# Patient Record
Sex: MALE | Race: WHITE | Employment: OTHER | URBAN - METROPOLITAN AREA
[De-identification: names, ages, dates, MRNs, and addresses within clinical notes are randomized per-mention and may not be internally consistent; named-entity substitution may affect disease eponyms.]

---

## 2018-12-20 ENCOUNTER — HOSPITAL ENCOUNTER (OUTPATIENT)
Dept: PSYCHIATRY | Age: 68
Discharge: HOME | End: 2018-12-20
Payer: MEDICARE

## 2018-12-20 PROCEDURE — 99406 BEHAV CHNG SMOKING 3-10 MIN: CPT

## 2018-12-20 PROCEDURE — 94250 HC DIFFUSION: CPT

## 2019-01-17 ENCOUNTER — HOSPITAL ENCOUNTER (OUTPATIENT)
Dept: PSYCHIATRY | Age: 69
Discharge: HOME | End: 2019-01-17
Payer: MEDICARE

## 2019-01-17 PROCEDURE — 99407 BEHAV CHNG SMOKING > 10 MIN: CPT

## 2019-01-17 PROCEDURE — 94250 HC DIFFUSION: CPT

## 2019-01-31 ENCOUNTER — HOSPITAL ENCOUNTER (OUTPATIENT)
Dept: PSYCHIATRY | Age: 69
Discharge: HOME | End: 2019-01-31
Payer: MEDICARE

## 2019-01-31 PROCEDURE — 99407 BEHAV CHNG SMOKING > 10 MIN: CPT

## 2019-01-31 PROCEDURE — 94250 HC DIFFUSION: CPT

## 2019-02-07 ENCOUNTER — HOSPITAL ENCOUNTER (OUTPATIENT)
Dept: PSYCHIATRY | Age: 69
Discharge: HOME | End: 2019-02-07
Payer: MEDICARE

## 2019-02-07 PROCEDURE — 94250 HC DIFFUSION: CPT

## 2019-02-07 PROCEDURE — 99407 BEHAV CHNG SMOKING > 10 MIN: CPT

## 2019-12-02 ENCOUNTER — HOSPITAL ENCOUNTER (OUTPATIENT)
Dept: PSYCHIATRY | Age: 69
Discharge: HOME | End: 2019-12-02
Payer: MEDICARE

## 2019-12-09 ENCOUNTER — HOSPITAL ENCOUNTER (OUTPATIENT)
Dept: PSYCHIATRY | Age: 69
Discharge: HOME | End: 2019-12-09
Payer: MEDICARE

## 2019-12-09 PROCEDURE — 94250 HC DIFFUSION: CPT | Performed by: COUNSELOR

## 2019-12-16 ENCOUNTER — HOSPITAL ENCOUNTER (OUTPATIENT)
Dept: PSYCHIATRY | Age: 69
Discharge: HOME | End: 2019-12-16
Payer: MEDICARE

## 2019-12-16 PROCEDURE — 94250 HC DIFFUSION: CPT | Performed by: COUNSELOR

## 2019-12-23 ENCOUNTER — HOSPITAL ENCOUNTER (OUTPATIENT)
Dept: PSYCHIATRY | Age: 69
Discharge: HOME | End: 2019-12-23
Payer: MEDICARE

## 2019-12-23 PROCEDURE — 94250 HC DIFFUSION: CPT | Performed by: COUNSELOR

## 2019-12-30 ENCOUNTER — HOSPITAL ENCOUNTER (OUTPATIENT)
Dept: PSYCHIATRY | Age: 69
Discharge: HOME | End: 2019-12-30
Payer: MEDICARE

## 2019-12-30 PROCEDURE — 94250 HC DIFFUSION: CPT | Performed by: COUNSELOR

## 2019-12-30 NOTE — PROGRESS NOTES
[place]   Progress Note    Client Name: Marlon Fallon         Treatment Site:   [x][facility]      [] [address]                      CO level:  1ppm    Length of Service: 60 minutes   Session Type:    [] individual   [x]Group Client/Staff Ratio: 4/1                              Clients target quit date: 19       Last date of tobacco use: 19  Pharmacotherapy provided this session:   []  NRT: Patch  []21mg . / []14mg.  / []7mg. []  NRT:  Gum []2mg. / []4mg.   x (  )boxes  []  NRT: Lozenge []2mg.  / []4mg.  x (  ) tubes       []  Varenicline []0.5 mg.  [] 1 mg. Wks. (  )   []  Bupropion    Wks.  (  )  [x]  Other: none this week________                                                                        Treatment Objectives addressed during the session:       [x] Coping Skils [x] Secondhand Smoke Risks   [] Relapse Prevention [] Promote Self Efficacy   [] Addiction [] Enhance Self-Image   [] Stress Management [] Use Self Affirmation   [] Health and Wellness [] Problem Solving Techniques   []  [] Conflict Resolution/Anger Management      Clients Response to counseling:  [x] Active participant                        [] Passive listener        [] No behavior change, still participating                   [] Inappropriate:   [] Implemented other strategy/behavior changes:   [] Discussed Quit Plan that will be implemented  [] Re-set Quit Date:     Clients Response to Pharmacotherapy:  [] Decreased cravings  [] Decrease in tobacco use:   [x] Maintaining abstinence  [] No change experienced by client  []        Risk/Benefit Meds education provided to client  []        Adverse reaction (if checked - explain):       Plan of Action:  [x] Maintain abstinence  [] Continue treatment; no changes    [] Change pharmacotherapy:   [] Attend Nicotine Anonymous meeting   [x]         Assignments/Homework:   []         Triggers /

## 2020-01-06 ENCOUNTER — HOSPITAL ENCOUNTER (OUTPATIENT)
Dept: PSYCHIATRY | Age: 70
Discharge: HOME | End: 2020-01-06
Payer: MEDICARE

## 2020-01-06 PROCEDURE — 94250 HC DIFFUSION: CPT | Performed by: COUNSELOR

## 2020-01-06 NOTE — PROGRESS NOTES
relapse from crash course. []         Triggers / Concerns to be addressed:   [x] Graduated / received aftercare plan    Comments:     []No call/ no show by client.   Phone call to client:   [] Cancelled:     Counselor Signature: RADHA Morris Session Date:  01/06/2020         Time:2:31pm      CO Level:  1

## 2021-01-18 ENCOUNTER — HOSPITAL ENCOUNTER (OUTPATIENT)
Dept: PSYCHIATRY | Age: 71
Discharge: HOME | End: 2021-01-18
Payer: MEDICARE

## 2021-01-18 NOTE — FLOWSHEET NOTE
Assessment is complete. Client provided verbal consent to participate in the program via telehealth.   Electronically signed by Henry Covington on 1/18/2021 at 11:19 AM

## 2021-02-01 ENCOUNTER — HOSPITAL ENCOUNTER (OUTPATIENT)
Dept: PSYCHIATRY | Age: 71
Discharge: HOME | End: 2021-02-01
Payer: MEDICARE

## 2021-02-01 PROCEDURE — 99411 PREVENTIVE COUNSELING GROUP: CPT | Performed by: COUNSELOR

## 2021-02-01 NOTE — PROGRESS NOTES
[address] [facility]   Progress Note      Client Name: Lloyd Tolliver    Treatment Site:   [x][facility]      [] [address] [place] [address]                      CO Level:  ppm    Length of Service: 30 minutes       Session Type:  [] In Person [x] Virtually - Provider Location [x][facility]      [] [address] [place] [address]                    Patient Location    [x]Patient's Home    [] Other:       Session Provided: [x] Individual   []Group             Client/Staff Ratio:                                 Clients target quit date: 2/10/21       Last date of tobacco use: 2020    Client's actual quit date:       [x]  Client still smoking     Pharmacotherapy provided this session:  []  NRT: Patch  []21mg . / []14mg.  / []7mg. [x]  NRT:  Gum [x]2mg. / []4mg.   x ( 1 )boxes  []  NRT: Lozenge []2mg.  / []4mg.  x (  ) tubes      [x]  Varenicline [x]0.5 mg.  [] 1 mg. Wks. ( 1,2 )  []  Bupropion    Wks.  (  )  []  Other                                                                Treatment Objectives addressed during the session:       [] Coping Skils [] Secondhand Smoke Risks   [x] Relapse Prevention [x] Promote Self Efficacy   [] Addiction [] Enhance Self-Image   [] Stress Management [x] Use Self Affirmation   [] Health and Wellness [] Problem Solving Techniques   []  [] Conflict Resolution/Anger Management      Clients Response to counseling:  [x] Active participant                        [] Passive listener        [] No behavior change, still participating                   [] Inappropriate:   [] Implemented other strategy/behavior changes:   [x] Discussed Quit Plan that will be implemented  [] Re-set Quit Date:     Clients Response to Pharmacotherapy:  [] Decreased cravings  [] Decrease in tobacco use:   [] Maintaining abstinence  [] No change experienced by client  [x]        Risk/Benefit Meds education provided to client  []        Adverse reaction:   []       Other:     Plan of Action:  [] Maintain abstinence  [x] Continue treatment;     [] Change pharmacotherapy:   [] Attend Nicotine Anonymous meeting   [x]        Assignments/Homework: complete relapse section of crash course. []        Triggers / Concerns to be addressed:   [] Graduated / received aftercare plan    Comments: medication authorization faxed to pharmacy on ubaldo. SHILA: Signature, Date, Time: Electronically signed by Princess Pimentel on 2/1/2021 at 5:22 PM          CO Level:  No reading taken, phone session.

## 2021-02-08 ENCOUNTER — HOSPITAL ENCOUNTER (OUTPATIENT)
Dept: PSYCHIATRY | Age: 71
Discharge: HOME | End: 2021-02-08
Payer: MEDICARE

## 2021-02-08 PROCEDURE — 99411 PREVENTIVE COUNSELING GROUP: CPT | Performed by: COUNSELOR

## 2021-02-08 NOTE — PROGRESS NOTES
[facility]      Client Name: Chris Encinas    Treatment Site:   [x][facility]      [] [address] [place] [address]                      CO Level:  ppm    Length of Service: 30 minutes       Session Type:  [] In Person [x] Virtually - Provider Location [x][facility]      [] [address] [place] [address]                    Patient Location    [x]Patient's Home    [] Other:       Session Provided: [x] Individual   []Group             Client/Staff Ratio:                                 Clients target quit date: 21       Last date of tobacco use: 21    Client's actual quit date:       [x]  Client still smoking     Pharmacotherapy provided this session:  []  NRT: Patch  []21mg . / []14mg.  / []7mg. []  NRT:  Gum []2mg. / []4mg.   x (  )boxes  []  NRT: Lozenge []2mg.  / []4mg.  x (  ) tubes      []  Varenicline []0.5 mg.  [] 1 mg. Wks. (  )  []  Bupropion    Wks. (  )  [x]  Other: none this  Week. Treatment Objectives addressed during the session:       [] Coping Skils [] Secondhand Smoke Risks   [] Relapse Prevention [x] Promote Self Efficacy   [x] Addiction [] Enhance Self-Image   [] Stress Management [x] Use Self Affirmation   [] Health and Wellness [] Problem Solving Techniques   []  [] Conflict Resolution/Anger Management      Clients Response to counseling:  [x] Active participant                        [] Passive listener        [] No behavior change, still participating                   [] Inappropriate:   [] Implemented other strategy/behavior changes:   [] Discussed Quit Plan that will be implemented  [] Re-set Quit Date:     Clients Response to Pharmacotherapy:  [] Decreased cravings  [x] Decrease in tobacco use: down to five cigarettes from a pack a day.   [] Maintaining abstinence  [] No change experienced by client  []        Risk/Benefit Meds education provided to client  [x]        Adverse reaction: none  []       Other:     Plan of Action:  [x] Maintain abstinence  [x] Continue treatment;     [] Change pharmacotherapy:   [] Attend Nicotine Anonymous meeting   [x]        Assignments/Homework: complete addiction section of the crash course. []        Triggers / Concerns to be addressed:   [] Graduated / received aftercare plan    Comments: client reports that he is tolerating the medication well. SHILA: Signature, Date, Time: Electronically signed by Alan Musa on 2/8/2021 at 5:26 PM          CO Level:  No reading taken phone session.

## 2021-02-15 ENCOUNTER — HOSPITAL ENCOUNTER (OUTPATIENT)
Dept: PSYCHIATRY | Age: 71
Discharge: HOME | End: 2021-02-15
Payer: MEDICARE

## 2021-02-15 PROCEDURE — 99411 PREVENTIVE COUNSELING GROUP: CPT | Performed by: COUNSELOR

## 2021-02-15 NOTE — PROGRESS NOTES
[address] [facility]   Progress Note      Client Name: Antonia La    Treatment Site:   [x][facility]      [] [address] [place] [address]                      CO Level:  ppm    Length of Service: 30 minutes       Session Type:  [] In Person [x] Virtually - Provider Location [][facility]      [x] [address] [place] [address]                    Patient Location    []Patient's Home    [x] Other: client phone. Session Provided: [x] Individual   []Group             Client/Staff Ratio:                                 Clients target quit date: 2021       Last date of tobacco use: 2021    Client's actual quit date: 2021      []  Client still smoking     Pharmacotherapy provided this session:  []  NRT: Patch  []21mg . / []14mg.  / []7mg. []  NRT:  Gum []2mg. / []4mg.   x (  )boxes  []  NRT: Lozenge []2mg.  / []4mg.  x (  ) tubes      [x]  Varenicline []0.5 mg.  [x] 1 mg. Wks. ( 2,3 )  []  Bupropion    Wks.  (  )  []  Other                                                                Treatment Objectives addressed during the session:       [] Coping Skils [] Secondhand Smoke Risks   [] Relapse Prevention [x] Promote Self Efficacy   [] Addiction [] Enhance Self-Image   [x] Stress Management [x] Use Self Affirmation   [] Health and Wellness [] Problem Solving Techniques   []  [] Conflict Resolution/Anger Management      Clients Response to counseling:  [x] Active participant                        [] Passive listener        [] No behavior change, still participating                   [] Inappropriate:   [] Implemented other strategy/behavior changes:   [] Discussed Quit Plan that will be implemented  [] Re-set Quit Date:     Clients Response to Pharmacotherapy:  [] Decreased cravings  [] Decrease in tobacco use:   [x] Maintaining abstinence  [] No change experienced by client  []        Risk/Benefit Meds education provided to client  []        Adverse reaction:   []       Other:     Plan of

## 2021-02-22 PROCEDURE — 99411 PREVENTIVE COUNSELING GROUP: CPT | Performed by: COUNSELOR

## 2021-02-27 ENCOUNTER — IMMUNIZATION (OUTPATIENT)
Dept: PRIMARY CARE CLINIC | Age: 71
End: 2021-02-27
Payer: MEDICARE

## 2021-02-27 PROCEDURE — 91300 COVID-19, PFIZER VACCINE 30MCG/0.3ML DOSE: CPT | Performed by: EMERGENCY MEDICINE

## 2021-02-27 PROCEDURE — 0001A COVID-19, PFIZER VACCINE 30MCG/0.3ML DOSE: CPT | Performed by: EMERGENCY MEDICINE

## 2021-03-01 ENCOUNTER — HOSPITAL ENCOUNTER (OUTPATIENT)
Dept: PSYCHIATRY | Age: 71
Discharge: HOME | End: 2021-03-01
Payer: MEDICARE

## 2021-03-03 PROCEDURE — 99411 PREVENTIVE COUNSELING GROUP: CPT | Performed by: COUNSELOR

## 2021-03-03 NOTE — PROGRESS NOTES
176 [facility]   Progress Note      Client Name: Caio Tolliver    Treatment Site:   [x][facility]      [] [facility]                      CO Level:  ppm    Length of Service: 30 minutes       Session Type:  [] In Person [x] Virtually - Provider Location [][facility]      [x] [facility]                    Patient Location    [x]Patient's Home    [] Other:       Session Provided: [x] Individual   []Group             Client/Staff Ratio:                                 Clients target quit date: 21       Last date of tobacco use: 21    Client's actual quit date: 21      []  Client still smoking     Pharmacotherapy provided this session:  []  NRT: Patch  []21mg . / []14mg.  / []7mg. []  NRT:  Gum []2mg. / []4mg.   x (  )boxes  []  NRT: Lozenge []2mg.  / []4mg.  x (  ) tubes      []  Varenicline []0.5 mg.  [] 1 mg. Wks. (  )  []  Bupropion    Wks. (  )  [x]  Other:none this week.                                                               Treatment Objectives addressed during the session:       [x] Coping Skils [] Secondhand Smoke Risks   [] Relapse Prevention [] Promote Self Efficacy   [] Addiction [] Enhance Self-Image   [] Stress Management [x] Use Self Affirmation   [] Health and Wellness [] Problem Solving Techniques   []  [] Conflict Resolution/Anger Management      Clients Response to counseling:  [x] Active participant                        [] Passive listener        [] No behavior change, still participating                   [] Inappropriate:   [] Implemented other strategy/behavior changes:   [] Discussed Quit Plan that will be implemented  [] Re-set Quit Date:     Clients Response to Pharmacotherapy:  [] Decreased cravings  [] Decrease in tobacco use:   [x] Maintaining abstinence  [] No change experienced by client  []        Risk/Benefit Meds education provided to client  []        Adverse reaction:   []       Other:     Plan of Action:  [x] Maintain abstinence  [] Continue treatment;     [] Change pharmacotherapy:   [] Attend Nicotine Anonymous meeting   []        Assignments/Homework:   []        Triggers / Concerns to be addressed:   [] Graduated / received aftercare plan    Comments: client called in next day 2/23 for make up session. This note is for session 2/22/2021. SHILA: Signature, Date, Time: Electronically signed by Manny Mitchell on 3/3/2021 at 10:47 AM          CO Level:  No reading taken; phone session.

## 2021-03-03 NOTE — PROGRESS NOTES
[address] [facility]   Progress Note      Client Name: Alex Vicente    Treatment Site:   [x][facility]      [] [address] [place] [address]                      CO Level:  ppm    Length of Service: 30 minutes       Session Type:  [] In Person [x] Virtually - Provider Location [][facility]      [x] [address] [place] [address]                    Patient Location    [x]Patient's Home    [] Other:       Session Provided: [x] Individual   []Group             Client/Staff Ratio:                                 Clients target quit date: 2021       Last date of tobacco use: 21    Client's actual quit date: 21      []  Client still smoking     Pharmacotherapy provided this session:  []  NRT: Patch  []21mg . / []14mg.  / []7mg. []  NRT:  Gum []2mg. / []4mg.   x (  )boxes  []  NRT: Lozenge []2mg.  / []4mg.  x (  ) tubes      [x]  Varenicline []0.5 mg.  [x] 1 mg. Wks. (5,6 )  []  Bupropion    Wks.  (  )  []  Other                                                                Treatment Objectives addressed during the session:       [] Coping Skils [] Secondhand Smoke Risks   [] Relapse Prevention [] Promote Self Efficacy   [] Addiction [] Enhance Self-Image   [] Stress Management [x] Use Self Affirmation   [x] Health and Wellness [] Problem Solving Techniques   []  [] Conflict Resolution/Anger Management      Clients Response to counseling:  [x] Active participant                        [] Passive listener        [] No behavior change, still participating                   [] Inappropriate:   [] Implemented other strategy/behavior changes:   [] Discussed Quit Plan that will be implemented  [] Re-set Quit Date:     Clients Response to Pharmacotherapy:  [] Decreased cravings  [] Decrease in tobacco use:   [x] Maintaining abstinence  [] No change experienced by client  []        Risk/Benefit Meds education provided to client  []        Adverse reaction:   []       Other:     Plan of Action:  [x] Maintain abstinence  [] Continue treatment;     [] Change pharmacotherapy:   [] Attend Nicotine Anonymous meeting   []        Assignments/Homework:   []        Triggers / Concerns to be addressed:   [x] Graduated / received aftercare plan    Comments: client reported phone issues as the reasons he missed phone calls for group. Client has made up all missed groups and has graduated successfully today. SHILA: Signature, Date, Time: Electronically signed by Brooklyn Chambers on 3/3/2021 at 11:01 AM            CO Level:  No reading taken; phone session. Pandemic year.

## 2021-03-23 ENCOUNTER — IMMUNIZATION (OUTPATIENT)
Dept: PRIMARY CARE CLINIC | Age: 71
End: 2021-03-23
Payer: MEDICARE

## 2021-03-23 PROCEDURE — 0002A COVID-19, PFIZER VACCINE 30MCG/0.3ML DOSE: CPT | Performed by: PHYSICIAN ASSISTANT

## 2021-03-23 PROCEDURE — 91300 COVID-19, PFIZER VACCINE 30MCG/0.3ML DOSE: CPT | Performed by: PHYSICIAN ASSISTANT

## 2021-04-25 ENCOUNTER — HOSPITAL ENCOUNTER (INPATIENT)
Age: 71
LOS: 4 days | Discharge: HOME | DRG: 885 | End: 2021-04-29
Attending: EMERGENCY MEDICINE | Admitting: PSYCHIATRY & NEUROLOGY
Payer: MEDICARE

## 2021-04-25 DIAGNOSIS — F39 MOOD DISORDER (HCC): Primary | ICD-10-CM

## 2021-04-25 DIAGNOSIS — F19.10 SUBSTANCE ABUSE (HCC): ICD-10-CM

## 2021-04-25 DIAGNOSIS — F10.920 ACUTE ALCOHOLIC INTOXICATION WITHOUT COMPLICATION (HCC): ICD-10-CM

## 2021-04-25 PROBLEM — R45.851 DEPRESSION WITH SUICIDAL IDEATION: Status: ACTIVE | Noted: 2021-04-25

## 2021-04-25 PROBLEM — F32.A DEPRESSION WITH SUICIDAL IDEATION: Status: ACTIVE | Noted: 2021-04-25

## 2021-04-25 LAB
ACETAMINOPHEN LEVEL: <5 MCG/ML (ref 10–30)
ALBUMIN SERPL-MCNC: 3.7 G/DL (ref 3.5–5.2)
ALP BLD-CCNC: 80 U/L (ref 40–129)
ALT SERPL-CCNC: 12 U/L (ref 0–40)
AMPHETAMINE SCREEN, URINE: NOT DETECTED
ANION GAP SERPL CALCULATED.3IONS-SCNC: 14 MMOL/L (ref 7–16)
ANISOCYTOSIS: ABNORMAL
AST SERPL-CCNC: 48 U/L (ref 0–39)
BARBITURATE SCREEN URINE: NOT DETECTED
BASOPHILS ABSOLUTE: 0 E9/L (ref 0–0.2)
BASOPHILS RELATIVE PERCENT: 1.1 % (ref 0–2)
BENZODIAZEPINE SCREEN, URINE: POSITIVE
BILIRUB SERPL-MCNC: <0.2 MG/DL (ref 0–1.2)
BILIRUBIN URINE: NEGATIVE
BLOOD, URINE: NEGATIVE
BUN BLDV-MCNC: 15 MG/DL (ref 6–23)
CALCIUM SERPL-MCNC: 8.5 MG/DL (ref 8.6–10.2)
CANNABINOID SCREEN URINE: POSITIVE
CHLORIDE BLD-SCNC: 104 MMOL/L (ref 98–107)
CLARITY: CLEAR
CO2: 24 MMOL/L (ref 22–29)
COCAINE METABOLITE SCREEN URINE: POSITIVE
COLOR: YELLOW
CREAT SERPL-MCNC: 1.7 MG/DL (ref 0.7–1.2)
EKG ATRIAL RATE: 75 BPM
EKG P AXIS: 76 DEGREES
EKG P-R INTERVAL: 142 MS
EKG Q-T INTERVAL: 412 MS
EKG QRS DURATION: 96 MS
EKG QTC CALCULATION (BAZETT): 460 MS
EKG R AXIS: 81 DEGREES
EKG T AXIS: 58 DEGREES
EKG VENTRICULAR RATE: 75 BPM
EOSINOPHILS ABSOLUTE: 0.38 E9/L (ref 0.05–0.5)
EOSINOPHILS RELATIVE PERCENT: 6.1 % (ref 0–6)
ETHANOL: 176 MG/DL (ref 0–0.08)
ETHANOL: <10 MG/DL (ref 0–0.08)
FENTANYL SCREEN, URINE: NOT DETECTED
GFR AFRICAN AMERICAN: 48
GFR NON-AFRICAN AMERICAN: 40 ML/MIN/1.73
GLUCOSE BLD-MCNC: 78 MG/DL (ref 74–99)
GLUCOSE URINE: NEGATIVE MG/DL
HCT VFR BLD CALC: 35.8 % (ref 37–54)
HEMOGLOBIN: 12.6 G/DL (ref 12.5–16.5)
KETONES, URINE: NEGATIVE MG/DL
LEUKOCYTE ESTERASE, URINE: NEGATIVE
LYMPHOCYTES ABSOLUTE: 2.05 E9/L (ref 1.5–4)
LYMPHOCYTES RELATIVE PERCENT: 33 % (ref 20–42)
Lab: ABNORMAL
MCH RBC QN AUTO: 40.4 PG (ref 26–35)
MCHC RBC AUTO-ENTMCNC: 35.2 % (ref 32–34.5)
MCV RBC AUTO: 114.7 FL (ref 80–99.9)
METHADONE SCREEN, URINE: NOT DETECTED
MONOCYTES ABSOLUTE: 0.25 E9/L (ref 0.1–0.95)
MONOCYTES RELATIVE PERCENT: 3.5 % (ref 2–12)
MYELOCYTE PERCENT: 0.9 % (ref 0–0)
NEUTROPHILS ABSOLUTE: 3.53 E9/L (ref 1.8–7.3)
NEUTROPHILS RELATIVE PERCENT: 56.5 % (ref 43–80)
NITRITE, URINE: NEGATIVE
OPIATE SCREEN URINE: NOT DETECTED
OXYCODONE URINE: NOT DETECTED
PDW BLD-RTO: 14.6 FL (ref 11.5–15)
PH UA: 5.5 (ref 5–9)
PHENCYCLIDINE SCREEN URINE: NOT DETECTED
PLATELET # BLD: 243 E9/L (ref 130–450)
PMV BLD AUTO: 9.2 FL (ref 7–12)
POIKILOCYTES: ABNORMAL
POLYCHROMASIA: ABNORMAL
POTASSIUM SERPL-SCNC: 4 MMOL/L (ref 3.5–5)
PROTEIN UA: NEGATIVE MG/DL
RBC # BLD: 3.12 E12/L (ref 3.8–5.8)
SALICYLATE, SERUM: <0.3 MG/DL (ref 0–30)
SARS-COV-2, NAAT: NOT DETECTED
SODIUM BLD-SCNC: 142 MMOL/L (ref 132–146)
SPECIFIC GRAVITY UA: 1.02 (ref 1–1.03)
TARGET CELLS: ABNORMAL
TEAR DROP CELLS: ABNORMAL
TOTAL PROTEIN: 5.9 G/DL (ref 6.4–8.3)
TRICYCLIC ANTIDEPRESSANTS SCREEN SERUM: NEGATIVE NG/ML
UROBILINOGEN, URINE: 0.2 E.U./DL
WBC # BLD: 6.2 E9/L (ref 4.5–11.5)

## 2021-04-25 PROCEDURE — 82077 ASSAY SPEC XCP UR&BREATH IA: CPT

## 2021-04-25 PROCEDURE — 87635 SARS-COV-2 COVID-19 AMP PRB: CPT

## 2021-04-25 PROCEDURE — 80307 DRUG TEST PRSMV CHEM ANLYZR: CPT

## 2021-04-25 PROCEDURE — 99285 EMERGENCY DEPT VISIT HI MDM: CPT

## 2021-04-25 PROCEDURE — 1240000000 HC EMOTIONAL WELLNESS R&B

## 2021-04-25 PROCEDURE — 85025 COMPLETE CBC W/AUTO DIFF WBC: CPT

## 2021-04-25 PROCEDURE — 80179 DRUG ASSAY SALICYLATE: CPT

## 2021-04-25 PROCEDURE — 93005 ELECTROCARDIOGRAM TRACING: CPT | Performed by: NURSE PRACTITIONER

## 2021-04-25 PROCEDURE — 81003 URINALYSIS AUTO W/O SCOPE: CPT

## 2021-04-25 PROCEDURE — 6370000000 HC RX 637 (ALT 250 FOR IP): Performed by: PSYCHIATRY & NEUROLOGY

## 2021-04-25 PROCEDURE — 80053 COMPREHEN METABOLIC PANEL: CPT

## 2021-04-25 PROCEDURE — 93010 ELECTROCARDIOGRAM REPORT: CPT | Performed by: INTERNAL MEDICINE

## 2021-04-25 PROCEDURE — 80143 DRUG ASSAY ACETAMINOPHEN: CPT

## 2021-04-25 RX ORDER — AMLODIPINE BESYLATE 5 MG/1
TABLET ORAL
COMMUNITY
Start: 2021-04-07

## 2021-04-25 RX ORDER — TRAZODONE HYDROCHLORIDE 50 MG/1
25 TABLET ORAL NIGHTLY PRN
Status: DISCONTINUED | OUTPATIENT
Start: 2021-04-25 | End: 2021-04-28

## 2021-04-25 RX ORDER — GAUZE BANDAGE 2" X 2"
100 BANDAGE TOPICAL DAILY
Status: DISCONTINUED | OUTPATIENT
Start: 2021-04-26 | End: 2021-04-29 | Stop reason: HOSPADM

## 2021-04-25 RX ORDER — HALOPERIDOL 2 MG/1
3 TABLET ORAL EVERY 6 HOURS PRN
Status: DISCONTINUED | OUTPATIENT
Start: 2021-04-25 | End: 2021-04-29 | Stop reason: HOSPADM

## 2021-04-25 RX ORDER — DIVALPROEX SODIUM 250 MG/1
250 TABLET, DELAYED RELEASE ORAL 2 TIMES DAILY
Status: DISCONTINUED | OUTPATIENT
Start: 2021-04-25 | End: 2021-04-29 | Stop reason: HOSPADM

## 2021-04-25 RX ORDER — HALOPERIDOL 5 MG/ML
3 INJECTION INTRAMUSCULAR EVERY 6 HOURS PRN
Status: DISCONTINUED | OUTPATIENT
Start: 2021-04-25 | End: 2021-04-29 | Stop reason: HOSPADM

## 2021-04-25 RX ORDER — DIPHENOXYLATE HYDROCHLORIDE AND ATROPINE SULFATE 2.5; .025 MG/1; MG/1
TABLET ORAL
Status: ON HOLD | COMMUNITY
Start: 2021-04-10 | End: 2021-04-29 | Stop reason: HOSPADM

## 2021-04-25 RX ORDER — CHLORDIAZEPOXIDE HYDROCHLORIDE 25 MG/1
25 CAPSULE, GELATIN COATED ORAL EVERY 6 HOURS PRN
Status: DISCONTINUED | OUTPATIENT
Start: 2021-04-25 | End: 2021-04-29 | Stop reason: HOSPADM

## 2021-04-25 RX ORDER — LISINOPRIL 20 MG/1
TABLET ORAL
COMMUNITY
Start: 2021-04-07

## 2021-04-25 RX ORDER — MAGNESIUM HYDROXIDE/ALUMINUM HYDROXICE/SIMETHICONE 120; 1200; 1200 MG/30ML; MG/30ML; MG/30ML
30 SUSPENSION ORAL PRN
Status: DISCONTINUED | OUTPATIENT
Start: 2021-04-25 | End: 2021-04-29 | Stop reason: HOSPADM

## 2021-04-25 RX ORDER — MULTIVITAMIN WITH IRON
1 TABLET ORAL DAILY
Status: DISCONTINUED | OUTPATIENT
Start: 2021-04-26 | End: 2021-04-29 | Stop reason: HOSPADM

## 2021-04-25 RX ORDER — FOLIC ACID 1 MG/1
1 TABLET ORAL ONCE
Status: COMPLETED | OUTPATIENT
Start: 2021-04-26 | End: 2021-04-26

## 2021-04-25 RX ORDER — DIAZEPAM 5 MG/1
TABLET ORAL
Status: ON HOLD | COMMUNITY
Start: 2021-04-16 | End: 2021-04-29 | Stop reason: HOSPADM

## 2021-04-25 RX ORDER — ACETAMINOPHEN 325 MG/1
650 TABLET ORAL EVERY 4 HOURS PRN
Status: DISCONTINUED | OUTPATIENT
Start: 2021-04-25 | End: 2021-04-29 | Stop reason: HOSPADM

## 2021-04-25 RX ADMIN — DIVALPROEX SODIUM 250 MG: 250 TABLET, DELAYED RELEASE ORAL at 17:56

## 2021-04-25 RX ADMIN — TRAZODONE HYDROCHLORIDE 25 MG: 50 TABLET ORAL at 21:01

## 2021-04-25 ASSESSMENT — SLEEP AND FATIGUE QUESTIONNAIRES
SLEEP PATTERN: DIFFICULTY FALLING ASLEEP
DIFFICULTY STAYING ASLEEP: NO
DO YOU HAVE DIFFICULTY SLEEPING: YES
DO YOU USE A SLEEP AID: YES
DIFFICULTY ARISING: NO

## 2021-04-25 ASSESSMENT — PAIN SCALES - GENERAL: PAINLEVEL_OUTOF10: 0

## 2021-04-25 ASSESSMENT — PAIN DESCRIPTION - LOCATION: LOCATION: BACK

## 2021-04-25 ASSESSMENT — LIFESTYLE VARIABLES: HISTORY_ALCOHOL_USE: NO

## 2021-04-25 NOTE — ED NOTES
The pt was accepted to [facility] bed [address]302a. Disposition called to Jewel Perez in admitting.      Radha Carlson, [facility]  04/25/21 Lyla [address], [facility]  04/25/21 9898

## 2021-04-25 NOTE — ED PROVIDER NOTES
HPI:  4/25/21, Time: 1:51 AM EDT         Sheryle Reilly is a 79 y.o. male presenting to the ED for suicidal thoughts, beginning 1 day ago. The complaint has been persistent, moderate in severity, and worsened by emotional upset. Patient reportedly lost wife several months ago. Patient had been drinking. Patient had knife to his throat. Police were involved and pink the patient. Patient reporting that he was upset about recent death of wife. Patient reporting not wanting to harm himself or others. Patient reporting no hallucinations he reports no abdominal pain or chest pain he reports no difficulty breathing there is no fever chills or cough. Patient reporting no weakness or dizziness. ROS:   Pertinent positives and negatives are stated within HPI, all other systems reviewed and are negative.  --------------------------------------------- PAST HISTORY ---------------------------------------------  Past Medical History:  has a past medical history of Hyperlipidemia, Hypertension, Nausea & vomiting, and Urinary frequency. Past Surgical History:  has a past surgical history that includes Cardiac surgery; ventral hernia repair (11/1/2013); Umbilical hernia repair (11/1/2013); and Inguinal hernia repair (Bilateral, 11//1/2013). Social History:  reports that he has quit smoking. He smoked 0.25 packs per day. He has never used smokeless tobacco. He reports current alcohol use. He reports that he does not use drugs. Family History: family history is not on file. The patients home medications have been reviewed.     Allergies: Sulfa antibiotics    ---------------------------------------------------PHYSICAL EXAM--------------------------------------    Constitutional/General: Alert and oriented x3,   Head: Normocephalic and atraumatic  Eyes: PERRL, EOMI  Mouth: Oropharynx clear, handling secretions, no trismus  Neck: Supple, full ROM, non tender to palpation in the midline, no stridor, no crepitus, no meningeal signs  Pulmonary: Lungs clear to auscultation bilaterally, no wheezes, rales, or rhonchi. Not in respiratory distress  Cardiovascular:  Regular rate. Regular rhythm. No murmurs, gallops, or rubs. 2+ distal pulses  Chest: no chest wall tenderness  Abdomen: Soft. Non tender. Non distended. +BS. No rebound, guarding, or rigidity. No pulsatile masses appreciated. Musculoskeletal: Moves all extremities x 4. Warm and well perfused, no clubbing, cyanosis, or edema. Capillary refill <3 seconds  Skin: warm and dry. No rashes. Neurologic: GCS 15, CN 2-12 grossly intact, no focal deficits, symmetric strength 5/5 in the upper and lower extremities bilaterally  Psych: Affect flat patient not actively suicidal no homicidal thoughts no hallucinations    -------------------------------------------------- RESULTS -------------------------------------------------  I have personally reviewed all laboratory and imaging results for this patient. Results are listed below.      LABS:  Results for orders placed or performed during the hospital encounter of 04/25/21   Urine Drug Screen   Result Value Ref Range    Amphetamine Screen, Urine NOT DETECTED Negative <1000 ng/mL    Barbiturate Screen, Ur NOT DETECTED Negative < 200 ng/mL    Benzodiazepine Screen, Urine POSITIVE (A) Negative < 200 ng/mL    Cannabinoid Scrn, Ur POSITIVE (A) Negative < 50ng/mL    Cocaine Metabolite Screen, Urine POSITIVE (A) Negative < 300 ng/mL    Opiate Scrn, Ur NOT DETECTED Negative < 300ng/mL    PCP Screen, Urine NOT DETECTED Negative < 25 ng/mL    Methadone Screen, Urine NOT DETECTED Negative <300 ng/mL    Oxycodone Urine NOT DETECTED Negative <100 ng/mL    FENTANYL SCREEN, URINE NOT DETECTED Negative <1 ng/mL    Drug Screen Comment: see below    Serum Drug Screen   Result Value Ref Range    Ethanol Lvl 176 mg/dL    Acetaminophen Level <5.0 (L) 10.0 - 09.7 mcg/mL    Salicylate, Serum <8.5 0.0 - 30.0 mg/dL    TCA Scrn NEGATIVE Cutoff:300 ng/mL CBC Auto Differential   Result Value Ref Range    WBC 6.2 4.5 - 11.5 E9/L    RBC 3.12 (L) 3.80 - 5.80 E12/L    Hemoglobin 12.6 12.5 - 16.5 g/dL    Hematocrit 35.8 (L) 37.0 - 54.0 %    .7 (H) 80.0 - 99.9 fL    MCH 40.4 (H) 26.0 - 35.0 pg    MCHC 35.2 (H) 32.0 - 34.5 %    RDW 14.6 11.5 - 15.0 fL    Platelets 006 810 - 142 E9/L    MPV 9.2 7.0 - 12.0 fL   Comprehensive Metabolic Panel   Result Value Ref Range    Sodium 142 132 - 146 mmol/L    Potassium 4.0 3.5 - 5.0 mmol/L    Chloride 104 98 - 107 mmol/L    CO2 24 22 - 29 mmol/L    Anion Gap 14 7 - 16 mmol/L    Glucose 78 74 - 99 mg/dL    BUN 15 6 - 23 mg/dL    CREATININE 1.7 (H) 0.7 - 1.2 mg/dL    GFR Non-African American 40 >=60 mL/min/1.73    GFR African American 48     Calcium 8.5 (L) 8.6 - 10.2 mg/dL    Total Protein 5.9 (L) 6.4 - 8.3 g/dL    Albumin 3.7 3.5 - 5.2 g/dL    Total Bilirubin <0.2 0.0 - 1.2 mg/dL    Alkaline Phosphatase 80 40 - 129 U/L    ALT 12 0 - 40 U/L    AST 48 (H) 0 - 39 U/L   Urinalysis   Result Value Ref Range    Color, UA Yellow Straw/Yellow    Clarity, UA Clear Clear    Glucose, Ur Negative Negative mg/dL    Bilirubin Urine Negative Negative    Ketones, Urine Negative Negative mg/dL    Specific Gravity, UA 1.020 1.005 - 1.030    Blood, Urine Negative Negative    pH, UA 5.5 5.0 - 9.0    Protein, UA Negative Negative mg/dL    Urobilinogen, Urine 0.2 <2.0 E.U./dL    Nitrite, Urine Negative Negative    Leukocyte Esterase, Urine Negative Negative       RADIOLOGY:  Interpreted by Radiologist.  No orders to display       EKG: This EKG is signed and interpreted by me. Rate: 75  Rhythm: Sinus  Interpretation: no acute changes  Comparison: stable as compared to patient's most recent EKG  2017 ekg      ------------------------- NURSING NOTES AND VITALS REVIEWED ---------------------------   The nursing notes within the ED encounter and vital signs as below have been reviewed by myself.   /74   Pulse 81   Temp 96.9 °F (36.1 °C)   Resp MD  04/25/21 9811

## 2021-04-25 NOTE — ED NOTES
Emergency Department [facility] AN AFFILIATE OF [facility] Biopsychosocial Assessment Note    Chief Complaint:     Patient presents to the ED for suicidal thoughts. EMS was contacted as pt had been drinking, became upset and held a knife to his throat, due to upset over his wife passing away recently. MSE:    Pt presents as a 79year old male. He is alert and oriented x4. Pt is irritable, minimally cooperative. Speech is of normal rate, somewhat low volume. Eye contact is poor. Pt affect is flat. Judgment and insight appear to be poor. At time of assessment, pt denies SI, HI and AVH. Admits to alcohol use. Clinical Summary/History:     Initially, pt relays he would \"rather not think about\" the incident leading to his being brought to the ED. He states police, EMTs, firemen, both of his daughters were at his home. He explains he does not know what came over him in the moment, but he is not experiencing SI at this time. Pt admits that he was upset earlier, and has been upset for a few months since his wife's passing. \"I'm not here for your feelings, or for pity. If I wanted to do it, I'd just do it. \"     Pt denies any mental health history. He states he sees Dr. Garett Emanuel, his PCP, monthly as he is prescribed Norcos for back pain. Pt reports this is the only medication he takes. Expresses frustration with having been brought to the ED. Relays he will be going home, to sleep in his own bed. Pt eyes are bloodshot at time of assessment. He admits to drinking, but appears to be minimizing the amount he drinks. Gender  [x] Male [] Female [] Transgender  [] Other    Sexual Orientation    [x] Heterosexual [] Homosexual [] Bisexual [] Other    Suicidal Behavioral: CSSR-S Complete. [] Reports:    [] Past [] Present   [x] Denies    Homicidal/ Violent Behavior  [] Reports:   [] Past [] Present   [x] Denies     Hallucinations/Delusions   [] Reports:   [x] Denies     Substance Use/Alcohol Use/Addiction: SBIRT Screen Complete.    [] Reports:   [x] Denies Trauma History  [x] Reports:  [] Denies     Collateral Information:   No collateral obtained at this time. Level of Care/Disposition Plan  [] Home:   [] Outpatient Provider:   [] Crisis Unit:   [x] Inpatient Psychiatric Unit:  [] Other:   A pink slip was issued by [facility] BUCK[facility]. Pt will be reviewed for psychiatric admission.      MARILU Watson, [facility]  04/25/21 8738

## 2021-04-25 NOTE — ED NOTES
Pt reports that he is on bp meds and his pharmacy is [facility] which is not open today.   Pt reports he drinks 2 times per week states he drinks darren Ail and bourban approx  2 glasses      Estrellita Earl RN  04/25/21 4664

## 2021-04-25 NOTE — ED NOTES
A bag containing the patient's wallet, phone,  with cord, and keys. Bag was labeled and placed in locked closet.       Page Flores  04/25/21 0600

## 2021-04-25 NOTE — ED NOTES
A Covid- result will be necessary for review for admission. ED Physician aware.      MARILU Wong, [facility]  04/25/21 0873

## 2021-04-25 NOTE — ED NOTES
Pt calmly stated that he will not change out of clothing and into hospital gown at this time. Discussed reasoning behind protocols and pt verbalizes understanding but does not want to change. Became very emotional with this RN when discussing what happened earlier tonight and that he misses his wife and feels he \"doesn't deserve to be here without her. \" emotional support provided to pt.      Shae Balderrama RN  04/25/21 9885

## 2021-04-25 NOTE — ED NOTES
Pt reports that he doesn't know why h is here further speaking with pt he states My kids called th ambulance then he reports I guess I had a knife and was on the ground but I always have a knife\"  informe pt of plan of care at this time     Luis M Qiu RN  04/25/21 1343

## 2021-04-25 NOTE — PROGRESS NOTES
Time, [place] to Place, [place] to Situation  Attention:Normal: Yes  Thought Processes: Other(See comment), Circumstantial(minimizing)  Thought Content:Normal: No  Thought Content: Preoccupations, Other(See Comment)(self preservation)  Hallucinations: None  Delusions: No  Memory:Normal: No  Memory: Poor Recent  Insight and Judgment: No  Insight and Judgment: Poor Judgment, Poor Insight  Present Suicidal Ideation: No  Present Homicidal Ideation: No    Tobacco Screening:  Practical Counseling, on admission, aditi X, if applicable and completed (first 3 are required if patient doesn't refuse):            ( )  Recognizing danger situations (included triggers and roadblocks)                    ( )  Coping skills (new ways to manage stress, exercise, relaxation techniques, changing routine, distraction)                                                           ( )  Basic information about quitting (benefits of quitting, techniques in how to quit, available resources  ( ) Referral for counseling faxed to [facility]                                           ( ) Patient refused counseling  (x ) Patient has not smoked in the last 30 days    Metabolic Screening:    No results found for: LABA1C    No results found for: CHOL  No results found for: TRIG  No results found for: HDL  No components found for: LDLCAL  No results found for: LABVLDL      Body mass index is 21.9 kg/m². BP Readings from Last 2 Encounters:   04/25/21 (!) 197/91   10/10/17 132/70           Pt admitted with followings belongings:  Dentures: Lowers, Uppers  Vision - Corrective Lenses: None  Hearing Aid: None  Jewelry: Necklace  Body Piercings Removed: N/A      Valuables placed in safe in security envelope, number:  [id number]. Patient oriented to surroundings and program expectations and copy of patient rights given. Received admission packet:  yes. Consents reviewed, signed yes. Patient verbalize understanding:  yes.     Patient education on

## 2021-04-26 PROBLEM — F31.81 MIXED BIPOLAR II DISORDER (HCC): Status: ACTIVE | Noted: 2021-04-26

## 2021-04-26 PROCEDURE — 6370000000 HC RX 637 (ALT 250 FOR IP): Performed by: NURSE PRACTITIONER

## 2021-04-26 PROCEDURE — 99222 1ST HOSP IP/OBS MODERATE 55: CPT | Performed by: NURSE PRACTITIONER

## 2021-04-26 PROCEDURE — 1240000000 HC EMOTIONAL WELLNESS R&B

## 2021-04-26 PROCEDURE — 6370000000 HC RX 637 (ALT 250 FOR IP): Performed by: PSYCHIATRY & NEUROLOGY

## 2021-04-26 RX ORDER — TAMSULOSIN HYDROCHLORIDE 0.4 MG/1
0.4 CAPSULE ORAL DAILY
Status: DISCONTINUED | OUTPATIENT
Start: 2021-04-26 | End: 2021-04-29 | Stop reason: HOSPADM

## 2021-04-26 RX ORDER — NITROGLYCERIN 0.4 MG/1
0.4 TABLET SUBLINGUAL EVERY 5 MIN PRN
Status: DISCONTINUED | OUTPATIENT
Start: 2021-04-26 | End: 2021-04-29 | Stop reason: HOSPADM

## 2021-04-26 RX ORDER — LOPERAMIDE HYDROCHLORIDE 2 MG/1
2 CAPSULE ORAL 4 TIMES DAILY PRN
Status: DISCONTINUED | OUTPATIENT
Start: 2021-04-26 | End: 2021-04-29 | Stop reason: HOSPADM

## 2021-04-26 RX ORDER — IBUPROFEN 600 MG/1
600 TABLET ORAL EVERY 8 HOURS PRN
Status: DISCONTINUED | OUTPATIENT
Start: 2021-04-26 | End: 2021-04-29 | Stop reason: HOSPADM

## 2021-04-26 RX ORDER — AMLODIPINE BESYLATE 5 MG/1
5 TABLET ORAL DAILY
Status: DISCONTINUED | OUTPATIENT
Start: 2021-04-26 | End: 2021-04-29 | Stop reason: HOSPADM

## 2021-04-26 RX ORDER — LISINOPRIL 10 MG/1
10 TABLET ORAL DAILY
Status: DISCONTINUED | OUTPATIENT
Start: 2021-04-26 | End: 2021-04-29 | Stop reason: HOSPADM

## 2021-04-26 RX ADMIN — TRAZODONE HYDROCHLORIDE 25 MG: 50 TABLET ORAL at 21:20

## 2021-04-26 RX ADMIN — Medication 1 TABLET: at 10:29

## 2021-04-26 RX ADMIN — FOLIC ACID 1 MG: 1 TABLET ORAL at 10:29

## 2021-04-26 RX ADMIN — LOPERAMIDE HYDROCHLORIDE 2 MG: 2 CAPSULE ORAL at 16:12

## 2021-04-26 RX ADMIN — AMLODIPINE BESYLATE 5 MG: 5 TABLET ORAL at 14:52

## 2021-04-26 RX ADMIN — Medication 100 MG: at 10:29

## 2021-04-26 RX ADMIN — DIVALPROEX SODIUM 250 MG: 250 TABLET, DELAYED RELEASE ORAL at 10:29

## 2021-04-26 RX ADMIN — LISINOPRIL 10 MG: 10 TABLET ORAL at 15:31

## 2021-04-26 RX ADMIN — TAMSULOSIN HYDROCHLORIDE 0.4 MG: 0.4 CAPSULE ORAL at 14:52

## 2021-04-26 RX ADMIN — LOPERAMIDE HYDROCHLORIDE 2 MG: 2 CAPSULE ORAL at 21:22

## 2021-04-26 RX ADMIN — DIVALPROEX SODIUM 250 MG: 250 TABLET, DELAYED RELEASE ORAL at 21:20

## 2021-04-26 ASSESSMENT — SLEEP AND FATIGUE QUESTIONNAIRES
DO YOU HAVE DIFFICULTY SLEEPING: YES
AVERAGE NUMBER OF SLEEP HOURS: 6
DO YOU USE A SLEEP AID: YES

## 2021-04-26 ASSESSMENT — PAIN SCALES - GENERAL: PAINLEVEL_OUTOF10: 4

## 2021-04-26 NOTE — PROGRESS NOTES
Multiple attempts to call pt's pharmacy and  s office have been made. I have left multiple VMs to call me back to verify pts meds.  No return phone calls have been made to me unable to verify meds at this time

## 2021-04-26 NOTE — CARE COORDINATION
Biopsychosocial Assessment Note    Social work met with patient to complete the biopsychosocial assessment and CSSR-S. Mental Status Exam: pt was friendly, pleasant and cooperative and brightened upon approach. Pt denied any si, hi, halluc or paranoia. His thoughts were organized, eye contact was good. He was alert and oriented x 4. Chief Complaint: I had too much to drink and fell    Patient Report: Pt reports he had one too many drinks when grandkids were over. He then tripped and fell. He states he only drinks 1-2 drinks usually at a time, a couple times a week. He also uses a couple hits of marijuana during the day. He reports feeling depressed and anxious since his wife  in Aug. He states he has no contact with his children but is close with his wife's chidlren. His stepdtr, granddtr, and his dog live a block away and pt usually goes there daily. Gender  [x] Male [] Female [] Transgender  [] Other    Sexual Orientation    [x] Heterosexual [] Homosexual [] Bisexual [] Other    Suicidal Ideation  [] Past [] Present [x] Denies     Homicidal Ideation  [] Past [] Present [x] Denies     Hallucinations/Delusions (Specify type)  [] Reports [x] Denies     Substance Use/Alcohol Use/Addiction  [x] Reports [] Denies     Tobacco Use (within the last 6 months)  [x] Reports [] Denies     Trauma History  [] Reports [x] Denies     Collateral Contact (ROSALVA signed)  Name: Ehsan Wu  Relationship:Didierdtr  Number: [phone]  Collateral Information:        Access to Weapons per Collateral Contact: [] Reports [] Denies       Follow up provider preference:  Pt would like referred for counseling and requests referral to [facility]. Plan for discharge  Location (where do they plan on discharging to?): return home where he lives alone  Transportation (who will pick them up at discharge?) stepdtr could transport him  Medications (will they have money for copays at discharge?): has ins coverage. He states he can afford copays.

## 2021-04-26 NOTE — H&P
Department of Psychiatry  History and Physical - Adult   I have personally seen and assessed and evaluated the patient this morning and agree with the below assessment, recommendations and evaluation by the medical student. Mental status examination reveals a 66-year-old  male in hospital attire with average hygiene and average grooming is superficially forthcoming and cooperative for assessment. Psychomotor reveals no agitation retardation or abnormal posturing there are no movements. Speech is clear, well articulated there is no delayed latency of response he is able to answer questions with relevance. Mood is \"good. \"  Affect is sad depressed anxious, notably tearful incongruent with stated mood. Process is linear and goal directed. Thought content is devoid of suicidal or homicidal ideation intent or plan, devoid of auditory or visual hallucinations there are no overt or covert signs psychosis or paranoia. However the patient did report to [facility] emergency department via EMS on a pink slip with concerns of suicidal ideation as he was found intoxicated with a knife. Cognitive function appears to be below baseline due to illness. Memory intact for conversation. Insight judgment and impulse control are poor. Is alert and oriented to person place time and situation able to recount some of the events leading to his hospitalization. CHIEF COMPLAINT:  \"I had too much to drink and fell\"    Patient was seen after discussing with the treatment team and reviewing the chart    CIRCUMSTANCES OF ADMISSION: Patient had mechanical fall early morning of April 25th under influence alcohol and benzodiazepines while watching step-ganmonetren. Step-daughters arrived and called EMS over concerns of suicidal ideation. Pink slipped and transferred to Psychiatry after being cleared by ER.       HISTORY OF PRESENT ILLNESS:    Karolina Gold is a 79 y.o. male who is a , lives independently in an apartment, currently a retired  with a psychiatric history of Panic Disorder manged on diazepam followed by Dr. Leora Horta of [facility] with a pertinent past medical history of Pacemaker 2006, CABG 2008 and Pacemaker Sepsis 2016 who presented to the emergency department the early mornig of April 25th via EMS over concerns of a fall and suicidal ideation. On admission toxicology showed presence of cocaine, benzodiazapine's (has Rx for diazepam and alprazolam), cannabinods and an ethanol level of 176. EKG showed a QTc interval of  460. He was medically cleared, pink slipped and transferred to psychiatric services for further management. Story provided by the patient is that he does not have suicidal ideation. He was in his usual state of health Saturday April 24th. He notes that he drinks approximately 2 alcoholic beverages each evening. The evening of April 95VQ had 3 alcoholic beverages and then his stepdaughter Che dropped off his two step grandchildren (Raffy Tejadands 12yr). Both children went to bed around ~10:30pm. The patient states he stayed up to watch a television program and had another alcoholic beverage. In addition he took one of his Valiums. He states just after midnight he got up to go to bed and under the influence of alcohol and his Rx benzodiazepines had a mechanical fall. He recollects falling and hitting his side (no head trauma) on a table  in the living room and not being able to get up. He states after his recollection is fuzzy but as far as he understands his step granddaughter Beck Mathis called her mother Che and she and his other Mal Pean arrived and called police because he needed medical attention.  When asked about the knife reported by family he states that his stepdaughters asked him if he was suicidal when they arrived and he told them if he wanted to kill himself he could use the knife in the kitchen and proceeded to take it out of the coyne and possession of cocaine that he states was his friends. PAST PSYCHIATRIC HISTORY  Psychiatric history of Panic Disorder manged on diazepam and Alprazolam followed by Dr. Luz Elena Vallecillo of [facility]. Although not diagnosed, states he has some anxiety since the passing of his wife in 2020 that he self medicates with Marijuana use. He has never been hospitalized or consulted in a medical health professional. No psychiatric history in his family. SUBSTANCE ABUSE HISTORY   Patient states he has averaged 2-3 drinks per day since he has been of legal age to consume alcohol. He has never been in a treatment program for this. Patient has been using Marijuana, approximately 1 \"joint\" per day to help with anxiety since his wife's passing in 2020. Patient denies use of other illicit substances or taking Rx's not prescribed to him. LEGAL HISTORY   Patient had a charge for possession of cocaine ~30 years ago that required he pay a fine. States he has not since had legal trouble. PERSONAL/FAMILY/SOCIAL HISTORY   Patient born and raised in [place] [address]. Both parents  and has 3 living brother.  from first wife in [address] [place] [address] whom he had 2 children he has not been in contact with for ~15 years. He remarried in  and she passed in 2020 but he is still close contact with his stepdaughters and step grandchildren. Patient completed high school and quit his sophomore year of college and worked as a  until he retired at the age of 72. Denies physical, emotional or sexual abuse. Denies head trauma or motor vehicle accidents. Patient does have access to guns including a shotgun and a .22 caliber rifle.      Past Medical History:        Diagnosis Date    Hyperlipidemia     Hypertension     Nausea & vomiting     Urinary frequency        Medications Prior to Admission:   Medications Prior to Admission: tamsulosin (FLOMAX) 0.4 MG capsule, Take 0.4 mg by mouth daily.  diphenoxylate-atropine (LOMOTIL) 2.5-0.025 MG per tablet,   diazePAM (VALIUM) 5 MG tablet,   amLODIPine (NORVASC) 5 MG tablet,   lisinopril (PRINIVIL;ZESTRIL) 20 MG tablet,   ALPRAZolam (XANAX) 0.5 MG tablet, Take 0.5 mg by mouth  Umeclidinium-Vilanterol (ANORO ELLIPTA IN), Inhale into the lungs  Ipratropium-Albuterol (COMBIVENT IN), Inhale into the lungs  nitroGLYCERIN (NITROSTAT) 0.4 MG SL tablet, Place 1 tablet under the tongue every 5 minutes as needed for Chest pain. Past Surgical History:        Procedure Laterality Date    CARDIAC SURGERY      INGUINAL HERNIA REPAIR Bilateral 11//1/2013    [facility] - Laparoscopic Robotic Assisted, with Mesh    UMBILICAL HERNIA REPAIR  11/1/2013    [facility] - Laparoscopic with Mesh    VENTRAL HERNIA REPAIR  11/1/2013    [facility] - Laparoscopic with Mesh       Allergies:   Sulfa antibiotics    Family History  History reviewed. No pertinent family history. EXAMINATION:    REVIEW OF SYSTEMS:    ROS:  [x] All negative/unchanged except if checked.  Explain positive(checked items) below:  [] Constitutional  [] Eyes  [] Ear/Nose/Mouth/Throat  [] Respiratory  [] CV  [] GI  []   [] Musculoskeletal  [x] Skin/Breast  - bruising on arms from fall    [] Neurological  [] Endocrine  [x] Heme/Lymph - bruising on arms from fall  [] Allergic/Immunologic    Explanation:     Vitals:  BP (!) 208/97   Pulse 77   Temp 98 °F (36.7 °C) (Temporal)   Resp 16   Ht 5' 8\" (1.727 m)   Wt 144 lb (65.3 kg)   SpO2 93%   BMI 21.90 kg/m²      Physical Examination:   Head: x  Atraumatic: x normocephalic  Skin and Mucosa        Moist x  Dry   Pale  x Normal   Neck:  Thyroid  Palpable   x  Not palpable   venus distention   adenopathy   Chest: x Clear   Rhonchi     Wheezing   CV:  xS1   xS2    xNo murmer   Abdomen:  x  Soft    Tender    Viceromegaly   Extremities:  x No Edema     Edema     Cranial Nerves Examination:   CN II:   xPupils are reactive to light  Pupils are non reactive to light  CN III, IV, VI:  xNo eye deviation    No diplopia or ptosis   CN V:    xFacial Sensation is intact     Facial Sensation is not intact   CN IIIV:   x Hearing is normal to rubbing fingers   CN IX, X:     xNormal gag reflex and phonation   CN XI:   xShoulder shrug and neck rotation is normal  CNXII:    xTongue is midline no deviation or atrophy    Mental Status Examination:    Level of consciousness:  within normal limits   Appearance:  well-appearing, hospital attire, in chair, good grooming and good hygiene  Behavior/Motor:  no abnormalities noted  Attitude toward examiner: superficially coooperative  Speech:  spontaneous, normal rate, normal volume and well articulated   Mood: \"good\" superficially smiling with good energy throughout interview  Affect:  Incongruent, holding back tears throughout interview  Thought processes:  linear, goal directed and coherent   Thought content:  Denies suicidal or homicidal ideation.  No auditory or visual hallucinations  Cognition:  oriented to person, place, and time   Concentration intact  Memory intact  Insight poor   Judgement poor   Fund of Knowledge adequate      DIAGNOSIS:   Mixed bipolar II disorder ([facility] [address]  Polysubstance Abuse        LABS: REVIEWED TODAY:  Recent Labs     04/25/21 0231   WBC 6.2   HGB 12.6        Recent Labs     04/25/21 0231      K 4.0      CO2 24   BUN 15   CREATININE 1.7*   GLUCOSE 78     Recent Labs     04/25/21 0231   BILITOT <0.2   ALKPHOS 80   AST 48*   ALT 12     Lab Results   Component Value Date    LABAMPH NOT DETECTED 04/25/2021    BARBSCNU NOT DETECTED 04/25/2021    LABBENZ POSITIVE 04/25/2021    LABMETH NOT DETECTED 04/25/2021    OPIATESCREENURINE NOT DETECTED 04/25/2021    PHENCYCLIDINESCREENURINE NOT DETECTED 04/25/2021    ETOH <10 04/25/2021     No results found for: TSH, FREET4  No results found for: LITHIUM  No results found for: VALPROATE, CBMZ  No results found for: LITHIUM, VALPROATE      Radiology No results found. TREATMENT PLAN:  The patient's diagnosis, treatment plan, medication management were formulated after patient was seen directly by the attending physician and myself and all relevant documentation was reviewed. Risk Management: Based on the diagnosis and assessment biopsychosocial treatment model was presented to the patient and was given the opportunity to ask any question. The patient was agreeable to the plan and all the patient's questions were answered to the patient's satisfaction. I discussed with the patient the risk, benefit, alternative and common side effects for the proposed medication treatment. The patient is consenting to this treatment. Collateral Information:  Will obtain collateral information from the family or friends. Will obtain medical records as appropriate from out patient providers  Will consult the hospitalist for a physical exam to rule out any co-morbid physical condition. The patient was referred to outpatient/inpatient substance abuse rehabilitation programming. He was educated multiple times during the hospitalization that if he chooses to continue to use drugs or alcohol, he may potentially act out impulsively, resulting in serious harm to self or others, even though unintentional.  He was also educated that mental health treatment cannot be optimized with ongoing use of drugs. He demonstrated understanding has the capacity to understand that. Home medication Reconciled       New Medications started during this admission :    Depakote 250 mg twice daily for mood stability  We will consider citalopram for impulsivity if needed  Continue Librium and CIWA as indicated    Prn Haldol 5mg and Vistaril 50mg q6hr for extreme agitation.   Trazodone as ordered for insomnia  Vistaril as ordered for anxiety      Psychotherapy:   Encourage participation in milieu and group therapy  Individual therapy as needed              [employer] Certification Upon Admission    I certify that this patient's inpatient psychiatric hospital admission is medically necessary for:    [x] (1) Treatment which could reasonably be expected to improve this patient's condition,       [x] (2) Or for diagnostic study;     AND     [x](2) The inpatient psychiatric services are provided while the individual is under the care of a physician and are included in the individualized plan of care.     Estimated length of stay/service 3 to 5 days based on stability    Plan for post-hospital care follow-up with outpatient provider    Electronically signed by ALLAN Lopez CNP on 4/26/2021 at 1:58 PM

## 2021-04-26 NOTE — PROGRESS NOTES
Patient has been out on the unit, pleasant, calm, and cooperative. Patient keeps to himself for the most part and is not observed socializing with others. Patient denies all and denies anxiety/depression and states that he feels \"embarrassed\" and \"ashamed. \"  Patient denies withdrawal symptoms and was educated on the importance of telling this nurse if he begins to feel withdrawal symptoms so that he can be medicated with PRN Librium. Patient is encouraged to continue to work towards discharge goal by complying with medications, attending groups and to seek staff if feelings are overwhelming. Environmental rounds completed per unit policy to maintain safety of everyone on the unit. Staff will offer support and interventions as requested or required.

## 2021-04-26 NOTE — PLAN OF CARE
Problem: Depressive Behavior With or Without Suicide Precautions:  Goal: Able to verbalize and/or display a decrease in depressive symptoms  Description: Able to verbalize and/or display a decrease in depressive symptoms  Outcome: Met This Shift  Goal: Ability to disclose and discuss suicidal ideas will improve  Description: Ability to disclose and discuss suicidal ideas will improve  Outcome: Met This Shift     Problem: Depressive Behavior With or Without Suicide Precautions:  Goal: Able to verbalize acceptance of life and situations over which he or she has no control  Description: Able to verbalize acceptance of life and situations over which he or she has no control  Outcome: Not Met This Shift

## 2021-04-26 NOTE — PROGRESS NOTES
Group Therapy Note    Patient attended goals group and stated daily goal as to eat healthy three meals instead of one a day. Group Therapy Note    Date: 4/26/2021  Start Time: 9:45  End Time: 10:15  Number of Participants: 14    Type of Group: Psychoeducation    Wellness Binder Information  Module Name: Self-care  Session Number: NA    Patient's Goal: To id ways to take care of oneself on a daily basis. Notes: Attended group and was able to id positive ways to take care of self. Status After Intervention:  Unchanged    Participation Level:  Active Listener    Participation Quality: Attentive      Speech:  hesitant      Thought Process/Content: Linear      Affective Functioning: Blunted      Mood: anxious and depressed      Level of consciousness:  Alert      Response to Learning: Progressing to goal      Endings: None Reported    Modes of Intervention: Education      Discipline Responsible: Psychoeducational Specialist      Signature:  RADHA Sidhu

## 2021-04-26 NOTE — GROUP NOTE
Group Therapy Note    Date: 4/26/2021    Group Start Time: 1340  Group End Time: 4552  Group Topic: Cognitive Skills    [facility] I    ROCHELLE Yo        Group Therapy Note    Attendees: 13         Patient's Goal:  Pt will be able to participate in class activity re: grounding and will be able to identify components of mental, physical, and soothing grounding techniques. Notes: Pt active in class discussion. Status After Intervention:  Improved    Participation Level:  Active Listener and Interactive    Participation Quality: Appropriate, Attentive, Sharing and Supportive      Speech:  normal      Thought Process/Content: Logical      Affective Functioning: Blunted      Mood: depressed      Level of consciousness:  Alert, Oriented x4 and Attentive      Response to Learning: Able to verbalize current knowledge/experience, Able to verbalize/acknowledge new learning and Progressing to goal      Endings: None Reported    Modes of Intervention: Education, Support, Socialization, Problem-solving and Activity      Discipline Responsible: /Counselor      Signature:  ROCHELLE Yo

## 2021-04-27 PROCEDURE — 1240000000 HC EMOTIONAL WELLNESS R&B

## 2021-04-27 PROCEDURE — 6370000000 HC RX 637 (ALT 250 FOR IP): Performed by: PSYCHIATRY & NEUROLOGY

## 2021-04-27 PROCEDURE — 99231 SBSQ HOSP IP/OBS SF/LOW 25: CPT | Performed by: NURSE PRACTITIONER

## 2021-04-27 PROCEDURE — 6370000000 HC RX 637 (ALT 250 FOR IP): Performed by: NURSE PRACTITIONER

## 2021-04-27 RX ADMIN — LOPERAMIDE HYDROCHLORIDE 2 MG: 2 CAPSULE ORAL at 07:25

## 2021-04-27 RX ADMIN — LISINOPRIL 10 MG: 10 TABLET ORAL at 09:21

## 2021-04-27 RX ADMIN — DIVALPROEX SODIUM 250 MG: 250 TABLET, DELAYED RELEASE ORAL at 09:20

## 2021-04-27 RX ADMIN — TRAZODONE HYDROCHLORIDE 25 MG: 50 TABLET ORAL at 21:33

## 2021-04-27 RX ADMIN — LOPERAMIDE HYDROCHLORIDE 2 MG: 2 CAPSULE ORAL at 13:27

## 2021-04-27 RX ADMIN — TAMSULOSIN HYDROCHLORIDE 0.4 MG: 0.4 CAPSULE ORAL at 21:33

## 2021-04-27 RX ADMIN — AMLODIPINE BESYLATE 5 MG: 5 TABLET ORAL at 09:21

## 2021-04-27 RX ADMIN — DIVALPROEX SODIUM 250 MG: 250 TABLET, DELAYED RELEASE ORAL at 21:33

## 2021-04-27 RX ADMIN — LOPERAMIDE HYDROCHLORIDE 2 MG: 2 CAPSULE ORAL at 21:33

## 2021-04-27 ASSESSMENT — PAIN SCALES - GENERAL: PAINLEVEL_OUTOF10: 0

## 2021-04-27 NOTE — PROGRESS NOTES
Patient has been out on the unit, pleasant, calm, and cooperative. Patient denies depression but does complain of slight anxiety after speaking to his grandchildren. Patient denies all and accepts medications without issue. Patient is encouraged to continue to work towards discharge goal by complying with medications, attending groups and to seek staff if feelings are overwhelming. Environmental rounds completed per unit policy to maintain safety of everyone on the unit. Staff will offer support and interventions as requested or required.

## 2021-04-27 NOTE — PROGRESS NOTES
Group Therapy Note  Patient attended goals group and stated daily goal as to not to be such a smart ass. Group Therapy Note    Date: 4/27/2021  Start Time: 10:00  End Time:  10:30  Number of Participants:13    Type of Group: Psychoeducation    Wellness Binder Information  Module Name: Self-esteem  Session Number:  NA    Patient's Goal:  To id positive affirmations to improve self-esteem. Notes: Attended group and was able to participate in positive affirmations. Status After Intervention:  Improved    Participation Level:  Active Listener    Participation Quality: Attentive and Sharing      Speech:  hesitant      Thought Process/Content: Linear      Affective Functioning: Flat      Mood: depressed      Level of consciousness:  Alert      Response to Learning: Progressing to goal      Endings: None Reported    Modes of Intervention: Education      Discipline Responsible: Psychoeducational Specialist      Signature:  RADHA Mason

## 2021-04-27 NOTE — PLAN OF CARE
Problem: Depressive Behavior With or Without Suicide Precautions:  Goal: Able to verbalize and/or display a decrease in depressive symptoms  Description: Able to verbalize and/or display a decrease in depressive symptoms  4/26/2021 2218 by Floyd Le RN  Outcome: Met This Shift     Problem: Depressive Behavior With or Without Suicide Precautions:  Goal: Ability to disclose and discuss suicidal ideas will improve  Description: Ability to disclose and discuss suicidal ideas will improve  Outcome: Met This Shift     Problem: Depressive Behavior With or Without Suicide Precautions:  Goal: Absence of self-harm  Description: Absence of self-harm  Outcome: Met This Shift

## 2021-04-27 NOTE — CARE COORDINATION
Marla spoke with pt's step-daughter Breann (ROSALVA signed). Miguel Angel Mendez states that this pt has isolated himself since his wife  in August. Miguel Angel Mendez states that she is concerned with the pt's mental health when he is drinking. Pt's step daughter is supportive and states this pt can come over anytime, as she only lives two blocks away. Pt's step daughter Miguel Angel Mendez states that this pt no longer has access to weapons.

## 2021-04-27 NOTE — GROUP NOTE
Group Therapy Note    Date: 4/27/2021    Group Start Time: 1330  Group End Time: 7851  Group Topic: Cognitive Skills    [facility]    MARILU Tobar LSW        Group Therapy Note    Attendees: 7         Patient's Goal:  Pt will be able to successfully follow a guided meditation     Notes:  Pt participated and made connections in group    Status After Intervention:  Improved    Participation Level:  Active Listener    Participation Quality: Appropriate, Attentive, Sharing and Supportive      Speech:  normal      Thought Process/Content: Logical      Affective Functioning: Congruent      Mood: depressed      Level of consciousness:  Alert and Oriented x4      Response to Learning: Able to verbalize current knowledge/experience      Endings: None Reported    Modes of Intervention: Education, Support, Socialization, Exploration, Clarifying, Problem-solving and Activity      Discipline Responsible: /Counselor      Signature:  MARILU Tobar LSW

## 2021-04-27 NOTE — PLAN OF CARE
Problem: Depressive Behavior With or Without Suicide Precautions:  Goal: Able to verbalize and/or display a decrease in depressive symptoms  Description: Able to verbalize and/or display a decrease in depressive symptoms  4/27/2021 1109 by Boris Dean RN  Outcome: Ongoing     Problem: Depressive Behavior With or Without Suicide Precautions:  Goal: Ability to disclose and discuss suicidal ideas will improve  Description: Ability to disclose and discuss suicidal ideas will improve  4/27/2021 1109 by Boris Dean RN  Outcome: Met This Shift     Problem: Depressive Behavior With or Without Suicide Precautions:  Goal: Absence of self-harm  Description: Absence of self-harm  4/27/2021 1109 by Boris Dean RN  Outcome: Met This Shift     Pt denies SI HI and AVH. Pt rates anxiety 2/10. Pt is bright, pleasant, calm and cooperative. Pt out on unit social with peers. Pt taking medication without issue and attending group. Will continue to monitor.

## 2021-04-27 NOTE — PROGRESS NOTES
BEHAVIORAL HEALTH FOLLOW-UP NOTE     4/27/2021     Patient was seen and examined in person, Chart reviewed   Patient's case discussed with staff/team    At personally seen and assessed, and evaluated this patient this morning and agree with the below assessment by the medical student. Mental status examination reveals a 66-year-old  male with average hygiene and grooming who appears [facility] He appears in no distress. Psychomotor reveals no agitation, retardation, abnormal posture or involuntary movements. Speech is clear, well articulated there is no delayed latency of response. Eye contact is adequate during assessment. Mood is \"okay. \"  Affect is relaxed, congruent with stated mood. Thought process is linear and goal-directed there is no looseness of associations or flight of ideas. Thought content is devoid of auditory or visual hallucinations there are no overt or covert signs of psychosis or paranoia. Patient denies suicidal or homicidal ideation intent or plan. Cognitive function appears to be at baseline. Memory is intact for conversation. Insight, judgment and impulse control are improving. He is alert and oriented to person place time and situation. Chief Complaint: \"I am feeling good\"    Interim History:   Patient assessed in the quiet room, he has been observed up on the unit, social with peers. He is taking his medications, he is attending groups. When talking to him today he seems genuinely in good spirits. When asked if the medication he is on is helping he relayed that from what he understands it is being given to level out his mood and he does report that his mood is stable. In addition he notes that he trusts the healthcare professionals and wants to be complaint and follow through with their recommendations. He shares that he talked to his grandson today and hopes to get out by Thursday to see his last soccer game.  Talking to his grandson and the thought of seeing him in sports has him in good spirits. When asking the patient about his admission he has better insight sharing that he understands his family's concerns. He also exhibits better judgement discussing options and people to see when he is out to address the grief he feels surround his wife's passing in August of 2020. The patient denies suicidal ideation. He also denies homicidal ideation. He does not feel hopeless and is future oriented. He continues to deny auditory or visual hallucinations. Appetite:   [x] Normal/Unchanged  [] Increased  [] Decreased      Sleep:       [x] Normal/Unchanged  [] Fair       [] Poor              Energy:    [x] Normal/Unchanged  [] Increased  [] Decreased        SI [] Present  [x] Absent    HI  []Present  [x] Absent     Aggression:  [] yes  [x] no    Patient is [x] able  [] unable to CONTRACT FOR SAFETY     PAST MEDICAL/PSYCHIATRIC HISTORY:   Past Medical History:   Diagnosis Date    Hyperlipidemia     Hypertension     Nausea & vomiting     Urinary frequency        FAMILY/SOCIAL HISTORY:  History reviewed. No pertinent family history. Social History     Socioeconomic History    Marital status:      Spouse name: Not on file    Number of children: Not on file    Years of education: Not on file    Highest education level: Not on file   Occupational History    Not on file   Social Needs    Financial resource strain: Not on file    Food insecurity     Worry: Not on file     Inability: Not on file    Transportation needs     Medical: Not on file     Non-medical: Not on file   Tobacco Use    Smoking status: Former Smoker     Packs/day: 0.25    Smokeless tobacco: Never Used    Tobacco comment: Quit 12/2016   Substance and Sexual Activity    Alcohol use:  Yes     Alcohol/week: 14.0 standard drinks     Types: 14 Shots of liquor per week    Drug use: No    Sexual activity: Not on file   Lifestyle    Physical activity     Days per week: Not on file     Minutes per session: Not on file    Stress: Not on file   Relationships    Social connections     Talks on phone: Not on file     Gets together: Not on file     Attends Spiritism service: Not on file     Active member of club or organization: Not on file     Attends meetings of clubs or organizations: Not on file     Relationship status: Not on file    Intimate partner violence     Fear of current or ex partner: Not on file     Emotionally abused: Not on file     Physically abused: Not on file     Forced sexual activity: Not on file   Other Topics Concern    Not on file   Social History Narrative    Not on file           ROS:  [x] All negative/unchanged except if checked.  Explain positive(checked items) below:  [] Constitutional  [] Eyes  [] Ear/Nose/Mouth/Throat  [] Respiratory  [] CV  [] GI  []   [] Musculoskeletal  [] Skin/Breast  [] Neurological  [] Endocrine  [] Heme/Lymph  [] Allergic/Immunologic    Explanation:     MEDICATIONS:    Current Facility-Administered Medications:     nitroGLYCERIN (NITROSTAT) SL tablet 0.4 mg, 0.4 mg, Sublingual, Q5 Min PRN, Shayna Luisitoey, APRN - CNP    amLODIPine (NORVASC) tablet 5 mg, 5 mg, Oral, Daily, Shayna Cookey, APRN - CNP, 5 mg at 04/27/21 3073    lisinopril (PRINIVIL;ZESTRIL) tablet 10 mg, 10 mg, Oral, Daily, Shayna Cookey, APRN - CNP, 10 mg at 04/27/21 5825    tamsulosin (FLOMAX) capsule 0.4 mg, 0.4 mg, Oral, Daily, Shayna Cookey, APRN - CNP, 0.4 mg at 04/26/21 1452    ibuprofen (ADVIL;MOTRIN) tablet 600 mg, 600 mg, Oral, Q8H PRN, Shayna Cookey, APRN - CNP    loperamide (IMODIUM) capsule 2 mg, 2 mg, Oral, 4x Daily PRN, Shayna Cookey, APRN - CNP, 2 mg at 04/27/21 0725    acetaminophen (TYLENOL) tablet 650 mg, 650 mg, Oral, Q4H PRN, Lambert Mayer MD    magnesium hydroxide (MILK OF MAGNESIA) 400 MG/5ML suspension 30 mL, 30 mL, Oral, Daily PRN, Lambert Mayer MD    aluminum & magnesium hydroxide-simethicone (MAALOX) 200-200-20 MG/5ML suspension 30 mL, 30 mL, Oral, PRN, Pam Limon MD    haloperidol lactate (HALDOL) injection 3 mg, 3 mg, Intramuscular, Q6H PRN **OR** haloperidol (HALDOL) tablet 3 mg, 3 mg, Oral, Q6H PRN, Pam Limon MD    traZODone (DESYREL) tablet 25 mg, 25 mg, Oral, Nightly PRN, Pam Limon MD, 25 mg at 04/26/21 2120    chlordiazePOXIDE (LIBRIUM) capsule 25 mg, 25 mg, Oral, Q6H PRN, Pam Limon MD    divalproex (DEPAKOTE) DR tablet 250 mg, 250 mg, Oral, BID, Pam Limon MD, 250 mg at 04/27/21 0920    thiamine mononitrate tablet 100 mg, 100 mg, Oral, Daily, Pam Limon MD, 100 mg at 04/26/21 1029    multivitamin 1 tablet, 1 tablet, Oral, Daily, Pam Limon MD, 1 tablet at 04/26/21 1029      Examination:  BP (!) 184/84   Pulse 77   Temp 98.5 °F (36.9 °C) (Temporal)   Resp 16   Ht 5' 8\" (1.727 m)   Wt 144 lb (65.3 kg)   SpO2 98%   BMI 21.90 kg/m²   Gait - steady  Medication side effects(SE):     Mental Status Examination:    Level of consciousness:  within normal limits   Appearance:  good grooming and good hygiene  Behavior/Motor:  no abnormalities noted  Attitude toward examiner:  cooperative  Speech:  spontaneous, normal rate and normal volume   Mood: \"good\"  Affect:  mood congruent  Thought processes:  linear, goal directed and coherent   Thought content:  Discharge oriented. denies SI/HI or hallucinations  Cognition:  oriented to person, place, and time   Concentration intact  Insight fair   Judgement fair     ASSESSMENT:  Patient symptoms are:  [] Well controlled  [x] Improving  [] Worsening  [] No change      Diagnosis:   Principal Problem:    Mixed bipolar II disorder (HCC)  Active Problems:    Depression with suicidal ideation  Resolved Problems:    * No resolved hospital problems.  *      LABS:    Recent Labs     04/25/21  0231   WBC 6.2   HGB 12.6        Recent Labs     04/25/21  0231      K 4.0      CO2 24   BUN 15   CREATININE 1.7*   GLUCOSE 78     Recent Labs     04/25/21  0231   BILITOT <0.2   ALKPHOS 80   AST 48*   ALT 12     Lab Results   Component Value Date    LABAMPH NOT DETECTED 04/25/2021    BARBSCNU NOT DETECTED 04/25/2021    LABBENZ POSITIVE 04/25/2021    LABMETH NOT DETECTED 04/25/2021    OPIATESCREENURINE NOT DETECTED 04/25/2021    PHENCYCLIDINESCREENURINE NOT DETECTED 04/25/2021    ETOH <10 04/25/2021     No results found for: TSH, FREET4  No results found for: LITHIUM  No results found for: VALPROATE, CBMZ      Treatment Plan:  Continue Depakote 250MG BID  Reviewed current Medications with the patient. Risks, benefits, side effects, drug-to-drug interactions and alternatives to treatment were discussed. Collateral information will be obtained from family  CD evaluation  Encourage patient to attend group and other milieu activities.   Discharge planning discussed with the patient and treatment team.        PSYCHOTHERAPY/COUNSELING:  [x] Therapeutic interview  [x] Supportive  [] CBT  [] Ongoing  [] Other    [x] Patient continues to need, on a daily basis, active treatment furnished directly by or requiring the supervision of inpatient psychiatric personnel      Anticipated Length of stay: 3 to 5 days based on stability            Electronically signed by Abebe Head on 4/27/2021 at 1:21 PM

## 2021-04-28 PROCEDURE — 6370000000 HC RX 637 (ALT 250 FOR IP): Performed by: PSYCHIATRY & NEUROLOGY

## 2021-04-28 PROCEDURE — 6370000000 HC RX 637 (ALT 250 FOR IP): Performed by: NURSE PRACTITIONER

## 2021-04-28 PROCEDURE — 99231 SBSQ HOSP IP/OBS SF/LOW 25: CPT | Performed by: NURSE PRACTITIONER

## 2021-04-28 PROCEDURE — 1240000000 HC EMOTIONAL WELLNESS R&B

## 2021-04-28 RX ORDER — TRAZODONE HYDROCHLORIDE 50 MG/1
50 TABLET ORAL NIGHTLY PRN
Status: DISCONTINUED | OUTPATIENT
Start: 2021-04-28 | End: 2021-04-29 | Stop reason: HOSPADM

## 2021-04-28 RX ADMIN — DIVALPROEX SODIUM 250 MG: 250 TABLET, DELAYED RELEASE ORAL at 09:36

## 2021-04-28 RX ADMIN — AMLODIPINE BESYLATE 5 MG: 5 TABLET ORAL at 09:36

## 2021-04-28 RX ADMIN — LOPERAMIDE HYDROCHLORIDE 2 MG: 2 CAPSULE ORAL at 06:53

## 2021-04-28 RX ADMIN — TAMSULOSIN HYDROCHLORIDE 0.4 MG: 0.4 CAPSULE ORAL at 21:07

## 2021-04-28 RX ADMIN — DIVALPROEX SODIUM 250 MG: 250 TABLET, DELAYED RELEASE ORAL at 21:07

## 2021-04-28 RX ADMIN — TRAZODONE HYDROCHLORIDE 50 MG: 50 TABLET ORAL at 21:07

## 2021-04-28 RX ADMIN — LISINOPRIL 10 MG: 10 TABLET ORAL at 09:37

## 2021-04-28 ASSESSMENT — PAIN SCALES - GENERAL
PAINLEVEL_OUTOF10: 0

## 2021-04-28 NOTE — PLAN OF CARE
Problem: Depressive Behavior With or Without Suicide Precautions:  Goal: Able to verbalize and/or display a decrease in depressive symptoms  Description: Able to verbalize and/or display a decrease in depressive symptoms  4/27/2021 2155 by Andrew Galindo RN  Outcome: Met This Shift     Problem: Depressive Behavior With or Without Suicide Precautions:  Goal: Ability to disclose and discuss suicidal ideas will improve  Description: Ability to disclose and discuss suicidal ideas will improve  4/27/2021 2155 by Andrew Galindo RN  Outcome: Met This Shift

## 2021-04-28 NOTE — PROGRESS NOTES
Spiritual Support Group Note    Number of Participants in Group:    11                    Time:   2;30    Goal: Relief from isolation and loneliness             Evie Sharing             Self-understanding and gain insight              Acceptance and belonging            Recognize they are not alone                Socialization             Empowerment       Encouragement    Topic:  [] Spiritual Wellness and Self Care                  [x] Hope                     [] Connecting with Divine/Others        [] Thankfulness and Gratitude               [x]  Meaningfulness and Purpose               [] Forgiveness               [] Peace               [] Connect to [employer]      [] Other    Participation Level:   [x] Active Listener   [] Minimal   [] Monopolizing   [] Interactive   [] No Participation   []  Other:     Attention:   [x] Alert   [] Distractible   [] Drowsy   [] Poor   [] Other:    Manner:   [x] Cooperative   [] Suspicious   [] Withdrawn   [] Guarded   [] Irritable   [] Inhospitable   [] Other:     Others Comments from Group:

## 2021-04-28 NOTE — PLAN OF CARE
Problem: Depressive Behavior With or Without Suicide Precautions:  Goal: Able to verbalize and/or display a decrease in depressive symptoms  Description: Able to verbalize and/or display a decrease in depressive symptoms  4/28/2021 1109 by Toney Alvarenga RN  Outcome: Ongoing     Problem: Depressive Behavior With or Without Suicide Precautions:  Goal: Ability to disclose and discuss suicidal ideas will improve  Description: Ability to disclose and discuss suicidal ideas will improve  4/28/2021 1109 by Toney Alvarenga RN  Outcome: Met This Shift     Problem: Depressive Behavior With or Without Suicide Precautions:  Goal: Absence of self-harm  Description: Absence of self-harm  Outcome: Met This Shift       Pt denies SI HI and AVH. Pt rates anxiety2/10. Pt is bright, pleasant and cooperative. Pt is social with peers. He is taking medication with no issues. Will continue to monitor.

## 2021-04-28 NOTE — PLAN OF CARE
[id number] [facility]  Day 3 Interdisciplinary Treatment Plan NOTE    Review Date & Time: 4/28/2021  0900    Patient was in treatment team    Admission Type:   Admission Type: Involuntary    Reason for admission:  Reason for Admission: \"I got a little too drunk last night and fell down, my granddaughters got scared\"  Estimated Length of Stay Update:  3-5 days  Estimated Discharge Date Update: 4/30/2021    PATIENT STRENGTHS:  Patient Strengths Strengths: Communication, Social Skills, Positive Support  Patient Strengths and Limitations:Limitations: Multiple barriers to leisure interests, Difficulty problem solving/relies on others to help solve problems  Addictive Behavior:Addictive Behavior  In the past 3 months, have you felt or has someone told you that you have a problem with:  : None  Do you have a history of Chemical Use?: No  Do you have a history of Alcohol Use?: No  Do you have a history of Street Drug Abuse?: No  Histroy of Prescripton Drug Abuse?: No  Medical Problems:  Past Medical History:   Diagnosis Date    Hyperlipidemia     Hypertension     Nausea & vomiting     Urinary frequency        Risk:  Fall RiskTotal: 73  Blue Scale Blue Scale Score: 22  BVC Total: 0  Change in scores no.  Changes to plan of Care no    Status EXAM:   Status and Exam  Normal: Yes  Facial Expression: Brightened  Affect: Congruent  Level of Consciousness: Alert  Mood:Normal: No  Mood: Anxious  Motor Activity:Normal: Yes  Interview Behavior: Cooperative  Preception: Aylett to Person, Gwynneth Ollie to Time, Aylett to Place, Aylett to Situation  Attention:Normal: Yes  Thought Processes: Circumstantial  Thought Content:Normal: No  Thought Content: Preoccupations  Hallucinations: None  Delusions: No  Memory:Normal: Yes  Memory: Poor Recent  Insight and Judgment: No  Insight and Judgment: Poor Insight, Poor Judgment  Present Suicidal Ideation: No  Present Homicidal Ideation: No    Daily Assessment Last Entry:             Patient Currently in Pain: Denies  Daily Nutrition (WDL): Within Defined Limits    Patient Monitoring:  Frequency of Checks: 4 times per hour, close    Psychiatric Symptoms:   Depression Symptoms  Depression Symptoms: No problems reported or observed. Anxiety Symptoms  Anxiety Symptoms: Generalized  Yamilka Symptoms  Yamilka Symptoms: No problems reported or observed. Psychosis Symptoms  Delusion Type: No problems reported or observed. Suicide Risk CSSR-S:  1) Within the past month, have you wished you were dead or wished you could go to sleep and not wake up? : No  2) Have you actually had any thoughts of killing yourself? : No  6) Have you ever done anything, started to do anything, or prepared to do anything to end your life?: No  Change in Result no Change in Plan of care no      EDUCATION:   Learner Progress Toward Treatment Goals: Reviewed results and recommendations of this team and Reviewed goals and plan of care    Method: Small group    Outcome: Verbalized understanding    PATIENT GOALS: no goals at this time    PLAN/TREATMENT RECOMMENDATIONS UPDATE: Pt will take prescribed medication. Pt will attend and participate in group. Will monitor and offer support.      GOALS UPDATE:   Time frame for Short-Term Goals: 1-3 days      Lori Pedro RN

## 2021-04-28 NOTE — CARE COORDINATION
Pt specifically requesting [facility] for outpatient services. [facility] states that this pt was a former pt of theirs, and is requesting discharge paperwork when the pt is discharged ([phone]). Ash asked if she could have an appointment to put down on the pt's discharge paperwork. The  states that they are not allowed to do this, as they must talk to the pt when he is discharged first. When this  faxes the discharge paperwork over, they will call the pt and set up appointments. Ash must ensure this is ok with her supervisor first.      EDIT:  handed the pt Raj's number, to see if the pt himself would be able to secure a date and time, per ash supervisor recommendation. Pt agreeable and states that he will call after group.

## 2021-04-28 NOTE — GROUP NOTE
Group Therapy Note    Date: 4/28/2021    Group Start Time: 1000  Group End Time: 6241  Group Topic: Psychoeducation    [facility]    Lucia Powell, CTRS        Group Therapy Note    Number of participants: 13  Type of group: Psychoeducation  Mode of intervention: Education, Support, Socialization, Exploration, Clarifying, and Problem-solving  Topic: Gratitude   Objective: Pt will identify 3 ways to practice gratitude in recovery. Patient's Goal:  \"Stay collected, cool, and calm\"     Notes:  Pt interactive during group sharing 3 ways to practice gratitude in recovery. Pt gave support and feedback to others. Pt shared benefits of gratitude and how gratitude has helped him in day to day situations. Status After Intervention:  Improved    Participation Level:  Active Listener and Interactive    Participation Quality: Appropriate, Attentive, Sharing and Supportive      Speech:  normal      Thought Process/Content: Logical      Affective Functioning: Congruent      Mood: euthymic      Level of consciousness:  Alert, Oriented x4 and Attentive      Response to Learning: Able to verbalize current knowledge/experience, Able to verbalize/acknowledge new learning, Able to retain information, Capable of insight, Able to change behavior and Progressing to goal      Endings: None Reported    Modes of Intervention: Education, Support, Socialization, Exploration, Clarifying and Problem-solving

## 2021-04-28 NOTE — PROGRESS NOTES
Patient has been out on the unit, pleasant, calm, and cooperative. Patient denies all and denies depression but does state that he's feeling a little nervous about talking with the doctor tomorrow. Patient states that he just wants to go home, and feels ready for discharge. Patient accepted medications without issue. Patient is encouraged to continue to work towards discharge goal by complying with medications, attending groups and to seek staff if feelings are overwhelming. Environmental rounds completed per unit policy to maintain safety of everyone on the unit. Staff will offer support and interventions as requested or required.

## 2021-04-28 NOTE — GROUP NOTE
Group Therapy Note     Date: 4/28/2021     Group Start Time: 1415  Group End Time: 8103  Group Topic: Cognitive Skills     [facility]    ROCHELLE Mclaughlin           Group Therapy Note     Attendees: 14           Patient's Goal:  Pt will participate in discussion of anger management skills.     Notes:  Pt was an active participant in class discussion.      Status After Intervention:  Improved     Participation Level:  Active Listener and Interactive     Participation Quality: Appropriate, Attentive, Sharing and Supportive        Speech:  normal        Thought Process/Content: Logical        Affective Functioning: Congruent        Mood: depressed        Level of consciousness:  Alert, Oriented x4 and Attentive        Response to Learning: Able to verbalize current knowledge/experience, Able to verbalize/acknowledge new learning and Progressing to goal        Endings: None Reported     Modes of Intervention: Education, Support, Socialization and Problem-solving        Discipline Responsible: /Counselor        Signature:  ROCHELLE Cooper

## 2021-04-28 NOTE — PROGRESS NOTES
BEHAVIORAL HEALTH FOLLOW-UP NOTE     4/28/2021     Patient was seen and examined in person, Chart reviewed   Patient's case discussed with staff/team    Chief Complaint: \"I am feeling good\"    Interim History:   Patient observed up on the unit and attending groups social with peers. He is taking his medications and is tolerating them well. He tells me he feels good. States that he feels he is doing better and is ready to go. When talking to him today he seems genuinely in good spirits. When asked if the medication he is on is helping he relayed that from what he understands it is being given to level out his mood and he does report that his mood is stable. In addition he notes that he trusts the healthcare professionals and wants to be complaint and follow through with their recommendations. Tells me he will follow with [facility]'s counseling when discharged and is looking into grief counseling through [address] [place] [address]. When asking the patient about his admission he has better insight sharing that he understands his family's concerns. He also exhibits better judgement discussing options and people to see when he is out to address the grief he feels surround his wife's passing in August of 2020. The patient denies suicidal ideation. He also denies homicidal ideation. He does not feel hopeless and is future oriented. He continues to deny auditory or visual hallucinations.      Appetite:   [x] Normal/Unchanged  [] Increased  [] Decreased      Sleep:       [x] Normal/Unchanged  [] Fair       [] Poor              Energy:    [x] Normal/Unchanged  [] Increased  [] Decreased        SI [] Present  [x] Absent    HI  []Present  [x] Absent     Aggression:  [] yes  [x] no    Patient is [x] able  [] unable to CONTRACT FOR SAFETY     PAST MEDICAL/PSYCHIATRIC HISTORY:   Past Medical History:   Diagnosis Date    Hyperlipidemia     Hypertension     Nausea & vomiting     Urinary frequency        FAMILY/SOCIAL HISTORY:  History reviewed. No pertinent family history. Social History     Socioeconomic History    Marital status:      Spouse name: Not on file    Number of children: Not on file    Years of education: Not on file    Highest education level: Not on file   Occupational History    Not on file   Social Needs    Financial resource strain: Not on file    Food insecurity     Worry: Not on file     Inability: Not on file    Transportation needs     Medical: Not on file     Non-medical: Not on file   Tobacco Use    Smoking status: Former Smoker     Packs/day: 0.25    Smokeless tobacco: Never Used    Tobacco comment: Quit 12/2016   Substance and Sexual Activity    Alcohol use: Yes     Alcohol/week: 14.0 standard drinks     Types: 14 Shots of liquor per week    Drug use: No    Sexual activity: Not on file   Lifestyle    Physical activity     Days per week: Not on file     Minutes per session: Not on file    Stress: Not on file   Relationships    Social connections     Talks on phone: Not on file     Gets together: Not on file     Attends Scientologist service: Not on file     Active member of club or organization: Not on file     Attends meetings of clubs or organizations: Not on file     Relationship status: Not on file    Intimate partner violence     Fear of current or ex partner: Not on file     Emotionally abused: Not on file     Physically abused: Not on file     Forced sexual activity: Not on file   Other Topics Concern    Not on file   Social History Narrative    Not on file           ROS:  [x] All negative/unchanged except if checked.  Explain positive(checked items) below:  [] Constitutional  [] Eyes  [] Ear/Nose/Mouth/Throat  [] Respiratory  [] CV  [] GI  []   [] Musculoskeletal  [] Skin/Breast  [] Neurological  [] Endocrine  [] Heme/Lymph  [] Allergic/Immunologic    Explanation:     MEDICATIONS:    Current Facility-Administered Medications:     nitroGLYCERIN (NITROSTAT) SL tablet 0.4 mg, 0.4 mg, Sublingual, Q5 Min PRN, [facility], APRN - CNP    amLODIPine (NORVASC) tablet 5 mg, 5 mg, Oral, Daily, [facility], APRN - CNP, 5 mg at 04/28/21 0936    lisinopril (PRINIVIL;ZESTRIL) tablet 10 mg, 10 mg, Oral, Daily, [facility], APRN - CNP, 10 mg at 04/28/21 7484    tamsulosin (FLOMAX) capsule 0.4 mg, 0.4 mg, Oral, Daily, [facility], APRN - CNP, 0.4 mg at 04/27/21 2133    ibuprofen (ADVIL;MOTRIN) tablet 600 mg, 600 mg, Oral, Q8H PRN, [facility], APRN - CNP    loperamide (IMODIUM) capsule 2 mg, 2 mg, Oral, 4x Daily PRN, [facility], APRN - CNP, 2 mg at 04/28/21 3074    acetaminophen (TYLENOL) tablet 650 mg, 650 mg, Oral, Q4H PRN, Jose Hayes MD    magnesium hydroxide (MILK OF MAGNESIA) 400 MG/5ML suspension 30 mL, 30 mL, Oral, Daily PRN, Jose Hayes MD    aluminum & magnesium hydroxide-simethicone (MAALOX) 200-200-20 MG/5ML suspension 30 mL, 30 mL, Oral, PRN, Jose Hayes MD    haloperidol lactate (HALDOL) injection 3 mg, 3 mg, Intramuscular, Q6H PRN **OR** haloperidol (HALDOL) tablet 3 mg, 3 mg, Oral, Q6H PRN, Jose Hayes MD    traZODone (DESYREL) tablet 25 mg, 25 mg, Oral, Nightly PRN, Jose Hayes MD, 25 mg at 04/27/21 2133    chlordiazePOXIDE (LIBRIUM) capsule 25 mg, 25 mg, Oral, Q6H PRN, Jose Hayes MD    divalproex (DEPAKOTE) DR tablet 250 mg, 250 mg, Oral, BID, Jose Hayes MD, 250 mg at 04/28/21 4038    thiamine mononitrate tablet 100 mg, 100 mg, Oral, Daily, Jose Hayes MD, 100 mg at 04/26/21 1029    multivitamin 1 tablet, 1 tablet, Oral, Daily, Jose Hayes MD, 1 tablet at 04/26/21 1029      Examination:  BP (!) 196/88   Pulse 70   Temp 98.8 °F (37.1 °C) (Temporal)   Resp 16   Ht 5' 8\" (1.727 m)   Wt 144 lb (65.3 kg)   SpO2 99%   BMI 21.90 kg/m²   Gait - steady  Medication side effects(SE):     Mental Status Examination:    Level of consciousness:  within normal limits   Appearance:  good grooming and good hygiene  Behavior/Motor:  no abnormalities noted  Attitude toward examiner:  cooperative  Speech:  spontaneous, normal rate and normal volume   Mood: \"good\"  Affect:  mood congruent  Thought processes:  linear, goal directed and coherent   Thought content:  Discharge oriented. denies SI/HI or hallucinations  Cognition:  oriented to person, place, and time   Concentration intact  Insight fair   Judgement fair     ASSESSMENT:  Patient symptoms are:  [x] Well controlled  [x] Improving  [] Worsening  [] No change      Diagnosis:   Principal Problem:    Mixed bipolar II disorder (HCC)  Active Problems:    Depression with suicidal ideation  Resolved Problems:    * No resolved hospital problems. *      LABS:    No results for input(s): WBC, HGB, PLT in the last 72 hours. No results for input(s): NA, K, CL, CO2, BUN, CREATININE, GLUCOSE in the last 72 hours. No results for input(s): BILITOT, ALKPHOS, AST, ALT in the last 72 hours. Lab Results   Component Value Date    LABAMPH NOT DETECTED 04/25/2021    BARBSCNU NOT DETECTED 04/25/2021    LABBENZ POSITIVE 04/25/2021    LABMETH NOT DETECTED 04/25/2021    OPIATESCREENURINE NOT DETECTED 04/25/2021    PHENCYCLIDINESCREENURINE NOT DETECTED 04/25/2021    ETOH <10 04/25/2021     No results found for: TSH, FREET4  No results found for: LITHIUM  No results found for: VALPROATE, CBMZ      Treatment Plan:  The patient's diagnosis, treatment plan, medication management were formulated after patient was seen directly by the attending physician and myself and all relevant documentation was reviewed. Risk, benefit, side effects, possible outcomes of the medication and alternatives discussed with the patient and the patient demonstrated understanding. The patient was also educated that the outcome of treatment will depend on the medication compliance as directed by the prescribers along with regular follow-up, compliance with the labs and other work-up, as clinically indicated. Continue Depakote 250MG BID      Collateral information will be obtained from family  CD evaluation  Encourage patient to attend group and other milieu activities.   Discharge planning discussed with the patient and treatment team.        PSYCHOTHERAPY/COUNSELING:  [x] Therapeutic interview  [x] Supportive  [] CBT  [] Ongoing  [] Other    [x] Patient continues to need, on a daily basis, active treatment furnished directly by or requiring the supervision of inpatient psychiatric personnel      Anticipated Length of stay: 3 to 5 days based on stability            Electronically signed by ALLAN Gaffney CNP on 4/28/2021 at 1:51 PM

## 2021-04-28 NOTE — CARE COORDINATION
Pt attempted to schedule an appointment date and time himself with [facility]. [facility] states that the pt must discharge prior to scheduling an appointment. Marla notified her supervisor of this.

## 2021-04-29 VITALS
HEART RATE: 58 BPM | BODY MASS INDEX: 21.82 KG/M2 | WEIGHT: 144 LBS | SYSTOLIC BLOOD PRESSURE: 123 MMHG | RESPIRATION RATE: 16 BRPM | TEMPERATURE: 97.8 F | DIASTOLIC BLOOD PRESSURE: 60 MMHG | OXYGEN SATURATION: 100 % | HEIGHT: 68 IN

## 2021-04-29 PROCEDURE — 6370000000 HC RX 637 (ALT 250 FOR IP): Performed by: PSYCHIATRY & NEUROLOGY

## 2021-04-29 PROCEDURE — 99239 HOSP IP/OBS DSCHRG MGMT >30: CPT | Performed by: NURSE PRACTITIONER

## 2021-04-29 PROCEDURE — 6370000000 HC RX 637 (ALT 250 FOR IP): Performed by: NURSE PRACTITIONER

## 2021-04-29 RX ORDER — DIVALPROEX SODIUM 250 MG/1
250 TABLET, DELAYED RELEASE ORAL 2 TIMES DAILY
Qty: 60 TABLET | Refills: 0 | Status: SHIPPED | OUTPATIENT
Start: 2021-04-29 | End: 2021-05-29

## 2021-04-29 RX ORDER — THIAMINE MONONITRATE (VIT B1) 100 MG
100 TABLET ORAL DAILY
Refills: 0 | COMMUNITY
Start: 2021-04-30

## 2021-04-29 RX ORDER — IBUPROFEN 800 MG/1
800 TABLET ORAL EVERY 8 HOURS PRN
Qty: 120 TABLET | Refills: 0 | COMMUNITY
Start: 2021-04-29

## 2021-04-29 RX ORDER — MULTIVITAMIN WITH IRON
1 TABLET ORAL DAILY
Refills: 0 | COMMUNITY
Start: 2021-04-30

## 2021-04-29 RX ORDER — TRAZODONE HYDROCHLORIDE 50 MG/1
50 TABLET ORAL NIGHTLY PRN
Qty: 30 TABLET | Refills: 0 | Status: SHIPPED | OUTPATIENT
Start: 2021-04-29 | End: 2021-05-29

## 2021-04-29 RX ADMIN — LOPERAMIDE HYDROCHLORIDE 2 MG: 2 CAPSULE ORAL at 06:03

## 2021-04-29 RX ADMIN — AMLODIPINE BESYLATE 5 MG: 5 TABLET ORAL at 09:05

## 2021-04-29 RX ADMIN — DIVALPROEX SODIUM 250 MG: 250 TABLET, DELAYED RELEASE ORAL at 09:31

## 2021-04-29 RX ADMIN — LISINOPRIL 10 MG: 10 TABLET ORAL at 09:05

## 2021-04-29 ASSESSMENT — PAIN SCALES - GENERAL: PAINLEVEL_OUTOF10: 0

## 2021-04-29 NOTE — PLAN OF CARE
Pt denies SI,HI and hallucinations. Pt reports anxiety related to wanting to go home. Pt denies feeling depressed. Brightened mood this evening, often jokes and cooperative with staff. Pt has been out on unit all evening and social with peers. CIWA 1.  Medication compliant   Problem: Depressive Behavior With or Without Suicide Precautions:  Goal: Able to verbalize and/or display a decrease in depressive symptoms  Description: Able to verbalize and/or display a decrease in depressive symptoms  4/28/2021 2152 by Glenn Ou  Outcome: Met This Shift     Problem: Depressive Behavior With or Without Suicide Precautions:  Goal: Absence of self-harm  Description: Absence of self-harm  4/28/2021 2152 by Glenn Ou  Outcome: Met This Shift

## 2021-04-29 NOTE — PLAN OF CARE
Problem: Depressive Behavior With or Without Suicide Precautions:  Goal: Able to verbalize acceptance of life and situations over which he or she has no control  Description: Able to verbalize acceptance of life and situations over which he or she has no control  Outcome: Ongoing     Problem: Depressive Behavior With or Without Suicide Precautions:  Goal: Able to verbalize and/or display a decrease in depressive symptoms  Description: Able to verbalize and/or display a decrease in depressive symptoms  4/29/2021 0939 by Ariel Bone RN  Outcome: Ongoing     Problem: Depressive Behavior With or Without Suicide Precautions:  Goal: Ability to disclose and discuss suicidal ideas will improve  Description: Ability to disclose and discuss suicidal ideas will improve  Outcome: Ongoing     Problem: Depressive Behavior With or Without Suicide Precautions:  Goal: Absence of self-harm  Description: Absence of self-harm  4/29/2021 0939 by Ariel Bone RN  Outcome: Ongoing     Patient is pleasant and cooperative. He denies suicidal ideations, homicidal ideations, and hallucinations. He is medication compliant and in control of his behavior. Will continue to monitor and support.

## 2021-04-29 NOTE — CARE COORDINATION
In order to ensure appropriate transition and discharge planning is in place, the following documents have been transmitted to [facility], as the new outpatient provider:     · The d/c diagnosis was transmitted to the next care provider  · The reason for hospitalization was transmitted to the next care provider  · The d/c medications (dosage and indication) were transmitted to the next care provider   · The continuing care plan was transmitted to the next care provider

## 2021-04-29 NOTE — CARE COORDINATION
Per Aleyda Almaraz, pt is requesting follow up appointments at [address] [place] [address] ([phone]), however they will not schedule the appointment until patient discharges home. SW faxed [facility] pt's discharge instructions, they reported that they will contact patient to schedule the appointment. Pt is aware and [facility] contact information is included in pt's discharge instructions. QUE supervisor called [facility] to discuss the barriers to setting up an appointment prior to discharge, no answer or voicemail available. Martine Gasca will call patient tomorrow for follow up call and will assist patient with setting up the appointment if needed.      MARILU Clements, [facility] [place] [address] Work Supervisor

## 2021-04-29 NOTE — GROUP NOTE
Group Therapy Note    Date: 4/29/2021    Group Start Time: 1010  Group End Time: 8448  Group Topic: Psychoeducation    [facility] 7W ACUTE  2    [facility]yer, [facility]        Group Therapy Note    Attendees: 10         Patient's Goal:  [place] better with my brother. Notes: Active and engaged during discussion on problem solving. Able to share experiences with peers related to F.A.C.E. acronym. Status After Intervention:  Improved    Participation Level:  Active Listener and Interactive    Participation Quality: Appropriate, Attentive, Sharing and Supportive      Speech:  normal      Thought Process/Content: Logical      Affective Functioning: Congruent      Mood: euthymic      Level of consciousness:  Alert and Attentive      Response to Learning: Progressing to goal      Endings: None Reported    Modes of Intervention: Education, Support, Socialization and Exploration      Discipline Responsible: Psychoeducational Specialist      Signature:  Rolly De La Rosa [place]

## 2021-04-29 NOTE — PROGRESS NOTES
CLINICAL PHARMACY NOTE: MEDS TO [address] Select Patient?: No  Total # of Prescriptions Filled: 2   The following medications were delivered to the patient:  · depakote dr 250mg  · Trazodone 50mg  Total # of Interventions Completed: 3  Time Spent (min): 30    Additional Documentation:  Delivered to RN

## 2021-04-29 NOTE — GROUP NOTE
Group Therapy Note    Date: 4/28/2021    Group Start Time: 1930  Group End Time: 2030  Group Topic: Relaxation    [facility] 7W ACUTE [address]        Group Therapy Note    Attendees: 8/12             Signature:  Suzan Rasheed

## 2021-04-30 NOTE — FLOWSHEET NOTE
Sw attempted to contact pt for follow up call and to see if pt wass able to obtain an appointment at [facility]. Pt did not answer but sw left a detailed voicemail. Sw will try again later.

## 2024-05-16 RX ORDER — HYDROCODONE BITARTRATE AND ACETAMINOPHEN 5; 325 MG/1; MG/1
1 TABLET ORAL EVERY 6 HOURS PRN
COMMUNITY

## 2024-05-16 NOTE — PROGRESS NOTES
[facility] PRE-ADMISSION TESTING INSTRUCTIONS    The Preadmission Testing patient is instructed accordingly using the following criteria (check applicable):    ARRIVAL INSTRUCTIONS:  [x] Parking the day of Surgery is located in the Main Entrance lot.  Upon entering the door, make an immediate right to the surgery reception desk    [x] Bring photo ID and insurance card    [] Bring in a copy of Living will or Durable Power of  papers.    [x] Please be sure to arrange for responsible adult to provide transportation to and from the hospital    [x] Please arrange for responsible adult to be with you for the 24 hour period post procedure due to having anesthesia    [x] If you awake am of surgery not feeling well or have temperature >100 please call [phone]    GENERAL INSTRUCTIONS:    [x] May have clear liquids until 4 hours prior to surgery. Examples include water, fruit juices (no pulp), jello, popsicles, black coffee or tea, beef or chicken broth.         No gum, candy or mints.    [x] You may brush your teeth, but do not swallow any water    [x] Take medications as instructed with 1-2 oz of water    [] Stop herbal supplements and vitamins 5 days prior to procedure    [] Follow preop dosing of blood thinners per physician instructions    [] Take 1/2 dose of evening insulin, but no insulin after midnight    [] No oral diabetic medications after midnight    [] If diabetic and have low blood sugar or feel symptomatic, take 1-2oz apple juice only    [] Bring inhalers day of surgery    [] Bring C-PAP/ Bi-Pap day of surgery    [] Bring urine specimen day of surgery    [x] Shower or bath with soap, lather and rinse well, AM of Surgery, no lotion, powders or creams to surgical site    [x] Follow bowel prep as instructed per surgeon    [x] No tobacco products within 24 hours of surgery     [x] No alcohol or illegal drug use within 24 hours of surgery.    [x] Jewelry, body piercing's,

## 2024-05-20 ENCOUNTER — HOSPITAL ENCOUNTER (OUTPATIENT)
Age: 74
Setting detail: OUTPATIENT SURGERY
Discharge: HOME | End: 2024-05-20
Attending: INTERNAL MEDICINE | Admitting: INTERNAL MEDICINE
Payer: MEDICARE

## 2024-05-20 ENCOUNTER — PREP FOR PROCEDURE (OUTPATIENT)
Dept: GASTROENTEROLOGY | Age: 74
End: 2024-05-20

## 2024-05-20 ENCOUNTER — APPOINTMENT (OUTPATIENT)
Dept: GENERAL RADIOLOGY | Age: 74
End: 2024-05-20
Attending: INTERNAL MEDICINE
Payer: MEDICARE

## 2024-05-20 ENCOUNTER — ANESTHESIA (OUTPATIENT)
Dept: ENDOSCOPY | Age: 74
End: 2024-05-20
Payer: MEDICARE

## 2024-05-20 ENCOUNTER — ANESTHESIA EVENT (OUTPATIENT)
Dept: ENDOSCOPY | Age: 74
End: 2024-05-20
Payer: MEDICARE

## 2024-05-20 VITALS
BODY MASS INDEX: 19.99 KG/M2 | TEMPERATURE: 97.9 F | HEIGHT: 69 IN | OXYGEN SATURATION: 98 % | RESPIRATION RATE: 18 BRPM | WEIGHT: 135 LBS | HEART RATE: 77 BPM | DIASTOLIC BLOOD PRESSURE: 81 MMHG | SYSTOLIC BLOOD PRESSURE: 147 MMHG

## 2024-05-20 PROCEDURE — 3609008400 HC SIGMOIDOSCOPY DIAGNOSTIC: Performed by: INTERNAL MEDICINE

## 2024-05-20 PROCEDURE — 2709999900 HC NON-CHARGEABLE SUPPLY: Performed by: INTERNAL MEDICINE

## 2024-05-20 PROCEDURE — 7100000011 HC PHASE II RECOVERY - ADDTL 15 MIN: Performed by: INTERNAL MEDICINE

## 2024-05-20 PROCEDURE — 2580000003 HC RX 258: Performed by: INTERNAL MEDICINE

## 2024-05-20 PROCEDURE — 74018 RADEX ABDOMEN 1 VIEW: CPT

## 2024-05-20 PROCEDURE — 3700000000 HC ANESTHESIA ATTENDED CARE: Performed by: INTERNAL MEDICINE

## 2024-05-20 PROCEDURE — 7100000010 HC PHASE II RECOVERY - FIRST 15 MIN: Performed by: INTERNAL MEDICINE

## 2024-05-20 PROCEDURE — 6360000002 HC RX W HCPCS

## 2024-05-20 PROCEDURE — 3700000001 HC ADD 15 MINUTES (ANESTHESIA): Performed by: INTERNAL MEDICINE

## 2024-05-20 RX ORDER — SODIUM CHLORIDE 9 MG/ML
INJECTION, SOLUTION INTRAVENOUS CONTINUOUS
Status: CANCELLED | OUTPATIENT
Start: 2024-05-20

## 2024-05-20 RX ORDER — SODIUM CHLORIDE 9 MG/ML
25 INJECTION, SOLUTION INTRAVENOUS PRN
Status: DISCONTINUED | OUTPATIENT
Start: 2024-05-20 | End: 2024-05-20 | Stop reason: HOSPADM

## 2024-05-20 RX ORDER — SODIUM CHLORIDE 0.9 % (FLUSH) 0.9 %
5-40 SYRINGE (ML) INJECTION EVERY 12 HOURS SCHEDULED
Status: CANCELLED | OUTPATIENT
Start: 2024-05-20

## 2024-05-20 RX ORDER — SODIUM CHLORIDE 9 MG/ML
INJECTION, SOLUTION INTRAVENOUS CONTINUOUS
Status: DISCONTINUED | OUTPATIENT
Start: 2024-05-20 | End: 2024-05-20 | Stop reason: HOSPADM

## 2024-05-20 RX ORDER — PROPOFOL 10 MG/ML
INJECTION, EMULSION INTRAVENOUS PRN
Status: DISCONTINUED | OUTPATIENT
Start: 2024-05-20 | End: 2024-05-20 | Stop reason: SDUPTHER

## 2024-05-20 RX ORDER — GLUCAGON 1 MG/ML
KIT INJECTION PRN
Status: DISCONTINUED | OUTPATIENT
Start: 2024-05-20 | End: 2024-05-20 | Stop reason: SDUPTHER

## 2024-05-20 RX ORDER — SODIUM CHLORIDE 9 MG/ML
25 INJECTION, SOLUTION INTRAVENOUS PRN
Status: CANCELLED | OUTPATIENT
Start: 2024-05-20

## 2024-05-20 RX ORDER — SODIUM CHLORIDE 0.9 % (FLUSH) 0.9 %
5-40 SYRINGE (ML) INJECTION PRN
Status: CANCELLED | OUTPATIENT
Start: 2024-05-20

## 2024-05-20 RX ORDER — SODIUM CHLORIDE 0.9 % (FLUSH) 0.9 %
5-40 SYRINGE (ML) INJECTION PRN
Status: DISCONTINUED | OUTPATIENT
Start: 2024-05-20 | End: 2024-05-20 | Stop reason: HOSPADM

## 2024-05-20 RX ORDER — SODIUM CHLORIDE 0.9 % (FLUSH) 0.9 %
5-40 SYRINGE (ML) INJECTION EVERY 12 HOURS SCHEDULED
Status: DISCONTINUED | OUTPATIENT
Start: 2024-05-20 | End: 2024-05-20 | Stop reason: HOSPADM

## 2024-05-20 RX ADMIN — SODIUM CHLORIDE: 9 INJECTION, SOLUTION INTRAVENOUS at 14:50

## 2024-05-20 RX ADMIN — PROPOFOL 350 MG: 10 INJECTION, EMULSION INTRAVENOUS at 14:56

## 2024-05-20 RX ADMIN — GLUCAGON 0.5 MG: 1 INJECTION, POWDER, LYOPHILIZED, FOR SOLUTION INTRAMUSCULAR; INTRAVENOUS at 15:18

## 2024-05-20 ASSESSMENT — LIFESTYLE VARIABLES: SMOKING_STATUS: 0

## 2024-05-20 ASSESSMENT — PAIN - FUNCTIONAL ASSESSMENT
PAIN_FUNCTIONAL_ASSESSMENT: 0-10
PAIN_FUNCTIONAL_ASSESSMENT: ACTIVITIES ARE NOT PREVENTED
PAIN_FUNCTIONAL_ASSESSMENT: 0-10

## 2024-05-20 NOTE — H&P
Gastroenterology      Pre-operative History and Physical      HISTORY OF PRESENT ILLNESS:     Presents with diarrheal complaints. States he is taking Miralax nightly for constipation. Reports if he don't use the Miralax, he won't go. Denies any urgency with his bowels.  Also denies any black/blood appearance.  Patient instructed to titrate the dosing to target a BM daily. States he has softeners, but has never tried it. Instructed to try the stool softeners nightly, with the Miralax.  EF is low at 25%. Not following any cardiology locally.  10 years ago, had a \"contaminated pacer\", was inpatient for a period of time. States he lost 30#+ at that time, \"can't gain it back\". Has received Cardiology clearance prior to procedure today.     HISTORY:   Past Medical History:   Diagnosis Date    Diarrhea     Hyperlipidemia     Hypertension     PONV (postoperative nausea and vomiting)     Urinary frequency        PERTINENT FAMILY HISTORY:  No family history on file.    MEDICATIONS:    Current Outpatient Medications:     HYDROcodone-acetaminophen (NORCO) 5-325 MG per tablet, Take 1 tablet by mouth every 6 hours as needed for Pain. Max Daily Amount: 4 tablets, Disp: , Rfl:     amLODIPine (NORVASC) 5 MG tablet, Take 1 tablet by mouth daily, Disp: , Rfl:     lisinopril (PRINIVIL;ZESTRIL) 20 MG tablet, Take 1 tablet by mouth daily, Disp: , Rfl:     Ipratropium-Albuterol (COMBIVENT IN), Inhale into the lungs as needed, Disp: , Rfl:     nitroGLYCERIN (NITROSTAT) 0.4 MG SL tablet, Place 1 tablet under the tongue every 5 minutes as needed for Chest pain., Disp: 25 tablet, Rfl: 3    tamsulosin (FLOMAX) 0.4 MG capsule, Take 0.4 mg by mouth daily., Disp: , Rfl:     ALLERGIES:  Sulfa antibiotics    PHYSICAL EXAM/GENERAL APPEARANCE:   Constitutional: Appearance:well-developed, appropriate grooming, in no acute distress. Conversation appropriate.  Skin: Inspection:no rashes, ulcers, icterus or other lesions.  Eyes: Conjunctivae/lids:lids

## 2024-05-20 NOTE — ANESTHESIA PRE PROCEDURE
Department of Anesthesiology  Preprocedure Note       Name:  Mike Morrell Jr.   Age:  73 y.o.  :  1950                                          MRN:  [id number]         Date:  2024      Surgeon: Surgeon(s):  Dayan Zuleta MD    Procedure: Procedure(s):  COLONOSCOPY DIAGNOSTIC    Medications prior to admission:   Prior to Admission medications    Medication Sig Start Date End Date Taking? Authorizing Provider   HYDROcodone-acetaminophen (NORCO) 5-325 MG per tablet Take 1 tablet by mouth every 6 hours as needed for Pain. Max Daily Amount: 4 tablets   Yes Cecelia Villafana MD   amLODIPine (NORVASC) 5 MG tablet Take 1 tablet by mouth daily 21   Cecelia Villafana MD   lisinopril (PRINIVIL;ZESTRIL) 20 MG tablet Take 1 tablet by mouth daily 21   Cecelia Villafana MD   Ipratropium-Albuterol (COMBIVENT IN) Inhale into the lungs as needed    Cecelia Villafana MD   nitroGLYCERIN (NITROSTAT) 0.4 MG SL tablet Place 1 tablet under the tongue every 5 minutes as needed for Chest pain. 14   Dwayne Hawley MD   tamsulosin (FLOMAX) 0.4 MG capsule Take 0.4 mg by mouth daily.    Provider, MD Cecelia       Current medications:    No current facility-administered medications for this encounter.     Current Outpatient Medications   Medication Sig Dispense Refill   • HYDROcodone-acetaminophen (NORCO) 5-325 MG per tablet Take 1 tablet by mouth every 6 hours as needed for Pain. Max Daily Amount: 4 tablets     • amLODIPine (NORVASC) 5 MG tablet Take 1 tablet by mouth daily     • lisinopril (PRINIVIL;ZESTRIL) 20 MG tablet Take 1 tablet by mouth daily     • Ipratropium-Albuterol (COMBIVENT IN) Inhale into the lungs as needed     • nitroGLYCERIN (NITROSTAT) 0.4 MG SL tablet Place 1 tablet under the tongue every 5 minutes as needed for Chest pain. 25 tablet 3   • tamsulosin (FLOMAX) 0.4 MG capsule Take 0.4 mg by mouth daily.         Allergies:    Allergies   Allergen Reactions   • Sulfa

## 2024-05-20 NOTE — ANESTHESIA POSTPROCEDURE EVALUATION
Department of Anesthesiology  Postprocedure Note    Patient: Mike Morrell Jr.  MRN: [id number]  YOB: 1950  Date of evaluation: 5/20/2024    Procedure Summary       Date: 05/20/24 Room / Location: [address]    Anesthesia Start: 1454 Anesthesia Stop: 1530    Procedure: SIGMOIDOSCOPY DIAGNOSTIC FLEXIBLE Diagnosis:       Diarrhea, unspecified type      Constipation, unspecified constipation type      (Diarrhea, unspecified type [R19.7])      (Constipation, unspecified constipation type [K59.00])    Surgeons: Dayan Zuleta MD Responsible Provider: Elisabet Sawyer DO    Anesthesia Type: MAC ASA Status: 2            Anesthesia Type: No value filed.    Sneha Phase I: Sneha Score: 10    Sneha Phase II:      Anesthesia Post Evaluation    Patient location during evaluation: PACU  Patient participation: complete - patient participated  Level of consciousness: awake and alert  Pain score: 2  Airway patency: patent  Nausea & Vomiting: no nausea and no vomiting  Cardiovascular status: blood pressure returned to baseline  Respiratory status: acceptable  Hydration status: euvolemic  Pain management: adequate and satisfactory to patient        No notable events documented.

## 2024-05-20 NOTE — PROGRESS NOTES
Abdomen soft active non tender not distended still passing gas frequently KUB completed by xray  Dr Zuleta notified ok to discharge to home

## 2024-05-20 NOTE — H&P
Gastroenterology      Pre-operative History and Physical      HISTORY OF PRESENT ILLNESS:     Presents with diarrheal complaints. States he is taking Miralax nightly for constipation. Reports if he don't use the Miralax, he won't go. Denies any urgency with his bowels.  Also denies any black/blood appearance.  Patient instructed to titrate the dosing to target a BM daily. States he has softeners, but has never tried it. Instructed to try the stool softeners nightly, with the Miralax.  EF is low at 25%. Not following any cardiology locally.  10 years ago, had a \"contaminated pacer\", was inpatient for a period of time. States he lost 30#+ at that time, \"can't gain it back\". Has received Cardiology clearance prior to procedure today.     HISTORY:   Past Medical History:   Diagnosis Date    Diarrhea     Hyperlipidemia     Hypertension     PONV (postoperative nausea and vomiting)     Urinary frequency        PERTINENT FAMILY HISTORY:  History reviewed. No pertinent family history.    MEDICATIONS:  No current outpatient medications on file.    ALLERGIES:  Sulfa antibiotics    PHYSICAL EXAM/GENERAL APPEARANCE:   Constitutional: Appearance:well-developed, appropriate grooming, in no acute distress. Conversation appropriate.  Skin: Inspection:no rashes, ulcers, icterus or other lesions.  Eyes: Conjunctivae/lids:lids normal,anicteric sclera, moist conjunctivae. Pupils/irises:   ENMT: normal external inspection of the nose and ears, atraumautic. Hearing:within normal limits. Oropharynx:normal tongue, hard and soft palate; posterior pharynx without erythema, exudates or lesions.  Neck: normal motion and central trachea. Thyroid:normal size, consistency and position; no masses or tenderness.  Respiratory: :normal respiratory effort and no intercostal retractions.Auscultation:normal breath sounds, no rubs, wheezes or rhonchi.  Chest: symmetrical without visualized masses.  Cardiovascular: normal rhythm. Normal, S1 and S2,no

## 2024-05-21 NOTE — OP NOTE
[place] [address]                            OPERATIVE REPORT      PATIENT NAME: EDMUND BERRY               : 1950  MED REC NO: [id number]                        ROOM: [facility] ROOM  ACCOUNT NO: [id number]                       ADMIT DATE: 2024  PROVIDER: Celestine Ward MD      DATE OF PROCEDURE:      SURGEON:  Celestine Ward MD    PROCEDURE:  Incomplete colonoscopy.    PREOPERATIVE DIAGNOSIS:  Change in bowel habits, mild abdominal pain.    ESTIMATED BLOOD LOSS:  N/A    POSTOPERATIVE DIAGNOSIS:  Severe diverticulosis with fixation and angulation in the left colon in view of the diffuse nature and severity of his diverticular disease and angulation and fixation.    DESCRIPTION OF PROCEDURE:  Completion to the cecum was deemed unsafe, so the procedure was aborted at the descending colon and was terminated.  The patient will undergo a followup evaluation as an outpatient and will be followed accordingly.          CELESTINE WARD MD      D:  2024 15:35:31     T:  2024 21:38:20     STEVE/STANFORD  Job #:  [id number]     Doc#:  [id number]    CC:   MD Eric Lemus MD

## 2024-10-03 ENCOUNTER — TELEPHONE (OUTPATIENT)
Dept: ADMINISTRATIVE | Age: 74
End: 2024-10-03

## 2024-10-03 NOTE — TELEPHONE ENCOUNTER
The cardio referral entered is not for this patient.  Different PCP and he has cardiologist at [facility].    No media scanned.

## 2024-11-26 ENCOUNTER — HOSPITAL ENCOUNTER (INPATIENT)
Age: 74
LOS: 9 days | Discharge: NURSING, REHAB OR LONG-TERM CARE | DRG: 521 | End: 2024-12-05
Attending: STUDENT IN AN ORGANIZED HEALTH CARE EDUCATION/TRAINING PROGRAM | Admitting: INTERNAL MEDICINE
Payer: MEDICARE

## 2024-11-26 ENCOUNTER — APPOINTMENT (OUTPATIENT)
Dept: GENERAL RADIOLOGY | Age: 74
DRG: 521 | End: 2024-11-26
Payer: MEDICARE

## 2024-11-26 ENCOUNTER — APPOINTMENT (OUTPATIENT)
Dept: CT IMAGING | Age: 74
DRG: 521 | End: 2024-11-26
Payer: MEDICARE

## 2024-11-26 DIAGNOSIS — E87.1 HYPONATREMIA: Primary | ICD-10-CM

## 2024-11-26 DIAGNOSIS — S72.092A OTHER CLOSED FRACTURE OF HEAD OR NECK OF LEFT FEMUR, INITIAL ENCOUNTER (HCC): ICD-10-CM

## 2024-11-26 DIAGNOSIS — I47.29 NSVT (NONSUSTAINED VENTRICULAR TACHYCARDIA) (HCC): ICD-10-CM

## 2024-11-26 DIAGNOSIS — R91.8 LUNG MASS: ICD-10-CM

## 2024-11-26 DIAGNOSIS — S72.002A CLOSED FRACTURE OF NECK OF LEFT FEMUR, INITIAL ENCOUNTER (HCC): ICD-10-CM

## 2024-11-26 PROBLEM — S72.8X2A OTHER FRACTURE OF LEFT FEMUR, INITIAL ENCOUNTER FOR CLOSED FRACTURE (HCC): Status: ACTIVE | Noted: 2024-11-26

## 2024-11-26 PROBLEM — F10.20 ALCOHOL DEPENDENCE (HCC): Status: ACTIVE | Noted: 2024-11-26

## 2024-11-26 LAB
ABO + RH BLD: NORMAL
ALBUMIN SERPL-MCNC: 4.1 G/DL (ref 3.5–5.2)
ALP SERPL-CCNC: 92 U/L (ref 40–129)
ALT SERPL-CCNC: 25 U/L (ref 0–40)
ANION GAP SERPL CALCULATED.3IONS-SCNC: 10 MMOL/L (ref 7–16)
ANION GAP SERPL CALCULATED.3IONS-SCNC: 12 MMOL/L (ref 7–16)
ANION GAP SERPL CALCULATED.3IONS-SCNC: 13 MMOL/L (ref 7–16)
ANION GAP SERPL CALCULATED.3IONS-SCNC: 13 MMOL/L (ref 7–16)
ANION GAP SERPL CALCULATED.3IONS-SCNC: 14 MMOL/L (ref 7–16)
ARM BAND NUMBER: NORMAL
AST SERPL-CCNC: 47 U/L (ref 0–39)
BASOPHILS # BLD: 0 K/UL (ref 0–0.2)
BASOPHILS NFR BLD: 0 % (ref 0–2)
BILIRUB SERPL-MCNC: 0.5 MG/DL (ref 0–1.2)
BILIRUB UR QL STRIP: NEGATIVE
BLOOD BANK SAMPLE EXPIRATION: NORMAL
BLOOD GROUP ANTIBODIES SERPL: NEGATIVE
BUN SERPL-MCNC: 12 MG/DL (ref 6–23)
BUN SERPL-MCNC: 12 MG/DL (ref 6–23)
BUN SERPL-MCNC: 13 MG/DL (ref 6–23)
BUN SERPL-MCNC: 13 MG/DL (ref 6–23)
BUN SERPL-MCNC: 14 MG/DL (ref 6–23)
CALCIUM SERPL-MCNC: 8.7 MG/DL (ref 8.6–10.2)
CALCIUM SERPL-MCNC: 8.7 MG/DL (ref 8.6–10.2)
CALCIUM SERPL-MCNC: 9 MG/DL (ref 8.6–10.2)
CALCIUM SERPL-MCNC: 9 MG/DL (ref 8.6–10.2)
CALCIUM SERPL-MCNC: 9.2 MG/DL (ref 8.6–10.2)
CHLORIDE SERPL-SCNC: 82 MMOL/L (ref 98–107)
CHLORIDE SERPL-SCNC: 82 MMOL/L (ref 98–107)
CHLORIDE SERPL-SCNC: 85 MMOL/L (ref 98–107)
CHLORIDE SERPL-SCNC: 85 MMOL/L (ref 98–107)
CHLORIDE SERPL-SCNC: 90 MMOL/L (ref 98–107)
CLARITY UR: CLEAR
CO2 SERPL-SCNC: 21 MMOL/L (ref 22–29)
CO2 SERPL-SCNC: 22 MMOL/L (ref 22–29)
CO2 SERPL-SCNC: 23 MMOL/L (ref 22–29)
CO2 SERPL-SCNC: 23 MMOL/L (ref 22–29)
CO2 SERPL-SCNC: 24 MMOL/L (ref 22–29)
COLOR UR: YELLOW
CORTIS SERPL-MCNC: 23.6 UG/DL (ref 2.7–18.4)
CORTISOL COLLECTION INFO: ABNORMAL
CREAT SERPL-MCNC: 0.9 MG/DL (ref 0.7–1.2)
CREAT SERPL-MCNC: 1 MG/DL (ref 0.7–1.2)
CREAT SERPL-MCNC: 1.1 MG/DL (ref 0.7–1.2)
EKG ATRIAL RATE: 78 BPM
EKG P AXIS: 89 DEGREES
EKG P-R INTERVAL: 162 MS
EKG Q-T INTERVAL: 392 MS
EKG QRS DURATION: 94 MS
EKG QTC CALCULATION (BAZETT): 446 MS
EKG R AXIS: 83 DEGREES
EKG T AXIS: 31 DEGREES
EKG VENTRICULAR RATE: 78 BPM
EOSINOPHIL # BLD: 0 K/UL (ref 0.05–0.5)
EOSINOPHILS RELATIVE PERCENT: 0 % (ref 0–6)
ERYTHROCYTE [DISTWIDTH] IN BLOOD BY AUTOMATED COUNT: 12.1 % (ref 11.5–15)
GFR, ESTIMATED: 72 ML/MIN/1.73M2
GFR, ESTIMATED: 81 ML/MIN/1.73M2
GFR, ESTIMATED: 87 ML/MIN/1.73M2
GFR, ESTIMATED: 90 ML/MIN/1.73M2
GFR, ESTIMATED: >90 ML/MIN/1.73M2
GLUCOSE SERPL-MCNC: 118 MG/DL (ref 74–99)
GLUCOSE SERPL-MCNC: 120 MG/DL (ref 74–99)
GLUCOSE SERPL-MCNC: 126 MG/DL (ref 74–99)
GLUCOSE SERPL-MCNC: 129 MG/DL (ref 74–99)
GLUCOSE SERPL-MCNC: 130 MG/DL (ref 74–99)
GLUCOSE UR STRIP-MCNC: NEGATIVE MG/DL
HCT VFR BLD AUTO: 33 % (ref 37–54)
HGB BLD-MCNC: 12 G/DL (ref 12.5–16.5)
HGB UR QL STRIP.AUTO: NEGATIVE
KETONES UR STRIP-MCNC: ABNORMAL MG/DL
LEUKOCYTE ESTERASE UR QL STRIP: NEGATIVE
LYMPHOCYTES NFR BLD: 0.31 K/UL (ref 1.5–4)
LYMPHOCYTES RELATIVE PERCENT: 5 % (ref 20–42)
MCH RBC QN AUTO: 38.3 PG (ref 26–35)
MCHC RBC AUTO-ENTMCNC: 36.4 G/DL (ref 32–34.5)
MCV RBC AUTO: 105.4 FL (ref 80–99.9)
MONOCYTES NFR BLD: 0.31 K/UL (ref 0.1–0.95)
MONOCYTES NFR BLD: 5 % (ref 2–12)
NEUTROPHILS NFR BLD: 90 % (ref 43–80)
NEUTS SEG NFR BLD: 5.37 K/UL (ref 1.8–7.3)
NITRITE UR QL STRIP: NEGATIVE
OSMOLALITY SERPL: 262 MOSM/KG (ref 285–310)
OSMOLALITY UR: 241 MOSM/KG (ref 300–900)
PH UR STRIP: 5.5 [PH] (ref 5–9)
PLATELET # BLD AUTO: 267 K/UL (ref 130–450)
PMV BLD AUTO: 9 FL (ref 7–12)
POTASSIUM SERPL-SCNC: 5.1 MMOL/L (ref 3.5–5)
POTASSIUM SERPL-SCNC: 5.3 MMOL/L (ref 3.5–5)
POTASSIUM SERPL-SCNC: 5.3 MMOL/L (ref 3.5–5)
POTASSIUM SERPL-SCNC: 5.6 MMOL/L (ref 3.5–5)
POTASSIUM SERPL-SCNC: 5.8 MMOL/L (ref 3.5–5)
POTASSIUM, UR: 32.5 MMOL/L
PROT SERPL-MCNC: 6.5 G/DL (ref 6.4–8.3)
PROT UR STRIP-MCNC: NEGATIVE MG/DL
RBC # BLD AUTO: 3.13 M/UL (ref 3.8–5.8)
RBC # BLD: ABNORMAL 10*6/UL
RBC #/AREA URNS HPF: ABNORMAL /HPF
SODIUM SERPL-SCNC: 117 MMOL/L (ref 132–146)
SODIUM SERPL-SCNC: 118 MMOL/L (ref 132–146)
SODIUM SERPL-SCNC: 121 MMOL/L (ref 132–146)
SODIUM SERPL-SCNC: 121 MMOL/L (ref 132–146)
SODIUM SERPL-SCNC: 122 MMOL/L (ref 132–146)
SODIUM UR-SCNC: 30 MMOL/L
SP GR UR STRIP: 1.01 (ref 1–1.03)
UROBILINOGEN UR STRIP-ACNC: 0.2 EU/DL (ref 0–1)
WBC #/AREA URNS HPF: ABNORMAL /HPF
WBC OTHER # BLD: 6 K/UL (ref 4.5–11.5)

## 2024-11-26 PROCEDURE — 6360000004 HC RX CONTRAST MEDICATION: Performed by: RADIOLOGY

## 2024-11-26 PROCEDURE — 2580000003 HC RX 258: Performed by: INTERNAL MEDICINE

## 2024-11-26 PROCEDURE — 85025 COMPLETE CBC W/AUTO DIFF WBC: CPT

## 2024-11-26 PROCEDURE — 86850 RBC ANTIBODY SCREEN: CPT

## 2024-11-26 PROCEDURE — 71260 CT THORAX DX C+: CPT

## 2024-11-26 PROCEDURE — 86901 BLOOD TYPING SEROLOGIC RH(D): CPT

## 2024-11-26 PROCEDURE — 84300 ASSAY OF URINE SODIUM: CPT

## 2024-11-26 PROCEDURE — 2060000000 HC ICU INTERMEDIATE R&B

## 2024-11-26 PROCEDURE — 80053 COMPREHEN METABOLIC PANEL: CPT

## 2024-11-26 PROCEDURE — 6360000002 HC RX W HCPCS: Performed by: HOSPITALIST

## 2024-11-26 PROCEDURE — 80048 BASIC METABOLIC PNL TOTAL CA: CPT

## 2024-11-26 PROCEDURE — 82533 TOTAL CORTISOL: CPT

## 2024-11-26 PROCEDURE — 73502 X-RAY EXAM HIP UNI 2-3 VIEWS: CPT

## 2024-11-26 PROCEDURE — 83935 ASSAY OF URINE OSMOLALITY: CPT

## 2024-11-26 PROCEDURE — 84133 ASSAY OF URINE POTASSIUM: CPT

## 2024-11-26 PROCEDURE — 6370000000 HC RX 637 (ALT 250 FOR IP): Performed by: HOSPITALIST

## 2024-11-26 PROCEDURE — 2580000003 HC RX 258: Performed by: HOSPITALIST

## 2024-11-26 PROCEDURE — 99285 EMERGENCY DEPT VISIT HI MDM: CPT

## 2024-11-26 PROCEDURE — 96375 TX/PRO/DX INJ NEW DRUG ADDON: CPT

## 2024-11-26 PROCEDURE — 93010 ELECTROCARDIOGRAM REPORT: CPT | Performed by: INTERNAL MEDICINE

## 2024-11-26 PROCEDURE — 6360000002 HC RX W HCPCS: Performed by: STUDENT IN AN ORGANIZED HEALTH CARE EDUCATION/TRAINING PROGRAM

## 2024-11-26 PROCEDURE — 83930 ASSAY OF BLOOD OSMOLALITY: CPT

## 2024-11-26 PROCEDURE — 96374 THER/PROPH/DIAG INJ IV PUSH: CPT

## 2024-11-26 PROCEDURE — 86900 BLOOD TYPING SEROLOGIC ABO: CPT

## 2024-11-26 PROCEDURE — 71045 X-RAY EXAM CHEST 1 VIEW: CPT

## 2024-11-26 PROCEDURE — 81001 URINALYSIS AUTO W/SCOPE: CPT

## 2024-11-26 PROCEDURE — 6370000000 HC RX 637 (ALT 250 FOR IP): Performed by: STUDENT IN AN ORGANIZED HEALTH CARE EDUCATION/TRAINING PROGRAM

## 2024-11-26 PROCEDURE — 93005 ELECTROCARDIOGRAM TRACING: CPT | Performed by: STUDENT IN AN ORGANIZED HEALTH CARE EDUCATION/TRAINING PROGRAM

## 2024-11-26 RX ORDER — MORPHINE SULFATE 4 MG/ML
4 INJECTION, SOLUTION INTRAMUSCULAR; INTRAVENOUS
Status: DISCONTINUED | OUTPATIENT
Start: 2024-11-26 | End: 2024-11-30 | Stop reason: SDUPTHER

## 2024-11-26 RX ORDER — LORAZEPAM 1 MG/1
4 TABLET ORAL
Status: DISCONTINUED | OUTPATIENT
Start: 2024-11-26 | End: 2024-12-05 | Stop reason: HOSPADM

## 2024-11-26 RX ORDER — AMLODIPINE BESYLATE 5 MG/1
5 TABLET ORAL DAILY
Status: DISCONTINUED | OUTPATIENT
Start: 2024-11-26 | End: 2024-12-05 | Stop reason: HOSPADM

## 2024-11-26 RX ORDER — 3% SODIUM CHLORIDE 3 G/100ML
25 INJECTION, SOLUTION INTRAVENOUS CONTINUOUS
Status: DISCONTINUED | OUTPATIENT
Start: 2024-11-26 | End: 2024-11-27

## 2024-11-26 RX ORDER — ENOXAPARIN SODIUM 100 MG/ML
40 INJECTION SUBCUTANEOUS DAILY
Status: DISCONTINUED | OUTPATIENT
Start: 2024-11-26 | End: 2024-11-30 | Stop reason: SDUPTHER

## 2024-11-26 RX ORDER — CARISOPRODOL 350 MG/1
350 TABLET ORAL 4 TIMES DAILY PRN
COMMUNITY

## 2024-11-26 RX ORDER — ACETAMINOPHEN 650 MG/1
650 SUPPOSITORY RECTAL EVERY 6 HOURS PRN
Status: DISCONTINUED | OUTPATIENT
Start: 2024-11-26 | End: 2024-12-05 | Stop reason: HOSPADM

## 2024-11-26 RX ORDER — NITROGLYCERIN 0.4 MG/1
0.4 TABLET SUBLINGUAL EVERY 5 MIN PRN
Status: DISCONTINUED | OUTPATIENT
Start: 2024-11-26 | End: 2024-12-05 | Stop reason: HOSPADM

## 2024-11-26 RX ORDER — ACETAMINOPHEN 325 MG/1
650 TABLET ORAL EVERY 6 HOURS PRN
Status: DISCONTINUED | OUTPATIENT
Start: 2024-11-26 | End: 2024-12-05 | Stop reason: HOSPADM

## 2024-11-26 RX ORDER — LORAZEPAM 2 MG/ML
4 INJECTION INTRAMUSCULAR
Status: DISCONTINUED | OUTPATIENT
Start: 2024-11-26 | End: 2024-11-26 | Stop reason: RX

## 2024-11-26 RX ORDER — LORAZEPAM 1 MG/1
3 TABLET ORAL
Status: DISCONTINUED | OUTPATIENT
Start: 2024-11-26 | End: 2024-12-05 | Stop reason: HOSPADM

## 2024-11-26 RX ORDER — LANOLIN ALCOHOL/MO/W.PET/CERES
100 CREAM (GRAM) TOPICAL DAILY
Status: DISCONTINUED | OUTPATIENT
Start: 2024-11-26 | End: 2024-12-05 | Stop reason: HOSPADM

## 2024-11-26 RX ORDER — MORPHINE SULFATE 4 MG/ML
4 INJECTION, SOLUTION INTRAMUSCULAR; INTRAVENOUS ONCE
Status: COMPLETED | OUTPATIENT
Start: 2024-11-26 | End: 2024-11-26

## 2024-11-26 RX ORDER — LORAZEPAM 2 MG/ML
2 INJECTION INTRAMUSCULAR
Status: DISCONTINUED | OUTPATIENT
Start: 2024-11-26 | End: 2024-11-26 | Stop reason: RX

## 2024-11-26 RX ORDER — LORAZEPAM 1 MG/1
1 TABLET ORAL
Status: DISCONTINUED | OUTPATIENT
Start: 2024-11-26 | End: 2024-12-05 | Stop reason: HOSPADM

## 2024-11-26 RX ORDER — DIAZEPAM 2 MG/1
2 TABLET ORAL EVERY 12 HOURS PRN
Status: DISCONTINUED | OUTPATIENT
Start: 2024-11-26 | End: 2024-12-01

## 2024-11-26 RX ORDER — HYDROCODONE BITARTRATE AND ACETAMINOPHEN 5; 325 MG/1; MG/1
1 TABLET ORAL EVERY 6 HOURS PRN
Status: DISCONTINUED | OUTPATIENT
Start: 2024-11-26 | End: 2024-11-28

## 2024-11-26 RX ORDER — LISINOPRIL 20 MG/1
20 TABLET ORAL DAILY
Status: DISCONTINUED | OUTPATIENT
Start: 2024-11-26 | End: 2024-12-05

## 2024-11-26 RX ORDER — 3% SODIUM CHLORIDE 3 G/100ML
25 INJECTION, SOLUTION INTRAVENOUS CONTINUOUS
Status: DISCONTINUED | OUTPATIENT
Start: 2024-11-26 | End: 2024-11-26

## 2024-11-26 RX ORDER — TAMSULOSIN HYDROCHLORIDE 0.4 MG/1
0.4 CAPSULE ORAL DAILY
Status: DISCONTINUED | OUTPATIENT
Start: 2024-11-26 | End: 2024-12-05 | Stop reason: HOSPADM

## 2024-11-26 RX ORDER — SODIUM CHLORIDE 0.9 % (FLUSH) 0.9 %
5-40 SYRINGE (ML) INJECTION EVERY 12 HOURS SCHEDULED
Status: DISCONTINUED | OUTPATIENT
Start: 2024-11-26 | End: 2024-11-30 | Stop reason: SDUPTHER

## 2024-11-26 RX ORDER — SODIUM CHLORIDE 0.9 % (FLUSH) 0.9 %
5-40 SYRINGE (ML) INJECTION PRN
Status: DISCONTINUED | OUTPATIENT
Start: 2024-11-26 | End: 2024-11-30 | Stop reason: SDUPTHER

## 2024-11-26 RX ORDER — IOPAMIDOL 755 MG/ML
75 INJECTION, SOLUTION INTRAVASCULAR
Status: COMPLETED | OUTPATIENT
Start: 2024-11-26 | End: 2024-11-26

## 2024-11-26 RX ORDER — SODIUM CHLORIDE 9 MG/ML
INJECTION, SOLUTION INTRAVENOUS PRN
Status: DISCONTINUED | OUTPATIENT
Start: 2024-11-26 | End: 2024-11-30 | Stop reason: SDUPTHER

## 2024-11-26 RX ORDER — SODIUM CHLORIDE, SODIUM LACTATE, POTASSIUM CHLORIDE, CALCIUM CHLORIDE 600; 310; 30; 20 MG/100ML; MG/100ML; MG/100ML; MG/100ML
INJECTION, SOLUTION INTRAVENOUS CONTINUOUS
Status: DISCONTINUED | OUTPATIENT
Start: 2024-11-26 | End: 2024-11-26

## 2024-11-26 RX ORDER — SODIUM CHLORIDE 0.9 % (FLUSH) 0.9 %
5-40 SYRINGE (ML) INJECTION EVERY 12 HOURS SCHEDULED
Status: DISCONTINUED | OUTPATIENT
Start: 2024-11-26 | End: 2024-11-26 | Stop reason: SDUPTHER

## 2024-11-26 RX ORDER — POLYETHYLENE GLYCOL 3350 17 G/17G
17 POWDER, FOR SOLUTION ORAL DAILY PRN
Status: DISCONTINUED | OUTPATIENT
Start: 2024-11-26 | End: 2024-12-05 | Stop reason: HOSPADM

## 2024-11-26 RX ORDER — SODIUM CHLORIDE 9 MG/ML
INJECTION, SOLUTION INTRAVENOUS PRN
Status: DISCONTINUED | OUTPATIENT
Start: 2024-11-26 | End: 2024-11-26 | Stop reason: SDUPTHER

## 2024-11-26 RX ORDER — DIAZEPAM 5 MG/1
5 TABLET ORAL EVERY 8 HOURS
Status: ON HOLD | COMMUNITY
Start: 2024-05-23 | End: 2024-12-05 | Stop reason: HOSPADM

## 2024-11-26 RX ORDER — MAGNESIUM SULFATE IN WATER 40 MG/ML
2000 INJECTION, SOLUTION INTRAVENOUS PRN
Status: DISCONTINUED | OUTPATIENT
Start: 2024-11-26 | End: 2024-12-05 | Stop reason: HOSPADM

## 2024-11-26 RX ORDER — LORAZEPAM 1 MG/1
2 TABLET ORAL
Status: DISCONTINUED | OUTPATIENT
Start: 2024-11-26 | End: 2024-12-05 | Stop reason: HOSPADM

## 2024-11-26 RX ORDER — POTASSIUM CHLORIDE 7.45 MG/ML
10 INJECTION INTRAVENOUS PRN
Status: DISCONTINUED | OUTPATIENT
Start: 2024-11-26 | End: 2024-11-26 | Stop reason: RX

## 2024-11-26 RX ORDER — ONDANSETRON 2 MG/ML
4 INJECTION INTRAMUSCULAR; INTRAVENOUS EVERY 6 HOURS PRN
Status: DISCONTINUED | OUTPATIENT
Start: 2024-11-26 | End: 2024-11-30 | Stop reason: SDUPTHER

## 2024-11-26 RX ORDER — FENTANYL CITRATE 50 UG/ML
50 INJECTION, SOLUTION INTRAMUSCULAR; INTRAVENOUS ONCE
Status: COMPLETED | OUTPATIENT
Start: 2024-11-26 | End: 2024-11-26

## 2024-11-26 RX ORDER — SODIUM CHLORIDE 0.9 % (FLUSH) 0.9 %
5-40 SYRINGE (ML) INJECTION PRN
Status: DISCONTINUED | OUTPATIENT
Start: 2024-11-26 | End: 2024-11-26 | Stop reason: SDUPTHER

## 2024-11-26 RX ORDER — LORAZEPAM 2 MG/ML
1 INJECTION INTRAMUSCULAR
Status: DISCONTINUED | OUTPATIENT
Start: 2024-11-26 | End: 2024-11-26 | Stop reason: RX

## 2024-11-26 RX ORDER — ONDANSETRON 4 MG/1
4 TABLET, ORALLY DISINTEGRATING ORAL EVERY 8 HOURS PRN
Status: DISCONTINUED | OUTPATIENT
Start: 2024-11-26 | End: 2024-11-30 | Stop reason: SDUPTHER

## 2024-11-26 RX ORDER — SODIUM CHLORIDE 9 MG/ML
INJECTION, SOLUTION INTRAVENOUS CONTINUOUS
Status: CANCELLED | OUTPATIENT
Start: 2024-11-27

## 2024-11-26 RX ORDER — POTASSIUM CHLORIDE 1500 MG/1
40 TABLET, EXTENDED RELEASE ORAL PRN
Status: DISCONTINUED | OUTPATIENT
Start: 2024-11-26 | End: 2024-12-05 | Stop reason: HOSPADM

## 2024-11-26 RX ORDER — LORAZEPAM 2 MG/ML
3 INJECTION INTRAMUSCULAR
Status: DISCONTINUED | OUTPATIENT
Start: 2024-11-26 | End: 2024-11-26 | Stop reason: RX

## 2024-11-26 RX ADMIN — MORPHINE SULFATE 4 MG: 4 INJECTION, SOLUTION INTRAMUSCULAR; INTRAVENOUS at 22:35

## 2024-11-26 RX ADMIN — Medication 100 MG: at 14:00

## 2024-11-26 RX ADMIN — DIAZEPAM 2 MG: 2 TABLET ORAL at 14:33

## 2024-11-26 RX ADMIN — HYDROCODONE BITARTRATE AND ACETAMINOPHEN 1 TABLET: 5; 325 TABLET ORAL at 21:17

## 2024-11-26 RX ADMIN — TIZANIDINE 2 MG: 4 TABLET ORAL at 21:17

## 2024-11-26 RX ADMIN — SODIUM CHLORIDE, PRESERVATIVE FREE 10 ML: 5 INJECTION INTRAVENOUS at 21:17

## 2024-11-26 RX ADMIN — MORPHINE SULFATE 4 MG: 4 INJECTION, SOLUTION INTRAMUSCULAR; INTRAVENOUS at 14:30

## 2024-11-26 RX ADMIN — IOPAMIDOL 75 ML: 755 INJECTION, SOLUTION INTRAVENOUS at 10:17

## 2024-11-26 RX ADMIN — FENTANYL CITRATE 50 MCG: 50 INJECTION INTRAMUSCULAR; INTRAVENOUS at 08:09

## 2024-11-26 RX ADMIN — MORPHINE SULFATE 4 MG: 4 INJECTION, SOLUTION INTRAMUSCULAR; INTRAVENOUS at 09:52

## 2024-11-26 RX ADMIN — SODIUM CHLORIDE 25 ML/HR: 3 INJECTION, SOLUTION INTRAVENOUS at 22:42

## 2024-11-26 RX ADMIN — SODIUM CHLORIDE, PRESERVATIVE FREE 10 ML: 5 INJECTION INTRAVENOUS at 22:35

## 2024-11-26 RX ADMIN — ONDANSETRON 4 MG: 4 TABLET, ORALLY DISINTEGRATING ORAL at 14:32

## 2024-11-26 ASSESSMENT — PAIN - FUNCTIONAL ASSESSMENT
PAIN_FUNCTIONAL_ASSESSMENT: PREVENTS OR INTERFERES SOME ACTIVE ACTIVITIES AND ADLS
PAIN_FUNCTIONAL_ASSESSMENT: PREVENTS OR INTERFERES SOME ACTIVE ACTIVITIES AND ADLS
PAIN_FUNCTIONAL_ASSESSMENT: PREVENTS OR INTERFERES WITH ALL ACTIVE AND SOME PASSIVE ACTIVITIES
PAIN_FUNCTIONAL_ASSESSMENT: PREVENTS OR INTERFERES WITH ALL ACTIVE AND SOME PASSIVE ACTIVITIES

## 2024-11-26 ASSESSMENT — LIFESTYLE VARIABLES
HOW MANY STANDARD DRINKS CONTAINING ALCOHOL DO YOU HAVE ON A TYPICAL DAY: 1 OR 2
HOW OFTEN DO YOU HAVE A DRINK CONTAINING ALCOHOL: MONTHLY OR LESS

## 2024-11-26 ASSESSMENT — PAIN DESCRIPTION - ORIENTATION
ORIENTATION: LEFT

## 2024-11-26 ASSESSMENT — PAIN DESCRIPTION - FREQUENCY
FREQUENCY: CONTINUOUS
FREQUENCY: CONTINUOUS

## 2024-11-26 ASSESSMENT — PAIN DESCRIPTION - DESCRIPTORS
DESCRIPTORS: ACHING;DISCOMFORT;CRAMPING
DESCRIPTORS: ACHING;BURNING;CRAMPING
DESCRIPTORS: ACHING;DISCOMFORT;CRAMPING

## 2024-11-26 ASSESSMENT — PAIN SCALES - GENERAL
PAINLEVEL_OUTOF10: 10
PAINLEVEL_OUTOF10: 9
PAINLEVEL_OUTOF10: 10
PAINLEVEL_OUTOF10: 10
PAINLEVEL_OUTOF10: 8

## 2024-11-26 ASSESSMENT — PAIN DESCRIPTION - ONSET
ONSET: ON-GOING
ONSET: ON-GOING

## 2024-11-26 ASSESSMENT — PAIN DESCRIPTION - LOCATION
LOCATION: HIP

## 2024-11-26 ASSESSMENT — PAIN DESCRIPTION - PAIN TYPE
TYPE: ACUTE PAIN
TYPE: ACUTE PAIN

## 2024-11-26 NOTE — ED PROVIDER NOTES
Department of Emergency Medicine   ED  Provider Note  Admit Date/RoomTime: 11/26/2024  6:01 AM  ED Room: 24/24              [facility]     Mike Morrell Jr. is a 74 y.o. male with a PMHx significant for  HTN, HLD  who presents for evaluation of left hip pain, beginning prior to arrival.  The complaint has been persistent, moderate in severity, and worsened by movement.   The patient states that yesterday evening around 2000 he was ambulating, notes he has a wound on his left heel.  States that he mechanical fall landing on his left hip.  He was unable to get back up.  Dragged himself on the floor to get his phone his daughter helped him up to a chair.  He has been unable to sleep secondary to pain.  Denies hitting his head, no LOC, no anticoagulation.  Denies any notes numbness or tingling.     Review of Systems   Constitutional:  Negative for chills and fever.   HENT:  Negative for ear pain, sinus pressure and sore throat.    Eyes:  Negative for pain, discharge and redness.   Respiratory:  Negative for cough, shortness of breath and wheezing.    Cardiovascular:  Negative for chest pain.   Gastrointestinal:  Negative for abdominal pain, diarrhea, nausea and vomiting.   Genitourinary:  Negative for dysuria and frequency.   Musculoskeletal:  Negative for arthralgias and back pain.        Left hip pain   Skin:  Negative for rash and wound.   Neurological:  Negative for weakness and headaches.   Hematological:  Negative for adenopathy.   All other systems reviewed and are negative.       Physical Exam  Vitals and nursing note reviewed.   Constitutional:       General: He is not in acute distress.     Appearance: Normal appearance. He is well-developed. He is not ill-appearing.   HENT:      Head: Normocephalic and atraumatic.      Right Ear: External ear normal.      Left Ear: External ear normal.   Eyes:      General:         Right eye: No discharge.         Left eye: No discharge.      Extraocular Movements: Extraocular 
Partially impaired: cannot see medication labels or newsprint, but can see obstacles in path, and the surrounding layout; can count fingers at arm's length

## 2024-11-26 NOTE — ED NOTES
ED to Inpatient Handoff Report    Notified Floor  that electronic handoff available and patient ready for transport to room 4    Safety Risks: None identified    Patient in Restraints: no    Constant Observer or Patient : no    Telemetry Monitoring Ordered: No                  Deterioration Index: 26.31    Vitals:    11/26/24 1400 11/26/24 1515 11/26/24 1600 11/26/24 1728   BP: 124/71  115/84 122/76   Pulse: 86 86 83 91   Resp: 20 18  18   Temp: 98 °F (36.7 °C)      TempSrc: Oral      SpO2: 97%   96%   Weight:       Height:           Opportunity for questions and clarification was provided.

## 2024-11-26 NOTE — H&P
Internal Medicine History & Physical     Name: Mike Morrell Jr.  : 1950  Chief Complaint: Leg Injury (Brought to ED by EMS for a fall. Left leg shorter than right)  Primary Care Physician: Eric Jackson MD  Admission date: 2024  Date of service: 2024     History of Present Illness  Mike is a 74 y.o. year old male.  Patient came in after falling in his kitchen.  Patient states that he was standing there, scraping food off his plate and then fell to the ground.  States he does not remember losing consciousness, tripping, feeling dizzy or lightheaded.  Patient states he lost his footing and fell.  Patient is noted drink about 12 beers per day.  States that he does get shaky at times when not taking it.  He does take Valium as needed for his panic attacks.  Patient is awake, alert, states he has been eating and drinking like normal.  Denies any fevers, chills, headache, vision or hearing changes, dysuria, hematuria, constipation, diarrhea.  Denies any confusion, hallucinations, SI or HI.  Patient's family members are sitting at bedside.  Patient had CT scan of the chest that was done which revealed a large 5 cm mass with spiculated edges on the left side along with a 1.2 cm mass on the right side.  These findings are concerning for bronchogenic carcinoma.  This was discussed with the patient and family by the ER physician.  Patient does not have a pulmonologist.  Patient denies any other chest pains, shortness of breath.    Patient was found to have a sodium of 117 in the ER.  Nephrology was consulted and catheter was placed with brisk diuresis.  Additionally, patient was found to have hyperkalemia.  He also was found to have a left femoral neck fracture which will likely need repair by orthopedic surgery.  Patient states his main concern is the pain in his left hip which is severe and throbbing.  No radiation.      ED course:   Initial blood work and imaging studies performed. Admission

## 2024-11-27 ENCOUNTER — APPOINTMENT (OUTPATIENT)
Dept: GENERAL RADIOLOGY | Age: 74
DRG: 521 | End: 2024-11-27
Payer: MEDICARE

## 2024-11-27 ENCOUNTER — ANESTHESIA EVENT (OUTPATIENT)
Dept: OPERATING ROOM | Age: 74
End: 2024-11-27
Payer: MEDICARE

## 2024-11-27 ENCOUNTER — APPOINTMENT (OUTPATIENT)
Dept: CT IMAGING | Age: 74
DRG: 521 | End: 2024-11-27
Payer: MEDICARE

## 2024-11-27 LAB
ALBUMIN SERPL-MCNC: 3.6 G/DL (ref 3.5–5.2)
ALP SERPL-CCNC: 73 U/L (ref 40–129)
ALT SERPL-CCNC: 18 U/L (ref 0–40)
ANION GAP SERPL CALCULATED.3IONS-SCNC: 11 MMOL/L (ref 7–16)
ANION GAP SERPL CALCULATED.3IONS-SCNC: 11 MMOL/L (ref 7–16)
ANION GAP SERPL CALCULATED.3IONS-SCNC: 8 MMOL/L (ref 7–16)
ANION GAP SERPL CALCULATED.3IONS-SCNC: 9 MMOL/L (ref 7–16)
AST SERPL-CCNC: 29 U/L (ref 0–39)
BASOPHILS # BLD: 0.02 K/UL (ref 0–0.2)
BASOPHILS NFR BLD: 0 % (ref 0–2)
BILIRUB DIRECT SERPL-MCNC: <0.2 MG/DL (ref 0–0.3)
BILIRUB INDIRECT SERPL-MCNC: ABNORMAL MG/DL (ref 0–1)
BILIRUB SERPL-MCNC: 0.5 MG/DL (ref 0–1.2)
BUN SERPL-MCNC: 13 MG/DL (ref 6–23)
BUN SERPL-MCNC: 13 MG/DL (ref 6–23)
BUN SERPL-MCNC: 15 MG/DL (ref 6–23)
BUN SERPL-MCNC: 15 MG/DL (ref 6–23)
CALCIUM SERPL-MCNC: 8.5 MG/DL (ref 8.6–10.2)
CALCIUM SERPL-MCNC: 8.8 MG/DL (ref 8.6–10.2)
CALCIUM SERPL-MCNC: 8.8 MG/DL (ref 8.6–10.2)
CALCIUM SERPL-MCNC: 9 MG/DL (ref 8.6–10.2)
CHLORIDE SERPL-SCNC: 89 MMOL/L (ref 98–107)
CHLORIDE SERPL-SCNC: 90 MMOL/L (ref 98–107)
CHLORIDE SERPL-SCNC: 91 MMOL/L (ref 98–107)
CHLORIDE SERPL-SCNC: 92 MMOL/L (ref 98–107)
CO2 SERPL-SCNC: 23 MMOL/L (ref 22–29)
CO2 SERPL-SCNC: 24 MMOL/L (ref 22–29)
CORTIS SERPL-MCNC: 17.9 UG/DL (ref 2.7–18.4)
CREAT SERPL-MCNC: 0.9 MG/DL (ref 0.7–1.2)
CREAT SERPL-MCNC: 1.1 MG/DL (ref 0.7–1.2)
EOSINOPHIL # BLD: 0.02 K/UL (ref 0.05–0.5)
EOSINOPHILS RELATIVE PERCENT: 0 % (ref 0–6)
ERYTHROCYTE [DISTWIDTH] IN BLOOD BY AUTOMATED COUNT: 12.4 % (ref 11.5–15)
GFR, ESTIMATED: 74 ML/MIN/1.73M2
GFR, ESTIMATED: 90 ML/MIN/1.73M2
GFR, ESTIMATED: 90 ML/MIN/1.73M2
GFR, ESTIMATED: >90 ML/MIN/1.73M2
GLUCOSE SERPL-MCNC: 144 MG/DL (ref 74–99)
GLUCOSE SERPL-MCNC: 210 MG/DL (ref 74–99)
GLUCOSE SERPL-MCNC: 94 MG/DL (ref 74–99)
GLUCOSE SERPL-MCNC: 99 MG/DL (ref 74–99)
HCT VFR BLD AUTO: 31.2 % (ref 37–54)
HGB BLD-MCNC: 10.9 G/DL (ref 12.5–16.5)
IMM GRANULOCYTES # BLD AUTO: 0.03 K/UL (ref 0–0.58)
IMM GRANULOCYTES NFR BLD: 1 % (ref 0–5)
INR PPP: 0.9
LACTATE BLDV-SCNC: 1.1 MMOL/L (ref 0.5–2.2)
LYMPHOCYTES NFR BLD: 0.67 K/UL (ref 1.5–4)
LYMPHOCYTES RELATIVE PERCENT: 12 % (ref 20–42)
MCH RBC QN AUTO: 38.1 PG (ref 26–35)
MCHC RBC AUTO-ENTMCNC: 34.9 G/DL (ref 32–34.5)
MCV RBC AUTO: 109.1 FL (ref 80–99.9)
MONOCYTES NFR BLD: 0.8 K/UL (ref 0.1–0.95)
MONOCYTES NFR BLD: 14 % (ref 2–12)
NEUTROPHILS NFR BLD: 73 % (ref 43–80)
NEUTS SEG NFR BLD: 4.18 K/UL (ref 1.8–7.3)
PHOSPHATE SERPL-MCNC: 3.6 MG/DL (ref 2.5–4.5)
PLATELET # BLD AUTO: 249 K/UL (ref 130–450)
PMV BLD AUTO: 9.1 FL (ref 7–12)
POTASSIUM SERPL-SCNC: 4.5 MMOL/L (ref 3.5–5)
POTASSIUM SERPL-SCNC: 5 MMOL/L (ref 3.5–5)
POTASSIUM SERPL-SCNC: 5.1 MMOL/L (ref 3.5–5)
POTASSIUM SERPL-SCNC: 5.3 MMOL/L (ref 3.5–5)
PROT SERPL-MCNC: 6 G/DL (ref 6.4–8.3)
PROTHROMBIN TIME: 10.2 SEC (ref 9.3–12.4)
RBC # BLD AUTO: 2.86 M/UL (ref 3.8–5.8)
SODIUM SERPL-SCNC: 123 MMOL/L (ref 132–146)
SODIUM SERPL-SCNC: 127 MMOL/L (ref 132–146)
URATE SERPL-MCNC: 4.3 MG/DL (ref 3.4–7)
WBC OTHER # BLD: 5.7 K/UL (ref 4.5–11.5)

## 2024-11-27 PROCEDURE — 83605 ASSAY OF LACTIC ACID: CPT

## 2024-11-27 PROCEDURE — 0BBL3ZX EXCISION OF LEFT LUNG, PERCUTANEOUS APPROACH, DIAGNOSTIC: ICD-10-PCS | Performed by: RADIOLOGY

## 2024-11-27 PROCEDURE — 82533 TOTAL CORTISOL: CPT

## 2024-11-27 PROCEDURE — 85610 PROTHROMBIN TIME: CPT

## 2024-11-27 PROCEDURE — 6370000000 HC RX 637 (ALT 250 FOR IP): Performed by: HOSPITALIST

## 2024-11-27 PROCEDURE — 6360000002 HC RX W HCPCS: Performed by: HOSPITALIST

## 2024-11-27 PROCEDURE — 84100 ASSAY OF PHOSPHORUS: CPT

## 2024-11-27 PROCEDURE — 2709999900 CT NEEDLE BIOPSY LUNG PERCUTANEOUS W IMAGING GUIDANCE

## 2024-11-27 PROCEDURE — 6360000002 HC RX W HCPCS: Performed by: RADIOLOGY

## 2024-11-27 PROCEDURE — 85025 COMPLETE CBC W/AUTO DIFF WBC: CPT

## 2024-11-27 PROCEDURE — 2580000003 HC RX 258: Performed by: HOSPITALIST

## 2024-11-27 PROCEDURE — 84550 ASSAY OF BLOOD/URIC ACID: CPT

## 2024-11-27 PROCEDURE — 94664 DEMO&/EVAL PT USE INHALER: CPT

## 2024-11-27 PROCEDURE — 6370000000 HC RX 637 (ALT 250 FOR IP): Performed by: STUDENT IN AN ORGANIZED HEALTH CARE EDUCATION/TRAINING PROGRAM

## 2024-11-27 PROCEDURE — 82248 BILIRUBIN DIRECT: CPT

## 2024-11-27 PROCEDURE — 32408 CORE NDL BX LNG/MED PERQ: CPT

## 2024-11-27 PROCEDURE — 80048 BASIC METABOLIC PNL TOTAL CA: CPT

## 2024-11-27 PROCEDURE — 99223 1ST HOSP IP/OBS HIGH 75: CPT | Performed by: INTERNAL MEDICINE

## 2024-11-27 PROCEDURE — 80053 COMPREHEN METABOLIC PANEL: CPT

## 2024-11-27 PROCEDURE — 71046 X-RAY EXAM CHEST 2 VIEWS: CPT

## 2024-11-27 PROCEDURE — 88305 TISSUE EXAM BY PATHOLOGIST: CPT

## 2024-11-27 PROCEDURE — 2580000003 HC RX 258: Performed by: INTERNAL MEDICINE

## 2024-11-27 PROCEDURE — 2060000000 HC ICU INTERMEDIATE R&B

## 2024-11-27 RX ORDER — ALBUTEROL SULFATE 0.83 MG/ML
2.5 SOLUTION RESPIRATORY (INHALATION)
Status: DISCONTINUED | OUTPATIENT
Start: 2024-11-27 | End: 2024-11-29

## 2024-11-27 RX ORDER — FOLIC ACID 1 MG/1
1 TABLET ORAL DAILY
Status: DISCONTINUED | OUTPATIENT
Start: 2024-11-28 | End: 2024-12-05 | Stop reason: HOSPADM

## 2024-11-27 RX ORDER — TRANEXAMIC ACID 10 MG/ML
1000 INJECTION, SOLUTION INTRAVENOUS
Status: ACTIVE | OUTPATIENT
Start: 2024-11-27 | End: 2024-11-27

## 2024-11-27 RX ORDER — DEXTROSE MONOHYDRATE 50 MG/ML
INJECTION, SOLUTION INTRAVENOUS ONCE
Status: COMPLETED | OUTPATIENT
Start: 2024-11-27 | End: 2024-11-27

## 2024-11-27 RX ORDER — 3% SODIUM CHLORIDE 3 G/100ML
25 INJECTION, SOLUTION INTRAVENOUS CONTINUOUS
Status: DISCONTINUED | OUTPATIENT
Start: 2024-11-27 | End: 2024-11-27

## 2024-11-27 RX ORDER — MIDAZOLAM HYDROCHLORIDE 2 MG/2ML
INJECTION, SOLUTION INTRAMUSCULAR; INTRAVENOUS PRN
Status: COMPLETED | OUTPATIENT
Start: 2024-11-27 | End: 2024-11-27

## 2024-11-27 RX ORDER — FENTANYL CITRATE 50 UG/ML
INJECTION, SOLUTION INTRAMUSCULAR; INTRAVENOUS PRN
Status: COMPLETED | OUTPATIENT
Start: 2024-11-27 | End: 2024-11-27

## 2024-11-27 RX ORDER — 3% SODIUM CHLORIDE 3 G/100ML
25 INJECTION, SOLUTION INTRAVENOUS ONCE
Status: DISCONTINUED | OUTPATIENT
Start: 2024-11-27 | End: 2024-11-27 | Stop reason: ALTCHOICE

## 2024-11-27 RX ADMIN — DEXTROSE MONOHYDRATE: 50 INJECTION, SOLUTION INTRAVENOUS at 14:48

## 2024-11-27 RX ADMIN — SODIUM CHLORIDE, PRESERVATIVE FREE 10 ML: 5 INJECTION INTRAVENOUS at 01:44

## 2024-11-27 RX ADMIN — SODIUM CHLORIDE, PRESERVATIVE FREE 10 ML: 5 INJECTION INTRAVENOUS at 06:05

## 2024-11-27 RX ADMIN — FENTANYL CITRATE 25 MCG: 50 INJECTION, SOLUTION INTRAMUSCULAR; INTRAVENOUS at 11:25

## 2024-11-27 RX ADMIN — SODIUM CHLORIDE, PRESERVATIVE FREE 10 ML: 5 INJECTION INTRAVENOUS at 09:56

## 2024-11-27 RX ADMIN — MORPHINE SULFATE 4 MG: 4 INJECTION, SOLUTION INTRAMUSCULAR; INTRAVENOUS at 16:53

## 2024-11-27 RX ADMIN — TIZANIDINE 2 MG: 4 TABLET ORAL at 12:59

## 2024-11-27 RX ADMIN — MORPHINE SULFATE 4 MG: 4 INJECTION, SOLUTION INTRAMUSCULAR; INTRAVENOUS at 01:44

## 2024-11-27 RX ADMIN — MORPHINE SULFATE 4 MG: 4 INJECTION, SOLUTION INTRAMUSCULAR; INTRAVENOUS at 23:46

## 2024-11-27 RX ADMIN — MIDAZOLAM HYDROCHLORIDE 0.5 MG: 1 INJECTION, SOLUTION INTRAMUSCULAR; INTRAVENOUS at 11:25

## 2024-11-27 RX ADMIN — MORPHINE SULFATE 4 MG: 4 INJECTION, SOLUTION INTRAMUSCULAR; INTRAVENOUS at 20:39

## 2024-11-27 RX ADMIN — MIDAZOLAM HYDROCHLORIDE 0.5 MG: 1 INJECTION, SOLUTION INTRAMUSCULAR; INTRAVENOUS at 11:15

## 2024-11-27 RX ADMIN — SODIUM CHLORIDE, PRESERVATIVE FREE 10 ML: 5 INJECTION INTRAVENOUS at 16:55

## 2024-11-27 RX ADMIN — ALBUTEROL SULFATE 2.5 MG: 2.5 SOLUTION RESPIRATORY (INHALATION) at 20:06

## 2024-11-27 RX ADMIN — FENTANYL CITRATE 25 MCG: 50 INJECTION, SOLUTION INTRAMUSCULAR; INTRAVENOUS at 11:15

## 2024-11-27 RX ADMIN — Medication 100 MG: at 13:00

## 2024-11-27 RX ADMIN — MORPHINE SULFATE 4 MG: 4 INJECTION, SOLUTION INTRAMUSCULAR; INTRAVENOUS at 06:05

## 2024-11-27 RX ADMIN — MORPHINE SULFATE 4 MG: 4 INJECTION, SOLUTION INTRAMUSCULAR; INTRAVENOUS at 13:00

## 2024-11-27 RX ADMIN — TIZANIDINE 2 MG: 4 TABLET ORAL at 16:48

## 2024-11-27 RX ADMIN — TAMSULOSIN HYDROCHLORIDE 0.4 MG: 0.4 CAPSULE ORAL at 12:59

## 2024-11-27 RX ADMIN — SODIUM CHLORIDE, PRESERVATIVE FREE 10 ML: 5 INJECTION INTRAVENOUS at 14:45

## 2024-11-27 RX ADMIN — TIZANIDINE 2 MG: 4 TABLET ORAL at 20:38

## 2024-11-27 RX ADMIN — AMLODIPINE BESYLATE 5 MG: 5 TABLET ORAL at 13:00

## 2024-11-27 RX ADMIN — MORPHINE SULFATE 4 MG: 4 INJECTION, SOLUTION INTRAMUSCULAR; INTRAVENOUS at 09:56

## 2024-11-27 RX ADMIN — SODIUM CHLORIDE 25 ML/HR: 3 INJECTION, SOLUTION INTRAVENOUS at 06:10

## 2024-11-27 ASSESSMENT — PAIN DESCRIPTION - LOCATION
LOCATION: HIP

## 2024-11-27 ASSESSMENT — PAIN - FUNCTIONAL ASSESSMENT
PAIN_FUNCTIONAL_ASSESSMENT: PREVENTS OR INTERFERES WITH ALL ACTIVE AND SOME PASSIVE ACTIVITIES
PAIN_FUNCTIONAL_ASSESSMENT: PREVENTS OR INTERFERES WITH ALL ACTIVE AND SOME PASSIVE ACTIVITIES

## 2024-11-27 ASSESSMENT — PAIN SCALES - GENERAL
PAINLEVEL_OUTOF10: 8
PAINLEVEL_OUTOF10: 9

## 2024-11-27 ASSESSMENT — PAIN DESCRIPTION - FREQUENCY
FREQUENCY: CONTINUOUS
FREQUENCY: CONTINUOUS

## 2024-11-27 ASSESSMENT — PAIN DESCRIPTION - ONSET
ONSET: ON-GOING
ONSET: ON-GOING

## 2024-11-27 ASSESSMENT — PAIN DESCRIPTION - ORIENTATION
ORIENTATION: LEFT

## 2024-11-27 ASSESSMENT — PAIN DESCRIPTION - DESCRIPTORS
DESCRIPTORS: ACHING;DISCOMFORT;TENDER
DESCRIPTORS: ACHING;DISCOMFORT;TENDER

## 2024-11-27 ASSESSMENT — PAIN DESCRIPTION - PAIN TYPE
TYPE: ACUTE PAIN
TYPE: ACUTE PAIN

## 2024-11-27 ASSESSMENT — PAIN SCALES - WONG BAKER: WONGBAKER_NUMERICALRESPONSE: NO HURT

## 2024-11-27 NOTE — BRIEF OP NOTE
Diagnosis: Left lung mass    Procedure: Ct guided lung biopsy    Findings: CT guided lung biopsy performed with 20G biopsy gun. Post biopsy images show small pneumothorax.    Specimen: 5 cores    EBL: <5cc    Complication: Small left PTX    Plan: Will get F/U CXR now and at 1430 today. Consider placing chest tube if PTX significantly increases.

## 2024-11-27 NOTE — OR NURSING
Patient arrived from room 437 to cat scan for left lung biopsy. Consent signed, vitals taken, Dr. Eric in to speak with the patient about the procedure, all questions answered. Patient placed supine on cat scan table with monitoring devices attached. Patient prepped and draped, needle inserted to left anterior upper chest and biopsy taken x 5 cores. Images taken and reviewed by Dr. Eric. Needle removed, site cleansed, and dry dressing applied. Procedure completed @ . Patient transported back to room 437, nurse handoff report called to floor. Biopsy specimen taken to lab, post procedures orders placed.

## 2024-11-27 NOTE — CARE COORDINATION
Initial CM Assessment-Met with patient and his daughter at the bedside, introduced myself and CM role in care coordination/discharge planning. Patient lives alone in an apartment, recent fall resulting in a left femoral head fracture. Ortho following-Plan for surgery tentatively on 11/29/2024. Discussed post care for hip repair, SNF list provided, daughter will review and provide three choices. PT/OT to see once ortho clears after surgery. Lung biopsy today-Oncology following. PCP is Dr. Tracey Jackson-saw in office last week, preferred pharmacy is [facility] on [address] [facility]. Discharge plan is accepting SNF. Patient will require pre-cert/auth to SNF.     Marianna DALY, RN  [facility]

## 2024-11-27 NOTE — PRE SEDATION
Sedation Pre-Procedure Note    Patient Name: Mike Morrell Jr.   YOB: 1950  Room/Bed: 0437/0437-A  Medical Record Number: [id number]  Date: 11/27/2024   Time: 10:48 AM       Indication:  Image guided lung biopsy with possible conscious sedation and possible chest tube insertion.     Consent: IDr. Eric have discussed with the patient and/or the patient representative the indication, alternatives, and the possible risks and/or complications of the planned procedure and the anesthesia methods. The patient and/or patient representative appear to understand and agree to proceed.    Vital Signs:   Vitals:    11/27/24 0711   BP: 107/80   Pulse: 85   Resp: 16   Temp:    SpO2:        Past Medical History:   has a past medical history of Diarrhea, Hyperlipidemia, Hypertension, PONV (postoperative nausea and vomiting), and Urinary frequency.    Past Surgical History:   has a past surgical history that includes Cardiac surgery; ventral hernia repair (11/01/2013); Umbilical hernia repair (11/01/2013); Inguinal hernia repair (Bilateral, 11/01/2013); Coronary artery bypass graft; pacemaker placement; Colonoscopy; and sigmoidoscopy (N/A, 5/20/2024).    Medications:   Scheduled Meds:    ceFAZolin  2,000 mg IntraVENous On Call to OR    tranexamic acid-NaCl  1,000 mg IntraVENous On Call to OR    thiamine  100 mg Oral Daily    sodium zirconium cyclosilicate  10 g Oral Once    tamsulosin  0.4 mg Oral Daily    amLODIPine  5 mg Oral Daily    [Held by provider] lisinopril  20 mg Oral Daily    tiZANidine  2 mg Oral TID    sodium chloride flush  5-40 mL IntraVENous 2 times per day    enoxaparin  40 mg SubCUTAneous Daily     Continuous Infusions:    sodium chloride       PRN Meds: LORazepam **OR** [DISCONTINUED] LORazepam **OR** LORazepam **OR** [DISCONTINUED] LORazepam **OR** LORazepam **OR** [DISCONTINUED] LORazepam **OR** LORazepam **OR** [DISCONTINUED] LORazepam, nitroGLYCERIN, HYDROcodone-acetaminophen, diazePAM, sodium

## 2024-11-27 NOTE — PLAN OF CARE
Problem: ABCDS Injury Assessment  Goal: Absence of physical injury  Outcome: Progressing     Problem: Skin/Tissue Integrity  Goal: Absence of new skin breakdown  Description: 1.  Monitor for areas of redness and/or skin breakdown  2.  Assess vascular access sites hourly  3.  Every 4-6 hours minimum:  Change oxygen saturation probe site  4.  Every 4-6 hours:  If on nasal continuous positive airway pressure, respiratory therapy assess nares and determine need for appliance change or resting period.  Outcome: Progressing     Problem: Discharge Planning  Goal: Discharge to home or other facility with appropriate resources  Outcome: Progressing     Problem: Safety - Adult  Goal: Free from fall injury  Outcome: Progressing     Problem: Pain  Goal: Verbalizes/displays adequate comfort level or baseline comfort level  Outcome: Progressing     Problem: Respiratory - Adult  Goal: Achieves optimal ventilation and oxygenation  Outcome: Progressing     Problem: Skin/Tissue Integrity - Adult  Goal: Skin integrity remains intact  Outcome: Progressing     Problem: Musculoskeletal - Adult  Goal: Return mobility to safest level of function  Outcome: Progressing  Goal: Maintain proper alignment of affected body part  Outcome: Progressing  Goal: Return ADL status to a safe level of function  Outcome: Progressing     Problem: Genitourinary - Adult  Goal: Absence of urinary retention  Outcome: Progressing  Goal: Urinary catheter remains patent  Outcome: Progressing

## 2024-11-28 ENCOUNTER — APPOINTMENT (OUTPATIENT)
Dept: CT IMAGING | Age: 74
DRG: 521 | End: 2024-11-28
Payer: MEDICARE

## 2024-11-28 ENCOUNTER — APPOINTMENT (OUTPATIENT)
Dept: GENERAL RADIOLOGY | Age: 74
DRG: 521 | End: 2024-11-28
Payer: MEDICARE

## 2024-11-28 LAB
ALBUMIN SERPL-MCNC: 3.3 G/DL (ref 3.5–5.2)
ALP SERPL-CCNC: 63 U/L (ref 40–129)
ALT SERPL-CCNC: 14 U/L (ref 0–40)
ANION GAP SERPL CALCULATED.3IONS-SCNC: 11 MMOL/L (ref 7–16)
AST SERPL-CCNC: 25 U/L (ref 0–39)
BASOPHILS # BLD: 0.02 K/UL (ref 0–0.2)
BASOPHILS NFR BLD: 0 % (ref 0–2)
BILIRUB SERPL-MCNC: 0.4 MG/DL (ref 0–1.2)
BUN SERPL-MCNC: 15 MG/DL (ref 6–23)
CALCIUM SERPL-MCNC: 8.9 MG/DL (ref 8.6–10.2)
CHLORIDE SERPL-SCNC: 90 MMOL/L (ref 98–107)
CO2 SERPL-SCNC: 23 MMOL/L (ref 22–29)
CREAT SERPL-MCNC: 0.9 MG/DL (ref 0.7–1.2)
EOSINOPHIL # BLD: 0.05 K/UL (ref 0.05–0.5)
EOSINOPHILS RELATIVE PERCENT: 1 % (ref 0–6)
ERYTHROCYTE [DISTWIDTH] IN BLOOD BY AUTOMATED COUNT: 12.5 % (ref 11.5–15)
FERRITIN SERPL-MCNC: 524 NG/ML
FOLATE SERPL-MCNC: 5.6 NG/ML (ref 4.8–24.2)
GFR, ESTIMATED: >90 ML/MIN/1.73M2
GLUCOSE SERPL-MCNC: 116 MG/DL (ref 74–99)
HCT VFR BLD AUTO: 27.7 % (ref 37–54)
HGB BLD-MCNC: 9.6 G/DL (ref 12.5–16.5)
IMM GRANULOCYTES # BLD AUTO: 0.04 K/UL (ref 0–0.58)
IMM GRANULOCYTES NFR BLD: 1 % (ref 0–5)
IRON SATN MFR SERPL: 13 % (ref 20–55)
IRON SERPL-MCNC: 26 UG/DL (ref 59–158)
LYMPHOCYTES NFR BLD: 0.6 K/UL (ref 1.5–4)
LYMPHOCYTES RELATIVE PERCENT: 9 % (ref 20–42)
MCH RBC QN AUTO: 37.6 PG (ref 26–35)
MCHC RBC AUTO-ENTMCNC: 34.7 G/DL (ref 32–34.5)
MCV RBC AUTO: 108.6 FL (ref 80–99.9)
MONOCYTES NFR BLD: 0.74 K/UL (ref 0.1–0.95)
MONOCYTES NFR BLD: 11 % (ref 2–12)
NEUTROPHILS NFR BLD: 78 % (ref 43–80)
NEUTS SEG NFR BLD: 5.08 K/UL (ref 1.8–7.3)
PLATELET # BLD AUTO: 204 K/UL (ref 130–450)
PMV BLD AUTO: 9.3 FL (ref 7–12)
POTASSIUM SERPL-SCNC: 5.2 MMOL/L (ref 3.5–5)
PROT SERPL-MCNC: 5.7 G/DL (ref 6.4–8.3)
RBC # BLD AUTO: 2.55 M/UL (ref 3.8–5.8)
SODIUM SERPL-SCNC: 122 MMOL/L (ref 132–146)
SODIUM SERPL-SCNC: 124 MMOL/L (ref 132–146)
SODIUM SERPL-SCNC: 124 MMOL/L (ref 132–146)
SODIUM SERPL-SCNC: 125 MMOL/L (ref 132–146)
TIBC SERPL-MCNC: 201 UG/DL (ref 250–450)
TSH SERPL DL<=0.05 MIU/L-ACNC: 2.22 UIU/ML (ref 0.27–4.2)
URATE SERPL-MCNC: 5 MG/DL (ref 3.4–7)
VIT B12 SERPL-MCNC: 281 PG/ML (ref 211–946)
WBC OTHER # BLD: 6.5 K/UL (ref 4.5–11.5)

## 2024-11-28 PROCEDURE — 84550 ASSAY OF BLOOD/URIC ACID: CPT

## 2024-11-28 PROCEDURE — 82728 ASSAY OF FERRITIN: CPT

## 2024-11-28 PROCEDURE — 83540 ASSAY OF IRON: CPT

## 2024-11-28 PROCEDURE — 6370000000 HC RX 637 (ALT 250 FOR IP): Performed by: INTERNAL MEDICINE

## 2024-11-28 PROCEDURE — 2580000003 HC RX 258: Performed by: HOSPITALIST

## 2024-11-28 PROCEDURE — 6360000002 HC RX W HCPCS: Performed by: HOSPITALIST

## 2024-11-28 PROCEDURE — 6360000004 HC RX CONTRAST MEDICATION: Performed by: RADIOLOGY

## 2024-11-28 PROCEDURE — 84443 ASSAY THYROID STIM HORMONE: CPT

## 2024-11-28 PROCEDURE — 2580000003 HC RX 258: Performed by: INTERNAL MEDICINE

## 2024-11-28 PROCEDURE — 6370000000 HC RX 637 (ALT 250 FOR IP): Performed by: HOSPITALIST

## 2024-11-28 PROCEDURE — 6360000002 HC RX W HCPCS: Performed by: INTERNAL MEDICINE

## 2024-11-28 PROCEDURE — 2700000000 HC OXYGEN THERAPY PER DAY

## 2024-11-28 PROCEDURE — 82607 VITAMIN B-12: CPT

## 2024-11-28 PROCEDURE — 6370000000 HC RX 637 (ALT 250 FOR IP): Performed by: STUDENT IN AN ORGANIZED HEALTH CARE EDUCATION/TRAINING PROGRAM

## 2024-11-28 PROCEDURE — 84295 ASSAY OF SERUM SODIUM: CPT

## 2024-11-28 PROCEDURE — 80053 COMPREHEN METABOLIC PANEL: CPT

## 2024-11-28 PROCEDURE — 74177 CT ABD & PELVIS W/CONTRAST: CPT

## 2024-11-28 PROCEDURE — 84155 ASSAY OF PROTEIN SERUM: CPT

## 2024-11-28 PROCEDURE — 85025 COMPLETE CBC W/AUTO DIFF WBC: CPT

## 2024-11-28 PROCEDURE — 82746 ASSAY OF FOLIC ACID SERUM: CPT

## 2024-11-28 PROCEDURE — 84165 PROTEIN E-PHORESIS SERUM: CPT

## 2024-11-28 PROCEDURE — 99232 SBSQ HOSP IP/OBS MODERATE 35: CPT | Performed by: INTERNAL MEDICINE

## 2024-11-28 PROCEDURE — 83550 IRON BINDING TEST: CPT

## 2024-11-28 PROCEDURE — 2060000000 HC ICU INTERMEDIATE R&B

## 2024-11-28 PROCEDURE — 71045 X-RAY EXAM CHEST 1 VIEW: CPT

## 2024-11-28 PROCEDURE — 94640 AIRWAY INHALATION TREATMENT: CPT

## 2024-11-28 RX ORDER — TOLVAPTAN 15 MG/1
15 TABLET ORAL ONCE
Status: COMPLETED | OUTPATIENT
Start: 2024-11-28 | End: 2024-11-28

## 2024-11-28 RX ORDER — HYDROCODONE BITARTRATE AND ACETAMINOPHEN 5; 325 MG/1; MG/1
1 TABLET ORAL EVERY 6 HOURS PRN
Status: DISCONTINUED | OUTPATIENT
Start: 2024-11-28 | End: 2024-11-28

## 2024-11-28 RX ORDER — IOPAMIDOL 755 MG/ML
75 INJECTION, SOLUTION INTRAVASCULAR
Status: COMPLETED | OUTPATIENT
Start: 2024-11-28 | End: 2024-11-28

## 2024-11-28 RX ORDER — CYANOCOBALAMIN 1000 UG/ML
1000 INJECTION, SOLUTION INTRAMUSCULAR; SUBCUTANEOUS ONCE
Status: COMPLETED | OUTPATIENT
Start: 2024-11-28 | End: 2024-11-28

## 2024-11-28 RX ORDER — IOPAMIDOL 755 MG/ML
18 INJECTION, SOLUTION INTRAVASCULAR
Status: COMPLETED | OUTPATIENT
Start: 2024-11-28 | End: 2024-11-28

## 2024-11-28 RX ORDER — 3% SODIUM CHLORIDE 3 G/100ML
25 INJECTION, SOLUTION INTRAVENOUS CONTINUOUS
Status: DISCONTINUED | OUTPATIENT
Start: 2024-11-28 | End: 2024-11-28

## 2024-11-28 RX ORDER — FERROUS SULFATE 325(65) MG
325 TABLET ORAL
Status: DISCONTINUED | OUTPATIENT
Start: 2024-11-29 | End: 2024-12-05 | Stop reason: HOSPADM

## 2024-11-28 RX ORDER — 3% SODIUM CHLORIDE 3 G/100ML
25 INJECTION, SOLUTION INTRAVENOUS CONTINUOUS
Status: DISPENSED | OUTPATIENT
Start: 2024-11-28 | End: 2024-11-29

## 2024-11-28 RX ORDER — OXYCODONE AND ACETAMINOPHEN 5; 325 MG/1; MG/1
1 TABLET ORAL EVERY 6 HOURS PRN
Status: DISCONTINUED | OUTPATIENT
Start: 2024-11-28 | End: 2024-11-30 | Stop reason: ALTCHOICE

## 2024-11-28 RX ADMIN — SODIUM CHLORIDE, PRESERVATIVE FREE 10 ML: 5 INJECTION INTRAVENOUS at 20:19

## 2024-11-28 RX ADMIN — FOLIC ACID 1 MG: 1 TABLET ORAL at 08:02

## 2024-11-28 RX ADMIN — MORPHINE SULFATE 4 MG: 4 INJECTION, SOLUTION INTRAMUSCULAR; INTRAVENOUS at 16:47

## 2024-11-28 RX ADMIN — ALBUTEROL SULFATE 2.5 MG: 2.5 SOLUTION RESPIRATORY (INHALATION) at 12:04

## 2024-11-28 RX ADMIN — ALBUTEROL SULFATE 2.5 MG: 2.5 SOLUTION RESPIRATORY (INHALATION) at 06:08

## 2024-11-28 RX ADMIN — SODIUM CHLORIDE, PRESERVATIVE FREE 10 ML: 5 INJECTION INTRAVENOUS at 08:02

## 2024-11-28 RX ADMIN — MORPHINE SULFATE 4 MG: 4 INJECTION, SOLUTION INTRAMUSCULAR; INTRAVENOUS at 12:10

## 2024-11-28 RX ADMIN — TAMSULOSIN HYDROCHLORIDE 0.4 MG: 0.4 CAPSULE ORAL at 08:01

## 2024-11-28 RX ADMIN — TIZANIDINE 2 MG: 4 TABLET ORAL at 08:01

## 2024-11-28 RX ADMIN — TIZANIDINE 2 MG: 4 TABLET ORAL at 20:17

## 2024-11-28 RX ADMIN — MORPHINE SULFATE 4 MG: 4 INJECTION, SOLUTION INTRAMUSCULAR; INTRAVENOUS at 20:16

## 2024-11-28 RX ADMIN — IOPAMIDOL 18 ML: 755 INJECTION, SOLUTION INTRAVENOUS at 16:39

## 2024-11-28 RX ADMIN — OXYCODONE HYDROCHLORIDE AND ACETAMINOPHEN 1 TABLET: 5; 325 TABLET ORAL at 22:01

## 2024-11-28 RX ADMIN — ALBUTEROL SULFATE 2.5 MG: 2.5 SOLUTION RESPIRATORY (INHALATION) at 20:07

## 2024-11-28 RX ADMIN — MORPHINE SULFATE 4 MG: 4 INJECTION, SOLUTION INTRAMUSCULAR; INTRAVENOUS at 07:11

## 2024-11-28 RX ADMIN — AMLODIPINE BESYLATE 5 MG: 5 TABLET ORAL at 08:02

## 2024-11-28 RX ADMIN — OXYCODONE HYDROCHLORIDE AND ACETAMINOPHEN 1 TABLET: 5; 325 TABLET ORAL at 15:50

## 2024-11-28 RX ADMIN — Medication 3 MG: at 22:01

## 2024-11-28 RX ADMIN — SODIUM CHLORIDE 25 ML/HR: 3 INJECTION, SOLUTION INTRAVENOUS at 23:17

## 2024-11-28 RX ADMIN — Medication 100 MG: at 08:02

## 2024-11-28 RX ADMIN — CYANOCOBALAMIN 1000 MCG: 1000 INJECTION, SOLUTION INTRAMUSCULAR; SUBCUTANEOUS at 12:26

## 2024-11-28 RX ADMIN — MORPHINE SULFATE 4 MG: 4 INJECTION, SOLUTION INTRAMUSCULAR; INTRAVENOUS at 04:01

## 2024-11-28 RX ADMIN — IOPAMIDOL 75 ML: 755 INJECTION, SOLUTION INTRAVENOUS at 16:39

## 2024-11-28 RX ADMIN — TIZANIDINE 2 MG: 4 TABLET ORAL at 14:00

## 2024-11-28 RX ADMIN — TOLVAPTAN 15 MG: 15 TABLET ORAL at 12:26

## 2024-11-28 RX ADMIN — LORAZEPAM 1 MG: 1 TABLET ORAL at 02:22

## 2024-11-28 ASSESSMENT — PAIN SCALES - GENERAL
PAINLEVEL_OUTOF10: 9

## 2024-11-28 ASSESSMENT — PAIN DESCRIPTION - LOCATION
LOCATION: HIP

## 2024-11-28 ASSESSMENT — PAIN - FUNCTIONAL ASSESSMENT: PAIN_FUNCTIONAL_ASSESSMENT: PREVENTS OR INTERFERES SOME ACTIVE ACTIVITIES AND ADLS

## 2024-11-28 ASSESSMENT — PAIN SCALES - WONG BAKER
WONGBAKER_NUMERICALRESPONSE: NO HURT
WONGBAKER_NUMERICALRESPONSE: NO HURT

## 2024-11-28 ASSESSMENT — PAIN DESCRIPTION - DESCRIPTORS: DESCRIPTORS: ACHING;THROBBING;SHARP

## 2024-11-28 ASSESSMENT — PAIN DESCRIPTION - ORIENTATION
ORIENTATION: LEFT
ORIENTATION: LEFT

## 2024-11-28 NOTE — PLAN OF CARE
Problem: ABCDS Injury Assessment  Goal: Absence of physical injury  11/28/2024 0251 by Willi Jimenez RN  Outcome: Progressing  11/27/2024 1831 by Beharry, Phyllis, RN  Outcome: Progressing     Problem: Skin/Tissue Integrity  Goal: Absence of new skin breakdown  Description: 1.  Monitor for areas of redness and/or skin breakdown  2.  Assess vascular access sites hourly  3.  Every 4-6 hours minimum:  Change oxygen saturation probe site  4.  Every 4-6 hours:  If on nasal continuous positive airway pressure, respiratory therapy assess nares and determine need for appliance change or resting period.  11/28/2024 0251 by Willi Jimenez RN  Outcome: Progressing  11/27/2024 1831 by Beharry, Phyllis, RN  Outcome: Progressing     Problem: Discharge Planning  Goal: Discharge to home or other facility with appropriate resources  11/28/2024 0251 by Willi Jimenez RN  Outcome: Progressing  11/27/2024 1831 by Beharry, Phyllis, RN  Outcome: Progressing     Problem: Safety - Adult  Goal: Free from fall injury  11/28/2024 0251 by Willi Jimenez RN  Outcome: Progressing  11/27/2024 1831 by Beharry, Phyllis, RN  Outcome: Progressing     Problem: Pain  Goal: Verbalizes/displays adequate comfort level or baseline comfort level  11/28/2024 0251 by Willi Jimenez RN  Outcome: Progressing  11/27/2024 1831 by Beharry, Phyllis, RN  Outcome: Progressing     Problem: Respiratory - Adult  Goal: Achieves optimal ventilation and oxygenation  11/28/2024 0251 by Willi Jimenez RN  Outcome: Progressing  11/27/2024 1831 by Beharry, Phyllis, RN  Outcome: Progressing     Problem: Skin/Tissue Integrity - Adult  Goal: Skin integrity remains intact  11/28/2024 0251 by Willi Jimenez RN  Outcome: Progressing  11/27/2024 1831 by Beharry, Phyllis, RN  Outcome: Progressing     Problem: Musculoskeletal - Adult  Goal: Return mobility to safest level of function  11/28/2024 0251 by Willi Jimenez

## 2024-11-28 NOTE — PLAN OF CARE
Problem: ABCDS Injury Assessment  Goal: Absence of physical injury  11/28/2024 0925 by Mikayla Barahona RN  Outcome: Progressing     Problem: Skin/Tissue Integrity  Goal: Absence of new skin breakdown  Description: 1.  Monitor for areas of redness and/or skin breakdown  2.  Assess vascular access sites hourly  3.  Every 4-6 hours minimum:  Change oxygen saturation probe site  4.  Every 4-6 hours:  If on nasal continuous positive airway pressure, respiratory therapy assess nares and determine need for appliance change or resting period.  11/28/2024 0925 by Mikayla Barahona RN  Outcome: Progressing     Problem: Discharge Planning  Goal: Discharge to home or other facility with appropriate resources  11/28/2024 0925 by Mikayla Barahona RN  Outcome: Progressing     Problem: Safety - Adult  Goal: Free from fall injury  11/28/2024 0925 by Mikayla Barahona RN  Outcome: Progressing     Problem: Pain  Goal: Verbalizes/displays adequate comfort level or baseline comfort level  11/28/2024 0925 by Mikayla Barahona RN  Outcome: Progressing     Problem: Respiratory - Adult  Goal: Achieves optimal ventilation and oxygenation  11/28/2024 0925 by Mikayla Barahona RN  Outcome: Progressing     Problem: Skin/Tissue Integrity - Adult  Goal: Skin integrity remains intact  11/28/2024 0925 by Mikayla Barahona RN  Outcome: Progressing     Problem: Musculoskeletal - Adult  Goal: Return mobility to safest level of function  11/28/2024 0925 by Mikayla Barahona RN  Outcome: Progressing     Problem: Musculoskeletal - Adult  Goal: Maintain proper alignment of affected body part  11/28/2024 0925 by Mikayla Barahona RN  Outcome: Progressing     Problem: Musculoskeletal - Adult  Goal: Return ADL status to a safe level of function  11/28/2024 0925 by Mikayla Barahona RN  Outcome: Progressing     Problem: Genitourinary - Adult  Goal: Absence of urinary retention  11/28/2024 0925 by Mikayla Barahona RN  Outcome: Progressing     Problem:

## 2024-11-29 ENCOUNTER — APPOINTMENT (OUTPATIENT)
Dept: NUCLEAR MEDICINE | Age: 74
DRG: 521 | End: 2024-11-29
Payer: MEDICARE

## 2024-11-29 ENCOUNTER — ANESTHESIA (OUTPATIENT)
Dept: OPERATING ROOM | Age: 74
End: 2024-11-29
Payer: MEDICARE

## 2024-11-29 ENCOUNTER — APPOINTMENT (OUTPATIENT)
Dept: GENERAL RADIOLOGY | Age: 74
DRG: 521 | End: 2024-11-29
Payer: MEDICARE

## 2024-11-29 ENCOUNTER — APPOINTMENT (OUTPATIENT)
Dept: MRI IMAGING | Age: 74
DRG: 521 | End: 2024-11-29
Payer: MEDICARE

## 2024-11-29 LAB
ALBUMIN SERPL-MCNC: 3.1 G/DL (ref 3.5–5.2)
ALP SERPL-CCNC: 61 U/L (ref 40–129)
ALT SERPL-CCNC: 9 U/L (ref 0–40)
ANION GAP SERPL CALCULATED.3IONS-SCNC: 8 MMOL/L (ref 7–16)
AST SERPL-CCNC: 23 U/L (ref 0–39)
BASOPHILS # BLD: 0.02 K/UL (ref 0–0.2)
BASOPHILS NFR BLD: 0 % (ref 0–2)
BILIRUB SERPL-MCNC: 0.5 MG/DL (ref 0–1.2)
BUN SERPL-MCNC: 13 MG/DL (ref 6–23)
CALCIUM SERPL-MCNC: 8.7 MG/DL (ref 8.6–10.2)
CHLORIDE SERPL-SCNC: 92 MMOL/L (ref 98–107)
CO2 SERPL-SCNC: 25 MMOL/L (ref 22–29)
CREAT SERPL-MCNC: 1 MG/DL (ref 0.7–1.2)
EOSINOPHIL # BLD: 0.18 K/UL (ref 0.05–0.5)
EOSINOPHILS RELATIVE PERCENT: 3 % (ref 0–6)
ERYTHROCYTE [DISTWIDTH] IN BLOOD BY AUTOMATED COUNT: 12.7 % (ref 11.5–15)
GFR, ESTIMATED: 75 ML/MIN/1.73M2
GLUCOSE SERPL-MCNC: 110 MG/DL (ref 74–99)
HCT VFR BLD AUTO: 25 % (ref 37–54)
HGB BLD-MCNC: 8.8 G/DL (ref 12.5–16.5)
IMM GRANULOCYTES # BLD AUTO: 0.03 K/UL (ref 0–0.58)
IMM GRANULOCYTES NFR BLD: 1 % (ref 0–5)
LYMPHOCYTES NFR BLD: 0.72 K/UL (ref 1.5–4)
LYMPHOCYTES RELATIVE PERCENT: 12 % (ref 20–42)
MCH RBC QN AUTO: 38.3 PG (ref 26–35)
MCHC RBC AUTO-ENTMCNC: 35.2 G/DL (ref 32–34.5)
MCV RBC AUTO: 108.7 FL (ref 80–99.9)
MONOCYTES NFR BLD: 0.75 K/UL (ref 0.1–0.95)
MONOCYTES NFR BLD: 13 % (ref 2–12)
NEUTROPHILS NFR BLD: 71 % (ref 43–80)
NEUTS SEG NFR BLD: 4.18 K/UL (ref 1.8–7.3)
PATH REV BLD -IMP: NORMAL
PLATELET # BLD AUTO: 177 K/UL (ref 130–450)
PMV BLD AUTO: 9.2 FL (ref 7–12)
POTASSIUM SERPL-SCNC: 5.2 MMOL/L (ref 3.5–5)
PROT SERPL-MCNC: 5.3 G/DL (ref 6.4–8.3)
RBC # BLD AUTO: 2.3 M/UL (ref 3.8–5.8)
SODIUM SERPL-SCNC: 125 MMOL/L (ref 132–146)
SODIUM SERPL-SCNC: 125 MMOL/L (ref 132–146)
SODIUM SERPL-SCNC: 127 MMOL/L (ref 132–146)
SODIUM SERPL-SCNC: 127 MMOL/L (ref 132–146)
WBC OTHER # BLD: 5.9 K/UL (ref 4.5–11.5)

## 2024-11-29 PROCEDURE — 6360000002 HC RX W HCPCS: Performed by: NURSE PRACTITIONER

## 2024-11-29 PROCEDURE — 78306 BONE IMAGING WHOLE BODY: CPT | Performed by: INTERNAL MEDICINE

## 2024-11-29 PROCEDURE — 70553 MRI BRAIN STEM W/O & W/DYE: CPT

## 2024-11-29 PROCEDURE — 2580000003 HC RX 258: Performed by: INTERNAL MEDICINE

## 2024-11-29 PROCEDURE — 84295 ASSAY OF SERUM SODIUM: CPT

## 2024-11-29 PROCEDURE — 2060000000 HC ICU INTERMEDIATE R&B

## 2024-11-29 PROCEDURE — 6360000004 HC RX CONTRAST MEDICATION: Performed by: RADIOLOGY

## 2024-11-29 PROCEDURE — 6370000000 HC RX 637 (ALT 250 FOR IP): Performed by: INTERNAL MEDICINE

## 2024-11-29 PROCEDURE — 99232 SBSQ HOSP IP/OBS MODERATE 35: CPT | Performed by: NURSE PRACTITIONER

## 2024-11-29 PROCEDURE — A9503 TC99M MEDRONATE: HCPCS | Performed by: RADIOLOGY

## 2024-11-29 PROCEDURE — 2580000003 HC RX 258: Performed by: HOSPITALIST

## 2024-11-29 PROCEDURE — A9577 INJ MULTIHANCE: HCPCS | Performed by: RADIOLOGY

## 2024-11-29 PROCEDURE — 3430000000 HC RX DIAGNOSTIC RADIOPHARMACEUTICAL: Performed by: RADIOLOGY

## 2024-11-29 PROCEDURE — 6370000000 HC RX 637 (ALT 250 FOR IP): Performed by: HOSPITALIST

## 2024-11-29 PROCEDURE — 80053 COMPREHEN METABOLIC PANEL: CPT

## 2024-11-29 PROCEDURE — 71045 X-RAY EXAM CHEST 1 VIEW: CPT

## 2024-11-29 PROCEDURE — 6360000002 HC RX W HCPCS: Performed by: HOSPITALIST

## 2024-11-29 PROCEDURE — 85025 COMPLETE CBC W/AUTO DIFF WBC: CPT

## 2024-11-29 RX ORDER — 3% SODIUM CHLORIDE 3 G/100ML
30 INJECTION, SOLUTION INTRAVENOUS CONTINUOUS
Status: DISPENSED | OUTPATIENT
Start: 2024-11-29 | End: 2024-11-29

## 2024-11-29 RX ORDER — TOLVAPTAN 15 MG/1
30 TABLET ORAL ONCE
Status: COMPLETED | OUTPATIENT
Start: 2024-11-29 | End: 2024-11-29

## 2024-11-29 RX ORDER — ALBUTEROL SULFATE 0.83 MG/ML
2.5 SOLUTION RESPIRATORY (INHALATION) EVERY 6 HOURS PRN
Status: DISCONTINUED | OUTPATIENT
Start: 2024-11-29 | End: 2024-12-05 | Stop reason: HOSPADM

## 2024-11-29 RX ORDER — TC 99M MEDRONATE 20 MG/10ML
25 INJECTION, POWDER, LYOPHILIZED, FOR SOLUTION INTRAVENOUS
Status: COMPLETED | OUTPATIENT
Start: 2024-11-29 | End: 2024-11-29

## 2024-11-29 RX ORDER — 3% SODIUM CHLORIDE 3 G/100ML
25 INJECTION, SOLUTION INTRAVENOUS CONTINUOUS
Status: DISPENSED | OUTPATIENT
Start: 2024-11-29 | End: 2024-11-29

## 2024-11-29 RX ORDER — 3% SODIUM CHLORIDE 3 G/100ML
30 INJECTION, SOLUTION INTRAVENOUS CONTINUOUS
Status: DISPENSED | OUTPATIENT
Start: 2024-11-29 | End: 2024-11-30

## 2024-11-29 RX ORDER — KETOROLAC TROMETHAMINE 15 MG/ML
15 INJECTION, SOLUTION INTRAMUSCULAR; INTRAVENOUS ONCE
Status: COMPLETED | OUTPATIENT
Start: 2024-11-29 | End: 2024-11-29

## 2024-11-29 RX ADMIN — MORPHINE SULFATE 4 MG: 4 INJECTION, SOLUTION INTRAMUSCULAR; INTRAVENOUS at 13:50

## 2024-11-29 RX ADMIN — OXYCODONE HYDROCHLORIDE AND ACETAMINOPHEN 1 TABLET: 5; 325 TABLET ORAL at 18:04

## 2024-11-29 RX ADMIN — MORPHINE SULFATE 4 MG: 4 INJECTION, SOLUTION INTRAMUSCULAR; INTRAVENOUS at 06:46

## 2024-11-29 RX ADMIN — TIZANIDINE 2 MG: 4 TABLET ORAL at 20:04

## 2024-11-29 RX ADMIN — KETOROLAC TROMETHAMINE 15 MG: 15 INJECTION, SOLUTION INTRAMUSCULAR; INTRAVENOUS at 00:27

## 2024-11-29 RX ADMIN — MORPHINE SULFATE 4 MG: 4 INJECTION, SOLUTION INTRAMUSCULAR; INTRAVENOUS at 23:08

## 2024-11-29 RX ADMIN — TC 99M MEDRONATE 25 MILLICURIE: 20 INJECTION, POWDER, LYOPHILIZED, FOR SOLUTION INTRAVENOUS at 13:13

## 2024-11-29 RX ADMIN — SODIUM CHLORIDE, PRESERVATIVE FREE 10 ML: 5 INJECTION INTRAVENOUS at 20:05

## 2024-11-29 RX ADMIN — TIZANIDINE 2 MG: 4 TABLET ORAL at 13:48

## 2024-11-29 RX ADMIN — SODIUM CHLORIDE, PRESERVATIVE FREE 10 ML: 5 INJECTION INTRAVENOUS at 17:04

## 2024-11-29 RX ADMIN — OXYCODONE HYDROCHLORIDE AND ACETAMINOPHEN 1 TABLET: 5; 325 TABLET ORAL at 05:15

## 2024-11-29 RX ADMIN — SODIUM CHLORIDE 30 ML/HR: 3 INJECTION, SOLUTION INTRAVENOUS at 22:00

## 2024-11-29 RX ADMIN — TOLVAPTAN 30 MG: 15 TABLET ORAL at 15:08

## 2024-11-29 RX ADMIN — GADOBENATE DIMEGLUMINE 12 ML: 529 INJECTION, SOLUTION INTRAVENOUS at 10:44

## 2024-11-29 RX ADMIN — MORPHINE SULFATE 4 MG: 4 INJECTION, SOLUTION INTRAMUSCULAR; INTRAVENOUS at 03:29

## 2024-11-29 RX ADMIN — SODIUM CHLORIDE, PRESERVATIVE FREE 10 ML: 5 INJECTION INTRAVENOUS at 23:08

## 2024-11-29 RX ADMIN — MORPHINE SULFATE 4 MG: 4 INJECTION, SOLUTION INTRAMUSCULAR; INTRAVENOUS at 20:05

## 2024-11-29 RX ADMIN — MORPHINE SULFATE 4 MG: 4 INJECTION, SOLUTION INTRAMUSCULAR; INTRAVENOUS at 17:04

## 2024-11-29 RX ADMIN — MORPHINE SULFATE 4 MG: 4 INJECTION, SOLUTION INTRAMUSCULAR; INTRAVENOUS at 09:47

## 2024-11-29 RX ADMIN — SODIUM CHLORIDE 30 ML/HR: 3 INJECTION, SOLUTION INTRAVENOUS at 17:23

## 2024-11-29 RX ADMIN — OXYCODONE HYDROCHLORIDE AND ACETAMINOPHEN 1 TABLET: 5; 325 TABLET ORAL at 11:49

## 2024-11-29 RX ADMIN — DIAZEPAM 2 MG: 2 TABLET ORAL at 08:58

## 2024-11-29 RX ADMIN — SODIUM CHLORIDE 25 ML/HR: 3 INJECTION, SOLUTION INTRAVENOUS at 05:17

## 2024-11-29 ASSESSMENT — PAIN SCALES - GENERAL
PAINLEVEL_OUTOF10: 8
PAINLEVEL_OUTOF10: 7
PAINLEVEL_OUTOF10: 8
PAINLEVEL_OUTOF10: 9
PAINLEVEL_OUTOF10: 9
PAINLEVEL_OUTOF10: 3
PAINLEVEL_OUTOF10: 4
PAINLEVEL_OUTOF10: 3
PAINLEVEL_OUTOF10: 0
PAINLEVEL_OUTOF10: 5
PAINLEVEL_OUTOF10: 6

## 2024-11-29 ASSESSMENT — PAIN DESCRIPTION - FREQUENCY
FREQUENCY: CONTINUOUS

## 2024-11-29 ASSESSMENT — PAIN DESCRIPTION - PAIN TYPE
TYPE: ACUTE PAIN

## 2024-11-29 ASSESSMENT — PAIN - FUNCTIONAL ASSESSMENT
PAIN_FUNCTIONAL_ASSESSMENT: PREVENTS OR INTERFERES SOME ACTIVE ACTIVITIES AND ADLS

## 2024-11-29 ASSESSMENT — PAIN DESCRIPTION - ORIENTATION
ORIENTATION: LEFT

## 2024-11-29 ASSESSMENT — PAIN DESCRIPTION - DIRECTION
RADIATING_TOWARDS: BACK

## 2024-11-29 ASSESSMENT — PAIN DESCRIPTION - LOCATION
LOCATION: LEG;GENERALIZED
LOCATION: LEG
LOCATION: LEG;GENERALIZED
LOCATION: LEG
LOCATION: HIP

## 2024-11-29 ASSESSMENT — PAIN DESCRIPTION - ONSET
ONSET: ON-GOING

## 2024-11-29 ASSESSMENT — PAIN DESCRIPTION - DESCRIPTORS
DESCRIPTORS: ACHING;CRAMPING;DISCOMFORT
DESCRIPTORS: ACHING;CRAMPING;DISCOMFORT
DESCRIPTORS: ACHING;DISCOMFORT;TENDER

## 2024-11-29 NOTE — CARE COORDINATION
CASE MANAGEMENT.. patient is from home alone. Patient admitted after recent fall resulting in L femoral hip fracture. Patient will need hemiarthroplasty but is not medically cleared for surgery. Will attempt again tomorrow pending clearance. SNF list was provided. Choices are as followed: [facility], and [facility]. Referral made to [facility] at [facility]. Await input.   Electronically signed by Avis Sarabia RN on 11/29/2024 at 1:10 PM    UPDATE: [facility] following, await final decision after surgery.   Electronically signed by Avis Sarabia RN on 11/29/2024 at 1:57 PM

## 2024-11-29 NOTE — PLAN OF CARE
Problem: ABCDS Injury Assessment  Goal: Absence of physical injury  11/29/2024 1007 by Mikayla Barahona RN  Outcome: Progressing     Problem: Skin/Tissue Integrity  Goal: Absence of new skin breakdown  Description: 1.  Monitor for areas of redness and/or skin breakdown  2.  Assess vascular access sites hourly  3.  Every 4-6 hours minimum:  Change oxygen saturation probe site  4.  Every 4-6 hours:  If on nasal continuous positive airway pressure, respiratory therapy assess nares and determine need for appliance change or resting period.  11/29/2024 1007 by Mikayla Barahona RN  Outcome: Progressing     Problem: Discharge Planning  Goal: Discharge to home or other facility with appropriate resources  11/29/2024 1007 by Mikayla Barahona RN  Outcome: Progressing     Problem: Safety - Adult  Goal: Free from fall injury  11/29/2024 1007 by Mikayla Barahona RN  Outcome: Progressing     Problem: Pain  Goal: Verbalizes/displays adequate comfort level or baseline comfort level  11/29/2024 1007 by Mikayla Barahona RN  Outcome: Progressing     Problem: Respiratory - Adult  Goal: Achieves optimal ventilation and oxygenation  11/29/2024 1007 by Mikayla Barahona RN  Outcome: Progressing     Problem: Skin/Tissue Integrity - Adult  Goal: Skin integrity remains intact  11/29/2024 1007 by Mikayla Barahona RN  Outcome: Progressing     Problem: Musculoskeletal - Adult  Goal: Return mobility to safest level of function  11/29/2024 1007 by Mikayla Barahona RN  Outcome: Progressing     Problem: Musculoskeletal - Adult  Goal: Maintain proper alignment of affected body part  11/29/2024 1007 by Mikayla Barahona RN  Outcome: Progressing     Problem: Musculoskeletal - Adult  Goal: Return ADL status to a safe level of function  11/29/2024 1007 by Mikayla Barahona RN  Outcome: Progressing     Problem: Genitourinary - Adult  Goal: Absence of urinary retention  11/29/2024 1007 by Mikayla Barahona RN  Outcome: Progressing     Problem:

## 2024-11-29 NOTE — ACP (ADVANCE CARE PLANNING)
Advance Care Planning   Healthcare Decision Maker:    Primary Decision Maker: MESSERAMMYVANNA - [facility] - [phone]    Click here to complete Healthcare Decision Makers including selection of the Healthcare Decision Maker Relationship (ie \"Primary\").

## 2024-11-30 ENCOUNTER — APPOINTMENT (OUTPATIENT)
Dept: GENERAL RADIOLOGY | Age: 74
DRG: 521 | End: 2024-11-30
Payer: MEDICARE

## 2024-11-30 LAB
ALBUMIN SERPL-MCNC: 3.3 G/DL (ref 3.5–5.2)
ALP SERPL-CCNC: 82 U/L (ref 40–129)
ALT SERPL-CCNC: 15 U/L (ref 0–40)
ANION GAP SERPL CALCULATED.3IONS-SCNC: 9 MMOL/L (ref 7–16)
AST SERPL-CCNC: 25 U/L (ref 0–39)
BILIRUB SERPL-MCNC: 0.5 MG/DL (ref 0–1.2)
BUN SERPL-MCNC: 12 MG/DL (ref 6–23)
CALCIUM SERPL-MCNC: 8.9 MG/DL (ref 8.6–10.2)
CHLORIDE SERPL-SCNC: 97 MMOL/L (ref 98–107)
CHOLEST SERPL-MCNC: 202 MG/DL
CO2 SERPL-SCNC: 25 MMOL/L (ref 22–29)
CREAT SERPL-MCNC: 0.9 MG/DL (ref 0.7–1.2)
GFR, ESTIMATED: 86 ML/MIN/1.73M2
GLUCOSE BLD-MCNC: 121 MG/DL (ref 74–99)
GLUCOSE SERPL-MCNC: 119 MG/DL (ref 74–99)
HDLC SERPL-MCNC: 126 MG/DL
LDLC SERPL CALC-MCNC: 55 MG/DL
POTASSIUM SERPL-SCNC: 5.3 MMOL/L (ref 3.5–5)
PROT SERPL-MCNC: 6 G/DL (ref 6.4–8.3)
SODIUM SERPL-SCNC: 131 MMOL/L (ref 132–146)
SODIUM SERPL-SCNC: 132 MMOL/L (ref 132–146)
TRIGL SERPL-MCNC: 104 MG/DL
VLDLC SERPL CALC-MCNC: 21 MG/DL

## 2024-11-30 PROCEDURE — 6370000000 HC RX 637 (ALT 250 FOR IP): Performed by: INTERNAL MEDICINE

## 2024-11-30 PROCEDURE — 82962 GLUCOSE BLOOD TEST: CPT

## 2024-11-30 PROCEDURE — 2500000003 HC RX 250 WO HCPCS: Performed by: GENERAL ACUTE CARE HOSPITAL

## 2024-11-30 PROCEDURE — 6360000002 HC RX W HCPCS: Performed by: HOSPITALIST

## 2024-11-30 PROCEDURE — 82947 ASSAY GLUCOSE BLOOD QUANT: CPT

## 2024-11-30 PROCEDURE — 3600000004 HC SURGERY LEVEL 4 BASE: Performed by: GENERAL ACUTE CARE HOSPITAL

## 2024-11-30 PROCEDURE — 84295 ASSAY OF SERUM SODIUM: CPT

## 2024-11-30 PROCEDURE — 80053 COMPREHEN METABOLIC PANEL: CPT

## 2024-11-30 PROCEDURE — 2580000003 HC RX 258: Performed by: NURSE ANESTHETIST, CERTIFIED REGISTERED

## 2024-11-30 PROCEDURE — 71045 X-RAY EXAM CHEST 1 VIEW: CPT

## 2024-11-30 PROCEDURE — 80061 LIPID PANEL: CPT

## 2024-11-30 PROCEDURE — 2720000010 HC SURG SUPPLY STERILE: Performed by: GENERAL ACUTE CARE HOSPITAL

## 2024-11-30 PROCEDURE — 73502 X-RAY EXAM HIP UNI 2-3 VIEWS: CPT

## 2024-11-30 PROCEDURE — 2580000003 HC RX 258: Performed by: GENERAL ACUTE CARE HOSPITAL

## 2024-11-30 PROCEDURE — 2060000000 HC ICU INTERMEDIATE R&B

## 2024-11-30 PROCEDURE — C1776 JOINT DEVICE (IMPLANTABLE): HCPCS | Performed by: GENERAL ACUTE CARE HOSPITAL

## 2024-11-30 PROCEDURE — 3700000001 HC ADD 15 MINUTES (ANESTHESIA): Performed by: GENERAL ACUTE CARE HOSPITAL

## 2024-11-30 PROCEDURE — 2709999900 HC NON-CHARGEABLE SUPPLY: Performed by: GENERAL ACUTE CARE HOSPITAL

## 2024-11-30 PROCEDURE — 6360000002 HC RX W HCPCS: Performed by: INTERNAL MEDICINE

## 2024-11-30 PROCEDURE — 0SRS0JZ REPLACEMENT OF LEFT HIP JOINT, FEMORAL SURFACE WITH SYNTHETIC SUBSTITUTE, OPEN APPROACH: ICD-10-PCS | Performed by: GENERAL ACUTE CARE HOSPITAL

## 2024-11-30 PROCEDURE — 6360000002 HC RX W HCPCS: Performed by: NURSE ANESTHETIST, CERTIFIED REGISTERED

## 2024-11-30 PROCEDURE — 88311 DECALCIFY TISSUE: CPT

## 2024-11-30 PROCEDURE — 6360000002 HC RX W HCPCS: Performed by: GENERAL ACUTE CARE HOSPITAL

## 2024-11-30 PROCEDURE — 51798 US URINE CAPACITY MEASURE: CPT

## 2024-11-30 PROCEDURE — 0W9B30Z DRAINAGE OF LEFT PLEURAL CAVITY WITH DRAINAGE DEVICE, PERCUTANEOUS APPROACH: ICD-10-PCS | Performed by: INTERNAL MEDICINE

## 2024-11-30 PROCEDURE — 3600000014 HC SURGERY LEVEL 4 ADDTL 15MIN: Performed by: GENERAL ACUTE CARE HOSPITAL

## 2024-11-30 PROCEDURE — C1713 ANCHOR/SCREW BN/BN,TIS/BN: HCPCS | Performed by: GENERAL ACUTE CARE HOSPITAL

## 2024-11-30 PROCEDURE — 2500000003 HC RX 250 WO HCPCS: Performed by: NURSE ANESTHETIST, CERTIFIED REGISTERED

## 2024-11-30 PROCEDURE — 88305 TISSUE EXAM BY PATHOLOGIST: CPT

## 2024-11-30 PROCEDURE — 6370000000 HC RX 637 (ALT 250 FOR IP): Performed by: GENERAL ACUTE CARE HOSPITAL

## 2024-11-30 PROCEDURE — 7100000001 HC PACU RECOVERY - ADDTL 15 MIN: Performed by: GENERAL ACUTE CARE HOSPITAL

## 2024-11-30 PROCEDURE — 99232 SBSQ HOSP IP/OBS MODERATE 35: CPT | Performed by: INTERNAL MEDICINE

## 2024-11-30 PROCEDURE — 7100000000 HC PACU RECOVERY - FIRST 15 MIN: Performed by: GENERAL ACUTE CARE HOSPITAL

## 2024-11-30 PROCEDURE — 6370000000 HC RX 637 (ALT 250 FOR IP): Performed by: HOSPITALIST

## 2024-11-30 PROCEDURE — 2700000000 HC OXYGEN THERAPY PER DAY

## 2024-11-30 PROCEDURE — 3700000000 HC ANESTHESIA ATTENDED CARE: Performed by: GENERAL ACUTE CARE HOSPITAL

## 2024-11-30 DEVICE — IMPLANTABLE DEVICE: Type: IMPLANTABLE DEVICE | Site: HIP | Status: FUNCTIONAL

## 2024-11-30 DEVICE — SET CBL SL SM DIA2MM VIT FOR RECON AND TRAUM SYS DALL-M: Type: IMPLANTABLE DEVICE | Site: HIP | Status: FUNCTIONAL

## 2024-11-30 DEVICE — HEAD FEM DIA28MM +3MM OFFSET FEM HIP CO CHROM TYP 1 PRI MOD: Type: IMPLANTABLE DEVICE | Site: HIP | Status: FUNCTIONAL

## 2024-11-30 DEVICE — CENTRALIZER STEM DIA11MM DST FEM HIP PMMA POS ECHO: Type: IMPLANTABLE DEVICE | Site: HIP | Status: FUNCTIONAL

## 2024-11-30 DEVICE — CEMENT BONE CONFLOW G: Type: IMPLANTABLE DEVICE | Site: HIP | Status: FUNCTIONAL

## 2024-11-30 RX ORDER — POLYETHYLENE GLYCOL 3350 17 G/17G
17 POWDER, FOR SOLUTION ORAL DAILY
Status: DISCONTINUED | OUTPATIENT
Start: 2024-11-30 | End: 2024-12-05 | Stop reason: HOSPADM

## 2024-11-30 RX ORDER — FENTANYL CITRATE 50 UG/ML
50 INJECTION, SOLUTION INTRAMUSCULAR; INTRAVENOUS ONCE
Status: COMPLETED | OUTPATIENT
Start: 2024-11-30 | End: 2024-11-30

## 2024-11-30 RX ORDER — FENTANYL CITRATE 50 UG/ML
25 INJECTION, SOLUTION INTRAMUSCULAR; INTRAVENOUS EVERY 5 MIN PRN
Status: DISCONTINUED | OUTPATIENT
Start: 2024-11-30 | End: 2024-11-30 | Stop reason: HOSPADM

## 2024-11-30 RX ORDER — SODIUM CHLORIDE 0.9 % (FLUSH) 0.9 %
5-40 SYRINGE (ML) INJECTION PRN
Status: DISCONTINUED | OUTPATIENT
Start: 2024-11-30 | End: 2024-12-05 | Stop reason: HOSPADM

## 2024-11-30 RX ORDER — MORPHINE SULFATE 4 MG/ML
4 INJECTION, SOLUTION INTRAMUSCULAR; INTRAVENOUS
Status: DISCONTINUED | OUTPATIENT
Start: 2024-11-30 | End: 2024-12-05 | Stop reason: HOSPADM

## 2024-11-30 RX ORDER — LANOLIN ALCOHOL/MO/W.PET/CERES
1000 CREAM (GRAM) TOPICAL DAILY
Status: DISCONTINUED | OUTPATIENT
Start: 2024-11-30 | End: 2024-12-05 | Stop reason: HOSPADM

## 2024-11-30 RX ORDER — LIDOCAINE HYDROCHLORIDE 20 MG/ML
INJECTION, SOLUTION EPIDURAL; INFILTRATION; INTRACAUDAL; PERINEURAL
Status: DISCONTINUED | OUTPATIENT
Start: 2024-11-30 | End: 2024-11-30 | Stop reason: SDUPTHER

## 2024-11-30 RX ORDER — GLUCAGON 1 MG/ML
1 KIT INJECTION PRN
Status: DISCONTINUED | OUTPATIENT
Start: 2024-11-30 | End: 2024-11-30 | Stop reason: HOSPADM

## 2024-11-30 RX ORDER — ONDANSETRON 4 MG/1
4 TABLET, ORALLY DISINTEGRATING ORAL EVERY 8 HOURS PRN
Status: DISCONTINUED | OUTPATIENT
Start: 2024-11-30 | End: 2024-12-05 | Stop reason: HOSPADM

## 2024-11-30 RX ORDER — OXYCODONE HYDROCHLORIDE 5 MG/1
5 TABLET ORAL EVERY 4 HOURS PRN
Status: DISCONTINUED | OUTPATIENT
Start: 2024-11-30 | End: 2024-12-05 | Stop reason: HOSPADM

## 2024-11-30 RX ORDER — NEOSTIGMINE METHYLSULFATE 1 MG/ML
INJECTION, SOLUTION INTRAVENOUS
Status: DISCONTINUED | OUTPATIENT
Start: 2024-11-30 | End: 2024-11-30 | Stop reason: SDUPTHER

## 2024-11-30 RX ORDER — SODIUM CHLORIDE 0.9 % (FLUSH) 0.9 %
5-40 SYRINGE (ML) INJECTION EVERY 12 HOURS SCHEDULED
Status: DISCONTINUED | OUTPATIENT
Start: 2024-11-30 | End: 2024-12-05 | Stop reason: HOSPADM

## 2024-11-30 RX ORDER — TRANEXAMIC ACID 10 MG/ML
INJECTION, SOLUTION INTRAVENOUS
Status: COMPLETED
Start: 2024-11-30 | End: 2024-11-30

## 2024-11-30 RX ORDER — ONDANSETRON 2 MG/ML
INJECTION INTRAMUSCULAR; INTRAVENOUS
Status: DISCONTINUED | OUTPATIENT
Start: 2024-11-30 | End: 2024-11-30 | Stop reason: SDUPTHER

## 2024-11-30 RX ORDER — ENOXAPARIN SODIUM 100 MG/ML
40 INJECTION SUBCUTANEOUS DAILY
Status: DISCONTINUED | OUTPATIENT
Start: 2024-12-01 | End: 2024-12-05

## 2024-11-30 RX ORDER — SODIUM CHLORIDE 0.9 % (FLUSH) 0.9 %
5-40 SYRINGE (ML) INJECTION EVERY 12 HOURS SCHEDULED
Status: DISCONTINUED | OUTPATIENT
Start: 2024-11-30 | End: 2024-11-30 | Stop reason: HOSPADM

## 2024-11-30 RX ORDER — DIPHENHYDRAMINE HYDROCHLORIDE 50 MG/ML
12.5 INJECTION INTRAMUSCULAR; INTRAVENOUS
Status: DISCONTINUED | OUTPATIENT
Start: 2024-11-30 | End: 2024-11-30 | Stop reason: HOSPADM

## 2024-11-30 RX ORDER — CEFAZOLIN SODIUM 1 G/3ML
INJECTION, POWDER, FOR SOLUTION INTRAMUSCULAR; INTRAVENOUS
Status: DISCONTINUED | OUTPATIENT
Start: 2024-11-30 | End: 2024-11-30 | Stop reason: SDUPTHER

## 2024-11-30 RX ORDER — VANCOMYCIN HYDROCHLORIDE 1 G/20ML
INJECTION, POWDER, LYOPHILIZED, FOR SOLUTION INTRAVENOUS PRN
Status: DISCONTINUED | OUTPATIENT
Start: 2024-11-30 | End: 2024-11-30 | Stop reason: ALTCHOICE

## 2024-11-30 RX ORDER — NALOXONE HYDROCHLORIDE 0.4 MG/ML
INJECTION, SOLUTION INTRAMUSCULAR; INTRAVENOUS; SUBCUTANEOUS PRN
Status: DISCONTINUED | OUTPATIENT
Start: 2024-11-30 | End: 2024-11-30 | Stop reason: HOSPADM

## 2024-11-30 RX ORDER — IPRATROPIUM BROMIDE AND ALBUTEROL SULFATE 2.5; .5 MG/3ML; MG/3ML
1 SOLUTION RESPIRATORY (INHALATION)
Status: DISCONTINUED | OUTPATIENT
Start: 2024-11-30 | End: 2024-11-30 | Stop reason: HOSPADM

## 2024-11-30 RX ORDER — FENTANYL CITRATE 50 UG/ML
INJECTION, SOLUTION INTRAMUSCULAR; INTRAVENOUS
Status: DISCONTINUED | OUTPATIENT
Start: 2024-11-30 | End: 2024-11-30 | Stop reason: SDUPTHER

## 2024-11-30 RX ORDER — TRANEXAMIC ACID 10 MG/ML
1000 INJECTION, SOLUTION INTRAVENOUS ONCE
Status: COMPLETED | OUTPATIENT
Start: 2024-11-30 | End: 2024-11-30

## 2024-11-30 RX ORDER — SODIUM CHLORIDE 0.9 % (FLUSH) 0.9 %
5-40 SYRINGE (ML) INJECTION PRN
Status: DISCONTINUED | OUTPATIENT
Start: 2024-11-30 | End: 2024-11-30 | Stop reason: HOSPADM

## 2024-11-30 RX ORDER — ONDANSETRON 2 MG/ML
4 INJECTION INTRAMUSCULAR; INTRAVENOUS
Status: DISCONTINUED | OUTPATIENT
Start: 2024-11-30 | End: 2024-11-30 | Stop reason: HOSPADM

## 2024-11-30 RX ORDER — ONDANSETRON 2 MG/ML
4 INJECTION INTRAMUSCULAR; INTRAVENOUS EVERY 6 HOURS PRN
Status: DISCONTINUED | OUTPATIENT
Start: 2024-11-30 | End: 2024-12-05 | Stop reason: HOSPADM

## 2024-11-30 RX ORDER — MORPHINE SULFATE 2 MG/ML
2 INJECTION, SOLUTION INTRAMUSCULAR; INTRAVENOUS
Status: DISCONTINUED | OUTPATIENT
Start: 2024-11-30 | End: 2024-12-05 | Stop reason: HOSPADM

## 2024-11-30 RX ORDER — SODIUM CHLORIDE 9 MG/ML
INJECTION, SOLUTION INTRAVENOUS
Status: DISCONTINUED | OUTPATIENT
Start: 2024-11-30 | End: 2024-11-30 | Stop reason: SDUPTHER

## 2024-11-30 RX ORDER — LORAZEPAM 2 MG/ML
2 INJECTION INTRAMUSCULAR ONCE
Status: COMPLETED | OUTPATIENT
Start: 2024-11-30 | End: 2024-11-30

## 2024-11-30 RX ORDER — DEXAMETHASONE SODIUM PHOSPHATE 4 MG/ML
INJECTION, SOLUTION INTRA-ARTICULAR; INTRALESIONAL; INTRAMUSCULAR; INTRAVENOUS; SOFT TISSUE
Status: DISCONTINUED | OUTPATIENT
Start: 2024-11-30 | End: 2024-11-30 | Stop reason: SDUPTHER

## 2024-11-30 RX ORDER — PROCHLORPERAZINE EDISYLATE 5 MG/ML
5 INJECTION INTRAMUSCULAR; INTRAVENOUS
Status: DISCONTINUED | OUTPATIENT
Start: 2024-11-30 | End: 2024-11-30 | Stop reason: HOSPADM

## 2024-11-30 RX ORDER — DEXTROSE MONOHYDRATE 100 MG/ML
INJECTION, SOLUTION INTRAVENOUS CONTINUOUS PRN
Status: DISCONTINUED | OUTPATIENT
Start: 2024-11-30 | End: 2024-11-30 | Stop reason: HOSPADM

## 2024-11-30 RX ORDER — VASOPRESSIN 20 U/ML
INJECTION PARENTERAL
Status: DISCONTINUED | OUTPATIENT
Start: 2024-11-30 | End: 2024-11-30 | Stop reason: SDUPTHER

## 2024-11-30 RX ORDER — OXYCODONE HYDROCHLORIDE 5 MG/1
10 TABLET ORAL EVERY 4 HOURS PRN
Status: DISCONTINUED | OUTPATIENT
Start: 2024-11-30 | End: 2024-12-05 | Stop reason: HOSPADM

## 2024-11-30 RX ORDER — SODIUM CHLORIDE 9 MG/ML
INJECTION, SOLUTION INTRAVENOUS PRN
Status: DISCONTINUED | OUTPATIENT
Start: 2024-11-30 | End: 2024-11-30 | Stop reason: HOSPADM

## 2024-11-30 RX ORDER — MIDAZOLAM HYDROCHLORIDE 2 MG/2ML
2 INJECTION, SOLUTION INTRAMUSCULAR; INTRAVENOUS ONCE
Status: DISCONTINUED | OUTPATIENT
Start: 2024-11-30 | End: 2024-11-30

## 2024-11-30 RX ORDER — SODIUM CHLORIDE 9 MG/ML
INJECTION, SOLUTION INTRAVENOUS PRN
Status: DISCONTINUED | OUTPATIENT
Start: 2024-11-30 | End: 2024-12-05 | Stop reason: HOSPADM

## 2024-11-30 RX ORDER — TRANEXAMIC ACID 10 MG/ML
1000 INJECTION, SOLUTION INTRAVENOUS
Status: COMPLETED | OUTPATIENT
Start: 2024-11-30 | End: 2024-11-30

## 2024-11-30 RX ORDER — PROPOFOL 10 MG/ML
INJECTION, EMULSION INTRAVENOUS
Status: DISCONTINUED | OUTPATIENT
Start: 2024-11-30 | End: 2024-11-30 | Stop reason: SDUPTHER

## 2024-11-30 RX ORDER — GLYCOPYRROLATE 0.2 MG/ML
INJECTION INTRAMUSCULAR; INTRAVENOUS
Status: DISCONTINUED | OUTPATIENT
Start: 2024-11-30 | End: 2024-11-30 | Stop reason: SDUPTHER

## 2024-11-30 RX ORDER — MIDAZOLAM HYDROCHLORIDE 1 MG/ML
INJECTION, SOLUTION INTRAMUSCULAR; INTRAVENOUS
Status: DISCONTINUED | OUTPATIENT
Start: 2024-11-30 | End: 2024-11-30 | Stop reason: SDUPTHER

## 2024-11-30 RX ORDER — ROCURONIUM BROMIDE 10 MG/ML
INJECTION, SOLUTION INTRAVENOUS
Status: DISCONTINUED | OUTPATIENT
Start: 2024-11-30 | End: 2024-11-30 | Stop reason: SDUPTHER

## 2024-11-30 RX ADMIN — LIDOCAINE HYDROCHLORIDE 5 ML: 10 INJECTION, SOLUTION EPIDURAL; INFILTRATION; INTRACAUDAL; PERINEURAL at 16:03

## 2024-11-30 RX ADMIN — TRANEXAMIC ACID 1000 MG: 10 INJECTION, SOLUTION INTRAVENOUS at 12:42

## 2024-11-30 RX ADMIN — PHENYLEPHRINE HYDROCHLORIDE 300 MCG: 10 INJECTION INTRAVENOUS at 11:41

## 2024-11-30 RX ADMIN — VASOPRESSIN 2 UNITS: 20 INJECTION INTRAVENOUS at 12:17

## 2024-11-30 RX ADMIN — GLYCOPYRROLATE 0.4 MG: 0.2 INJECTION, SOLUTION INTRAMUSCULAR; INTRAVENOUS at 11:27

## 2024-11-30 RX ADMIN — TRANEXAMIC ACID 1000 MG: 10 INJECTION, SOLUTION INTRAVENOUS at 09:34

## 2024-11-30 RX ADMIN — PHENYLEPHRINE HYDROCHLORIDE 200 MCG: 10 INJECTION INTRAVENOUS at 11:26

## 2024-11-30 RX ADMIN — TIZANIDINE 2 MG: 4 TABLET ORAL at 20:49

## 2024-11-30 RX ADMIN — PROPOFOL 80 MG: 10 INJECTION, EMULSION INTRAVENOUS at 09:05

## 2024-11-30 RX ADMIN — FENTANYL CITRATE 50 MCG: 50 INJECTION INTRAMUSCULAR; INTRAVENOUS at 16:05

## 2024-11-30 RX ADMIN — PHENYLEPHRINE HYDROCHLORIDE 200 MCG: 10 INJECTION INTRAVENOUS at 11:30

## 2024-11-30 RX ADMIN — MORPHINE SULFATE 4 MG: 4 INJECTION, SOLUTION INTRAMUSCULAR; INTRAVENOUS at 03:49

## 2024-11-30 RX ADMIN — FENTANYL CITRATE 50 MCG: 50 INJECTION, SOLUTION INTRAMUSCULAR; INTRAVENOUS at 11:29

## 2024-11-30 RX ADMIN — LIDOCAINE HYDROCHLORIDE 100 MG: 20 INJECTION, SOLUTION EPIDURAL; INFILTRATION; INTRACAUDAL; PERINEURAL at 09:05

## 2024-11-30 RX ADMIN — OXYCODONE HYDROCHLORIDE AND ACETAMINOPHEN 1 TABLET: 5; 325 TABLET ORAL at 06:22

## 2024-11-30 RX ADMIN — PHENYLEPHRINE HYDROCHLORIDE 200 MCG: 10 INJECTION INTRAVENOUS at 09:09

## 2024-11-30 RX ADMIN — PHENYLEPHRINE HYDROCHLORIDE 300 MCG: 10 INJECTION INTRAVENOUS at 09:08

## 2024-11-30 RX ADMIN — PHENYLEPHRINE HYDROCHLORIDE 300 MCG: 10 INJECTION INTRAVENOUS at 09:37

## 2024-11-30 RX ADMIN — CYANOCOBALAMIN TAB 1000 MCG 1000 MCG: 1000 TAB at 17:30

## 2024-11-30 RX ADMIN — OXYCODONE 5 MG: 5 TABLET ORAL at 18:16

## 2024-11-30 RX ADMIN — MIDAZOLAM 1 MG: 1 INJECTION INTRAMUSCULAR; INTRAVENOUS at 09:24

## 2024-11-30 RX ADMIN — PHENYLEPHRINE HYDROCHLORIDE 200 MCG: 10 INJECTION INTRAVENOUS at 11:43

## 2024-11-30 RX ADMIN — PHENYLEPHRINE HYDROCHLORIDE 200 MCG: 10 INJECTION INTRAVENOUS at 09:57

## 2024-11-30 RX ADMIN — PHENYLEPHRINE HYDROCHLORIDE 200 MCG: 10 INJECTION INTRAVENOUS at 10:18

## 2024-11-30 RX ADMIN — SODIUM CHLORIDE, PRESERVATIVE FREE 10 ML: 5 INJECTION INTRAVENOUS at 17:24

## 2024-11-30 RX ADMIN — PHENYLEPHRINE HYDROCHLORIDE 200 MCG: 10 INJECTION INTRAVENOUS at 10:54

## 2024-11-30 RX ADMIN — PHENYLEPHRINE HYDROCHLORIDE 200 MCG: 10 INJECTION INTRAVENOUS at 09:54

## 2024-11-30 RX ADMIN — SODIUM CHLORIDE: 9 INJECTION, SOLUTION INTRAVENOUS at 08:56

## 2024-11-30 RX ADMIN — PHENYLEPHRINE HYDROCHLORIDE 300 MCG: 10 INJECTION INTRAVENOUS at 10:04

## 2024-11-30 RX ADMIN — PHENYLEPHRINE HYDROCHLORIDE 200 MCG: 10 INJECTION INTRAVENOUS at 09:11

## 2024-11-30 RX ADMIN — PROPOFOL 30 MG: 10 INJECTION, EMULSION INTRAVENOUS at 11:57

## 2024-11-30 RX ADMIN — PHENYLEPHRINE HYDROCHLORIDE 300 MCG: 10 INJECTION INTRAVENOUS at 09:27

## 2024-11-30 RX ADMIN — PHENYLEPHRINE HYDROCHLORIDE 300 MCG: 10 INJECTION INTRAVENOUS at 11:03

## 2024-11-30 RX ADMIN — ROCURONIUM BROMIDE 50 MG: 10 INJECTION, SOLUTION INTRAVENOUS at 09:06

## 2024-11-30 RX ADMIN — LORAZEPAM 2 MG: 2 INJECTION INTRAMUSCULAR; INTRAVENOUS at 16:05

## 2024-11-30 RX ADMIN — SODIUM CHLORIDE: 9 INJECTION, SOLUTION INTRAVENOUS at 09:59

## 2024-11-30 RX ADMIN — PHENYLEPHRINE HYDROCHLORIDE 300 MCG: 10 INJECTION INTRAVENOUS at 10:42

## 2024-11-30 RX ADMIN — FENTANYL CITRATE 50 MCG: 50 INJECTION, SOLUTION INTRAMUSCULAR; INTRAVENOUS at 09:43

## 2024-11-30 RX ADMIN — PHENYLEPHRINE HYDROCHLORIDE 300 MCG: 10 INJECTION INTRAVENOUS at 11:35

## 2024-11-30 RX ADMIN — PHENYLEPHRINE HYDROCHLORIDE 200 MCG: 10 INJECTION INTRAVENOUS at 10:57

## 2024-11-30 RX ADMIN — SODIUM CHLORIDE, PRESERVATIVE FREE 10 ML: 5 INJECTION INTRAVENOUS at 20:48

## 2024-11-30 RX ADMIN — MIDAZOLAM 1 MG: 1 INJECTION INTRAMUSCULAR; INTRAVENOUS at 09:00

## 2024-11-30 RX ADMIN — CEFAZOLIN 2 G: 1 INJECTION, POWDER, FOR SOLUTION INTRAMUSCULAR; INTRAVENOUS at 09:28

## 2024-11-30 RX ADMIN — DEXAMETHASONE SODIUM PHOSPHATE 4 MG: 4 INJECTION, SOLUTION INTRAMUSCULAR; INTRAVENOUS at 11:27

## 2024-11-30 RX ADMIN — WATER 2000 MG: 1 INJECTION INTRAMUSCULAR; INTRAVENOUS; SUBCUTANEOUS at 17:22

## 2024-11-30 RX ADMIN — Medication 3 MG: at 11:27

## 2024-11-30 RX ADMIN — PHENYLEPHRINE HYDROCHLORIDE 100 MCG: 10 INJECTION INTRAVENOUS at 10:49

## 2024-11-30 RX ADMIN — PHENYLEPHRINE HYDROCHLORIDE 300 MCG: 10 INJECTION INTRAVENOUS at 11:59

## 2024-11-30 RX ADMIN — PHENYLEPHRINE HYDROCHLORIDE 200 MCG: 10 INJECTION INTRAVENOUS at 12:11

## 2024-11-30 RX ADMIN — ONDANSETRON 4 MG: 2 INJECTION INTRAMUSCULAR; INTRAVENOUS at 11:27

## 2024-11-30 RX ADMIN — MORPHINE SULFATE 2 MG: 2 INJECTION, SOLUTION INTRAMUSCULAR; INTRAVENOUS at 21:06

## 2024-11-30 RX ADMIN — OXYCODONE 10 MG: 5 TABLET ORAL at 23:11

## 2024-11-30 ASSESSMENT — PAIN SCALES - GENERAL
PAINLEVEL_OUTOF10: 8
PAINLEVEL_OUTOF10: 6
PAINLEVEL_OUTOF10: 8
PAINLEVEL_OUTOF10: 10
PAINLEVEL_OUTOF10: 8

## 2024-11-30 ASSESSMENT — PAIN DESCRIPTION - LOCATION
LOCATION: HIP

## 2024-11-30 ASSESSMENT — PAIN - FUNCTIONAL ASSESSMENT
PAIN_FUNCTIONAL_ASSESSMENT: ACTIVITIES ARE NOT PREVENTED
PAIN_FUNCTIONAL_ASSESSMENT: 0-10
PAIN_FUNCTIONAL_ASSESSMENT: ACTIVITIES ARE NOT PREVENTED
PAIN_FUNCTIONAL_ASSESSMENT: ACTIVITIES ARE NOT PREVENTED
PAIN_FUNCTIONAL_ASSESSMENT: PREVENTS OR INTERFERES WITH MANY ACTIVE NOT PASSIVE ACTIVITIES
PAIN_FUNCTIONAL_ASSESSMENT: PREVENTS OR INTERFERES WITH ALL ACTIVE AND SOME PASSIVE ACTIVITIES
PAIN_FUNCTIONAL_ASSESSMENT: 0-10
PAIN_FUNCTIONAL_ASSESSMENT: 0-10

## 2024-11-30 ASSESSMENT — PAIN DESCRIPTION - ORIENTATION
ORIENTATION: LEFT

## 2024-11-30 ASSESSMENT — PAIN DESCRIPTION - DESCRIPTORS
DESCRIPTORS: ACHING;DULL;DISCOMFORT
DESCRIPTORS: ACHING
DESCRIPTORS: ACHING
DESCRIPTORS: ACHING;DISCOMFORT;DULL
DESCRIPTORS: ACHING;DULL;DISCOMFORT
DESCRIPTORS: ACHING;SORE

## 2024-11-30 ASSESSMENT — PAIN DESCRIPTION - PAIN TYPE: TYPE: ACUTE PAIN

## 2024-11-30 NOTE — PROCEDURES
PROCEDURE NOTE  Date: 11/30/2024   Name: Mike Morrell Jr.  YOB: 1950    Chest Tube Insertion    Date/Time: 11/30/2024 4:32 PM    Performed by: Abilio Dukes DO  Authorized by: Abilio Dukes DO  Consent: Verbal consent obtained. Written consent obtained.  Risks and benefits: risks, benefits and alternatives were discussed  Consent given by: patient  Patient understanding: patient states understanding of the procedure being performed  Patient consent: the patient's understanding of the procedure matches consent given  Procedure consent: procedure consent matches procedure scheduled  Relevant documents: relevant documents present and verified  Test results: test results available and properly labeled  Site marked: the operative site was marked  Imaging studies: imaging studies available  Required items: required blood products, implants, devices, and special equipment available  Patient identity confirmed: verbally with patient and arm band  Indications: pneumothorax    Sedation:  Patient sedated: yes  Sedation type: moderate (conscious) sedation  Sedatives: see MAR for details  Analgesia: fentanyl and see MAR for details    Anesthesia: local infiltration    Anesthesia:  Local Anesthetic: lidocaine 1% without epinephrine  Anesthetic total: 10 mL  Preparation: sterile field established and skin prepped with chlorhexidine  Placement location: left lateral  Scalpel size: 10  Tube size: 24 Uzbek  Dissection instrument: Nicky clamp  Ultrasound guidance: no  Tension pneumothorax heard: no  Tube connected to: suction  Drainage characteristics: clear  Suture material: 0 silk  Dressing: Xeroform gauze and 4x4 sterile gauze  Patient tolerance: patient tolerated the procedure well with no immediate complications

## 2024-11-30 NOTE — PLAN OF CARE
Problem: ABCDS Injury Assessment  Goal: Absence of physical injury  11/30/2024 0430 by Mirlande Huynh RN  Outcome: Progressing     Problem: Skin/Tissue Integrity  Goal: Absence of new skin breakdown  Description: 1.  Monitor for areas of redness and/or skin breakdown  2.  Assess vascular access sites hourly  3.  Every 4-6 hours minimum:  Change oxygen saturation probe site  4.  Every 4-6 hours:  If on nasal continuous positive airway pressure, respiratory therapy assess nares and determine need for appliance change or resting period.  11/30/2024 0430 by Mirlande Huynh RN  Outcome: Progressing     Problem: Discharge Planning  Goal: Discharge to home or other facility with appropriate resources  11/30/2024 0430 by Mirlande Huynh RN  Outcome: Progressing     Problem: Safety - Adult  Goal: Free from fall injury  11/30/2024 0430 by Mirlande Huynh RN  Outcome: Progressing

## 2024-11-30 NOTE — PLAN OF CARE
Problem: ABCDS Injury Assessment  Goal: Absence of physical injury  11/29/2024 2047 by Frank Tapia RN  Outcome: Progressing  11/29/2024 1007 by Mikayla Barahona RN  Outcome: Progressing     Problem: Skin/Tissue Integrity  Goal: Absence of new skin breakdown  Description: 1.  Monitor for areas of redness and/or skin breakdown  2.  Assess vascular access sites hourly  3.  Every 4-6 hours minimum:  Change oxygen saturation probe site  4.  Every 4-6 hours:  If on nasal continuous positive airway pressure, respiratory therapy assess nares and determine need for appliance change or resting period.  11/29/2024 2047 by Frank Tapia RN  Outcome: Progressing  11/29/2024 1007 by Mikayla Barahona RN  Outcome: Progressing     Problem: Discharge Planning  Goal: Discharge to home or other facility with appropriate resources  11/29/2024 2047 by Frank Tapia RN  Outcome: Progressing  11/29/2024 1007 by Mikayla Barahona RN  Outcome: Progressing     Problem: Safety - Adult  Goal: Free from fall injury  11/29/2024 2047 by Frank Tapia RN  Outcome: Progressing  11/29/2024 1007 by Mikayla Barahona RN  Outcome: Progressing     Problem: Pain  Goal: Verbalizes/displays adequate comfort level or baseline comfort level  11/29/2024 2047 by Frank Tapia RN  Outcome: Progressing  11/29/2024 1007 by Mikayla Barahona RN  Outcome: Progressing     Problem: Respiratory - Adult  Goal: Achieves optimal ventilation and oxygenation  11/29/2024 2047 by Frank Tapia RN  Outcome: Progressing  11/29/2024 1007 by Mikayla Barahona RN  Outcome: Progressing     Problem: Skin/Tissue Integrity - Adult  Goal: Skin integrity remains intact  11/29/2024 2047 by Frank Tapia RN  Outcome: Progressing  11/29/2024 1007 by Mikayla Barahona RN  Outcome: Progressing     Problem: Musculoskeletal - Adult  Goal: Return mobility to safest level of function  11/29/2024 2047 by Frank Tapia RN  Outcome: Progressing  11/29/2024 1007

## 2024-11-30 NOTE — ANESTHESIA POSTPROCEDURE EVALUATION
Department of Anesthesiology  Postprocedure Note    Patient: Mike Morrell Jr.  MRN: [id number]  YOB: 1950  Date of evaluation: 11/30/2024    Procedure Summary       Date: 11/30/24 Room / Location: [address]    Anesthesia Start: 0857 Anesthesia Stop: 1212    Procedure: LEFT HIP HEMIARTHROPLASTY (Left: Hip) Diagnosis:       Closed fracture of neck of left femur, initial encounter ([facility])      (Closed fracture of neck of left femur, initial encounter ([facility]) [S72.002A])    Surgeons: César Miller DO Responsible Provider:     Anesthesia Type: General ASA Status: 3            Anesthesia Type: General    Sneha Phase I: Sneha Score: 10    Sneha Phase II:      Anesthesia Post Evaluation    Patient location during evaluation: PACU  Patient participation: complete - patient participated  Level of consciousness: awake  Pain score: 0  Airway patency: patent  Nausea & Vomiting: no vomiting and no nausea  Cardiovascular status: blood pressure returned to baseline and hemodynamically stable  Respiratory status: acceptable, nonlabored ventilation, spontaneous ventilation and face mask  Hydration status: stable  Pain management: satisfactory to patient        No notable events documented.

## 2024-11-30 NOTE — OP NOTE
neck, I noted that there was a large piece of the inferior neck which contained the lesser trochanter which had fractured off of the remaining proximal femur.  I felt that this was easily repairable and would ultimately help with maintaining good stability following the procedure.  For these reasons, I held the piece back into its anatomic position securely with a reduction forceps.  I then used a cable passer to pass a cerclage cable around the piece.  The cable was then tensioned and crimped.  The process was repeated with a second cable.  Upon completion of this, there was excellent anatomic reduction of the lesser trochanter.  I was then able to visualize the femoral head.  This was removed using a threaded Steinmann pin.  The wound was copiously irrigated and I palpated within the patient's femoral acetabulum to ensure that there were no bony fragments or debris remaining within the hip joint.  The femoral head was taken to the back table and sized.  I then took a trial femoral head component and placed this within the native acetabulum.  After trialing several sizes, I determined that a size 48 had sufficient shuck and appropriate fill within the acetabulum.    The left leg was then brought into flexion and external rotation and dropped into a sterile bag at the side table.  Retractors were placed and I began the process of preparing the femoral canal for approach insertion.  A box chisel was placed followed by a lateralizing reamer.  I then sequentially broached until I reached a size 9 mm extended stem.  A trial neck was placed as well as a trial femoral head and the hip was then reduced.  I took the left leg through range of motion and determined that the sizes yielded adequate range of motion without any evidence of instability.  There was an appropriate shuck.  The left leg was noted to be equal in leg length to the contralateral side.  The hip was then dislocated, trial instrumentation was then removed

## 2024-11-30 NOTE — ANESTHESIA PRE PROCEDURE
Department of Anesthesiology  Preprocedure Note       Name:  Mike Morrell Jr.   Age:  74 y.o.  :  1950                                          MRN:  [id number]         Date:  2024      Surgeon: Surgeon(s):  César Miller DO    Procedure: Procedure(s):  LEFT HIP HEMIARTHROPLASTY    +++SEUN+++    Medications prior to admission:   Prior to Admission medications    Medication Sig Start Date End Date Taking? Authorizing Provider   diazePAM (VALIUM) 5 MG tablet 1 tablet  in the morning and 1 tablet at noon and 1 tablet in the evening. 24  Yes ProviderCecelia MD   carisoprodol (SOMA) 350 MG tablet Take 1 tablet by mouth 4 times daily as needed for Muscle spasms.   Yes ProviderCecelia MD   HYDROcodone-acetaminophen (NORCO) 5-325 MG per tablet Take 1 tablet by mouth every 6 hours as needed for Pain.   Yes ProviderCecelia MD   amLODIPine (NORVASC) 5 MG tablet Take 1 tablet by mouth daily 21  Yes ProviderCecelia MD   lisinopril (PRINIVIL;ZESTRIL) 20 MG tablet Take 1 tablet by mouth daily 21  Yes ProviderCecelia MD   Ipratropium-Albuterol (COMBIVENT IN) Inhale into the lungs as needed   Yes ProviderCecelia MD   nitroGLYCERIN (NITROSTAT) 0.4 MG SL tablet Place 1 tablet under the tongue every 5 minutes as needed for Chest pain. 14  Yes Dwayne Hawley MD   tamsulosin (FLOMAX) 0.4 MG capsule Take 1 capsule by mouth daily   Yes ProviderCecelia MD       Current medications:    Current Facility-Administered Medications   Medication Dose Route Frequency Provider Last Rate Last Admin   • albuterol (PROVENTIL) (2.5 MG/3ML) 0.083% nebulizer solution 2.5 mg  2.5 mg Nebulization Q6H PRN Abilio Dukes DO       • ferrous sulfate (IRON 325) tablet 325 mg  325 mg Oral Daily with breakfast El Ani Rodriguez MD       • oxyCODONE-acetaminophen (PERCOCET) 5-325 MG per tablet 1 tablet  1 tablet Oral Q6H PRN Gus Uriarte DO   1 tablet at 24 0622   • folic

## 2024-12-01 ENCOUNTER — APPOINTMENT (OUTPATIENT)
Dept: GENERAL RADIOLOGY | Age: 74
DRG: 521 | End: 2024-12-01
Payer: MEDICARE

## 2024-12-01 LAB
ALBUMIN SERPL-MCNC: 3 G/DL (ref 3.5–5.2)
ALP SERPL-CCNC: 55 U/L (ref 40–129)
ALT SERPL-CCNC: 11 U/L (ref 0–40)
ANION GAP SERPL CALCULATED.3IONS-SCNC: 6 MMOL/L (ref 7–16)
AST SERPL-CCNC: 33 U/L (ref 0–39)
BASOPHILS # BLD: 0 K/UL (ref 0–0.2)
BASOPHILS NFR BLD: 0 % (ref 0–2)
BILIRUB SERPL-MCNC: 0.3 MG/DL (ref 0–1.2)
BUN SERPL-MCNC: 18 MG/DL (ref 6–23)
CALCIUM SERPL-MCNC: 8.1 MG/DL (ref 8.6–10.2)
CHLORIDE SERPL-SCNC: 98 MMOL/L (ref 98–107)
CO2 SERPL-SCNC: 23 MMOL/L (ref 22–29)
CREAT SERPL-MCNC: 1.2 MG/DL (ref 0.7–1.2)
EOSINOPHIL # BLD: 0.02 K/UL (ref 0.05–0.5)
EOSINOPHILS RELATIVE PERCENT: 0 % (ref 0–6)
ERYTHROCYTE [DISTWIDTH] IN BLOOD BY AUTOMATED COUNT: 12.4 % (ref 11.5–15)
GFR, ESTIMATED: 66 ML/MIN/1.73M2
GLUCOSE SERPL-MCNC: 102 MG/DL (ref 74–99)
HCT VFR BLD AUTO: 24.8 % (ref 37–54)
HGB BLD-MCNC: 8.3 G/DL (ref 12.5–16.5)
IMM GRANULOCYTES # BLD AUTO: 0.03 K/UL (ref 0–0.58)
IMM GRANULOCYTES NFR BLD: 0 % (ref 0–5)
LYMPHOCYTES NFR BLD: 0.46 K/UL (ref 1.5–4)
LYMPHOCYTES RELATIVE PERCENT: 6 % (ref 20–42)
MCH RBC QN AUTO: 37.6 PG (ref 26–35)
MCHC RBC AUTO-ENTMCNC: 33.5 G/DL (ref 32–34.5)
MCV RBC AUTO: 112.2 FL (ref 80–99.9)
MONOCYTES NFR BLD: 0.93 K/UL (ref 0.1–0.95)
MONOCYTES NFR BLD: 13 % (ref 2–12)
NEUTROPHILS NFR BLD: 80 % (ref 43–80)
NEUTS SEG NFR BLD: 5.91 K/UL (ref 1.8–7.3)
PLATELET # BLD AUTO: 195 K/UL (ref 130–450)
PMV BLD AUTO: 9.4 FL (ref 7–12)
POTASSIUM SERPL-SCNC: 5.1 MMOL/L (ref 3.5–5)
PROT SERPL-MCNC: 5.3 G/DL (ref 6.4–8.3)
RBC # BLD AUTO: 2.21 M/UL (ref 3.8–5.8)
RBC # BLD: ABNORMAL 10*6/UL
SODIUM SERPL-SCNC: 127 MMOL/L (ref 132–146)
WBC # BLD: ABNORMAL 10*3/UL
WBC OTHER # BLD: 7.4 K/UL (ref 4.5–11.5)

## 2024-12-01 PROCEDURE — 85025 COMPLETE CBC W/AUTO DIFF WBC: CPT

## 2024-12-01 PROCEDURE — 2060000000 HC ICU INTERMEDIATE R&B

## 2024-12-01 PROCEDURE — 6360000002 HC RX W HCPCS: Performed by: GENERAL ACUTE CARE HOSPITAL

## 2024-12-01 PROCEDURE — 2700000000 HC OXYGEN THERAPY PER DAY

## 2024-12-01 PROCEDURE — 6370000000 HC RX 637 (ALT 250 FOR IP): Performed by: INTERNAL MEDICINE

## 2024-12-01 PROCEDURE — 71045 X-RAY EXAM CHEST 1 VIEW: CPT

## 2024-12-01 PROCEDURE — 6370000000 HC RX 637 (ALT 250 FOR IP): Performed by: STUDENT IN AN ORGANIZED HEALTH CARE EDUCATION/TRAINING PROGRAM

## 2024-12-01 PROCEDURE — 2580000003 HC RX 258: Performed by: GENERAL ACUTE CARE HOSPITAL

## 2024-12-01 PROCEDURE — 80053 COMPREHEN METABOLIC PANEL: CPT

## 2024-12-01 PROCEDURE — 6370000000 HC RX 637 (ALT 250 FOR IP): Performed by: HOSPITALIST

## 2024-12-01 PROCEDURE — 99232 SBSQ HOSP IP/OBS MODERATE 35: CPT | Performed by: INTERNAL MEDICINE

## 2024-12-01 PROCEDURE — 6370000000 HC RX 637 (ALT 250 FOR IP): Performed by: GENERAL ACUTE CARE HOSPITAL

## 2024-12-01 RX ORDER — SODIUM CHLORIDE 1 G/1
1 TABLET ORAL
Status: DISCONTINUED | OUTPATIENT
Start: 2024-12-01 | End: 2024-12-04

## 2024-12-01 RX ADMIN — Medication 3 MG: at 20:07

## 2024-12-01 RX ADMIN — WATER 2000 MG: 1 INJECTION INTRAMUSCULAR; INTRAVENOUS; SUBCUTANEOUS at 01:01

## 2024-12-01 RX ADMIN — MORPHINE SULFATE 2 MG: 2 INJECTION, SOLUTION INTRAMUSCULAR; INTRAVENOUS at 11:29

## 2024-12-01 RX ADMIN — SODIUM CHLORIDE, PRESERVATIVE FREE 10 ML: 5 INJECTION INTRAVENOUS at 08:55

## 2024-12-01 RX ADMIN — ENOXAPARIN SODIUM 40 MG: 100 INJECTION SUBCUTANEOUS at 08:56

## 2024-12-01 RX ADMIN — MORPHINE SULFATE 4 MG: 4 INJECTION, SOLUTION INTRAMUSCULAR; INTRAVENOUS at 20:08

## 2024-12-01 RX ADMIN — LORAZEPAM 1 MG: 1 TABLET ORAL at 14:20

## 2024-12-01 RX ADMIN — MORPHINE SULFATE 2 MG: 2 INJECTION, SOLUTION INTRAMUSCULAR; INTRAVENOUS at 14:20

## 2024-12-01 RX ADMIN — AMLODIPINE BESYLATE 5 MG: 5 TABLET ORAL at 08:54

## 2024-12-01 RX ADMIN — TIZANIDINE 2 MG: 4 TABLET ORAL at 14:16

## 2024-12-01 RX ADMIN — Medication 1 G: at 16:39

## 2024-12-01 RX ADMIN — FERROUS SULFATE TAB 325 MG (65 MG ELEMENTAL FE) 325 MG: 325 (65 FE) TAB at 08:53

## 2024-12-01 RX ADMIN — Medication 1 G: at 11:29

## 2024-12-01 RX ADMIN — TIZANIDINE 2 MG: 4 TABLET ORAL at 20:08

## 2024-12-01 RX ADMIN — TAMSULOSIN HYDROCHLORIDE 0.4 MG: 0.4 CAPSULE ORAL at 08:54

## 2024-12-01 RX ADMIN — MORPHINE SULFATE 2 MG: 2 INJECTION, SOLUTION INTRAMUSCULAR; INTRAVENOUS at 06:39

## 2024-12-01 RX ADMIN — Medication 1 G: at 08:54

## 2024-12-01 RX ADMIN — SODIUM CHLORIDE, PRESERVATIVE FREE 10 ML: 5 INJECTION INTRAVENOUS at 20:08

## 2024-12-01 RX ADMIN — MORPHINE SULFATE 2 MG: 2 INJECTION, SOLUTION INTRAMUSCULAR; INTRAVENOUS at 16:39

## 2024-12-01 RX ADMIN — MORPHINE SULFATE 2 MG: 2 INJECTION, SOLUTION INTRAMUSCULAR; INTRAVENOUS at 01:05

## 2024-12-01 RX ADMIN — TIZANIDINE 2 MG: 4 TABLET ORAL at 08:53

## 2024-12-01 RX ADMIN — POLYETHYLENE GLYCOL 3350 17 G: 17 POWDER, FOR SOLUTION ORAL at 08:56

## 2024-12-01 RX ADMIN — OXYCODONE 10 MG: 5 TABLET ORAL at 03:07

## 2024-12-01 RX ADMIN — Medication 100 MG: at 08:54

## 2024-12-01 RX ADMIN — CYANOCOBALAMIN TAB 1000 MCG 1000 MCG: 1000 TAB at 08:53

## 2024-12-01 RX ADMIN — FOLIC ACID 1 MG: 1 TABLET ORAL at 08:54

## 2024-12-01 ASSESSMENT — PAIN DESCRIPTION - ORIENTATION
ORIENTATION: LEFT

## 2024-12-01 ASSESSMENT — PAIN - FUNCTIONAL ASSESSMENT: PAIN_FUNCTIONAL_ASSESSMENT: PREVENTS OR INTERFERES SOME ACTIVE ACTIVITIES AND ADLS

## 2024-12-01 ASSESSMENT — PAIN SCALES - GENERAL
PAINLEVEL_OUTOF10: 8
PAINLEVEL_OUTOF10: 9
PAINLEVEL_OUTOF10: 0
PAINLEVEL_OUTOF10: 8

## 2024-12-01 ASSESSMENT — PAIN DESCRIPTION - PAIN TYPE: TYPE: ACUTE PAIN;SURGICAL PAIN

## 2024-12-01 ASSESSMENT — PAIN DESCRIPTION - FREQUENCY: FREQUENCY: CONTINUOUS

## 2024-12-01 ASSESSMENT — PAIN DESCRIPTION - DESCRIPTORS
DESCRIPTORS: ACHING;DULL;DISCOMFORT
DESCRIPTORS: ACHING;DULL;JABBING
DESCRIPTORS: SPASM;ACHING;THROBBING

## 2024-12-01 ASSESSMENT — PAIN DESCRIPTION - ONSET: ONSET: ON-GOING

## 2024-12-01 ASSESSMENT — PAIN DESCRIPTION - LOCATION
LOCATION: HIP
LOCATION: HIP;LEG

## 2024-12-02 ENCOUNTER — APPOINTMENT (OUTPATIENT)
Dept: GENERAL RADIOLOGY | Age: 74
DRG: 521 | End: 2024-12-02
Payer: MEDICARE

## 2024-12-02 LAB
ALBUMIN SERPL-MCNC: 2.8 G/DL (ref 3.5–5.2)
ALBUMIN SERPL-MCNC: 2.9 G/DL (ref 3.5–4.7)
ALP SERPL-CCNC: 54 U/L (ref 40–129)
ALPHA1 GLOB SERPL ELPH-MCNC: 0.4 G/DL (ref 0.2–0.4)
ALPHA2 GLOB SERPL ELPH-MCNC: 0.7 G/DL (ref 0.5–1)
ALT SERPL-CCNC: 7 U/L (ref 0–40)
ANION GAP SERPL CALCULATED.3IONS-SCNC: 8 MMOL/L (ref 7–16)
AST SERPL-CCNC: 27 U/L (ref 0–39)
B-GLOBULIN SERPL ELPH-MCNC: 0.7 G/DL (ref 0.8–1.3)
BILIRUB SERPL-MCNC: 0.3 MG/DL (ref 0–1.2)
BUN SERPL-MCNC: 16 MG/DL (ref 6–23)
CALCIUM SERPL-MCNC: 7.8 MG/DL (ref 8.6–10.2)
CHLORIDE SERPL-SCNC: 100 MMOL/L (ref 98–107)
CO2 SERPL-SCNC: 24 MMOL/L (ref 22–29)
CREAT SERPL-MCNC: 0.9 MG/DL (ref 0.7–1.2)
GAMMA GLOB SERPL ELPH-MCNC: 0.7 G/DL (ref 0.7–1.6)
GFR, ESTIMATED: 85 ML/MIN/1.73M2
GLUCOSE SERPL-MCNC: 108 MG/DL (ref 74–99)
PATHOLOGIST: ABNORMAL
POTASSIUM SERPL-SCNC: 4.8 MMOL/L (ref 3.5–5)
PROT PATTERN SERPL ELPH-IMP: ABNORMAL
PROT SERPL-MCNC: 5.1 G/DL (ref 6.4–8.3)
PROT SERPL-MCNC: 5.4 G/DL (ref 6.4–8.3)
SODIUM SERPL-SCNC: 132 MMOL/L (ref 132–146)

## 2024-12-02 PROCEDURE — 6370000000 HC RX 637 (ALT 250 FOR IP): Performed by: INTERNAL MEDICINE

## 2024-12-02 PROCEDURE — 6370000000 HC RX 637 (ALT 250 FOR IP): Performed by: HOSPITALIST

## 2024-12-02 PROCEDURE — 6360000002 HC RX W HCPCS: Performed by: GENERAL ACUTE CARE HOSPITAL

## 2024-12-02 PROCEDURE — 97161 PT EVAL LOW COMPLEX 20 MIN: CPT

## 2024-12-02 PROCEDURE — 2580000003 HC RX 258: Performed by: GENERAL ACUTE CARE HOSPITAL

## 2024-12-02 PROCEDURE — 36415 COLL VENOUS BLD VENIPUNCTURE: CPT

## 2024-12-02 PROCEDURE — 6370000000 HC RX 637 (ALT 250 FOR IP): Performed by: GENERAL ACUTE CARE HOSPITAL

## 2024-12-02 PROCEDURE — 97165 OT EVAL LOW COMPLEX 30 MIN: CPT

## 2024-12-02 PROCEDURE — 6370000000 HC RX 637 (ALT 250 FOR IP): Performed by: STUDENT IN AN ORGANIZED HEALTH CARE EDUCATION/TRAINING PROGRAM

## 2024-12-02 PROCEDURE — 80053 COMPREHEN METABOLIC PANEL: CPT

## 2024-12-02 PROCEDURE — 71045 X-RAY EXAM CHEST 1 VIEW: CPT

## 2024-12-02 PROCEDURE — 97535 SELF CARE MNGMENT TRAINING: CPT

## 2024-12-02 PROCEDURE — 2060000000 HC ICU INTERMEDIATE R&B

## 2024-12-02 PROCEDURE — 97530 THERAPEUTIC ACTIVITIES: CPT

## 2024-12-02 RX ADMIN — TIZANIDINE 2 MG: 4 TABLET ORAL at 08:15

## 2024-12-02 RX ADMIN — ENOXAPARIN SODIUM 40 MG: 100 INJECTION SUBCUTANEOUS at 08:17

## 2024-12-02 RX ADMIN — Medication 3 MG: at 20:03

## 2024-12-02 RX ADMIN — MORPHINE SULFATE 2 MG: 2 INJECTION, SOLUTION INTRAMUSCULAR; INTRAVENOUS at 00:19

## 2024-12-02 RX ADMIN — FERROUS SULFATE TAB 325 MG (65 MG ELEMENTAL FE) 325 MG: 325 (65 FE) TAB at 08:16

## 2024-12-02 RX ADMIN — POLYETHYLENE GLYCOL 3350 17 G: 17 POWDER, FOR SOLUTION ORAL at 08:16

## 2024-12-02 RX ADMIN — FOLIC ACID 1 MG: 1 TABLET ORAL at 08:16

## 2024-12-02 RX ADMIN — Medication 1 G: at 16:08

## 2024-12-02 RX ADMIN — TIZANIDINE 2 MG: 4 TABLET ORAL at 14:22

## 2024-12-02 RX ADMIN — OXYCODONE 10 MG: 5 TABLET ORAL at 05:56

## 2024-12-02 RX ADMIN — MORPHINE SULFATE 4 MG: 4 INJECTION, SOLUTION INTRAMUSCULAR; INTRAVENOUS at 16:09

## 2024-12-02 RX ADMIN — CYANOCOBALAMIN TAB 1000 MCG 1000 MCG: 1000 TAB at 08:16

## 2024-12-02 RX ADMIN — Medication 1 G: at 11:27

## 2024-12-02 RX ADMIN — SODIUM CHLORIDE, PRESERVATIVE FREE 10 ML: 5 INJECTION INTRAVENOUS at 20:01

## 2024-12-02 RX ADMIN — OXYCODONE 10 MG: 5 TABLET ORAL at 01:48

## 2024-12-02 RX ADMIN — OXYCODONE 5 MG: 5 TABLET ORAL at 20:03

## 2024-12-02 RX ADMIN — TAMSULOSIN HYDROCHLORIDE 0.4 MG: 0.4 CAPSULE ORAL at 08:16

## 2024-12-02 RX ADMIN — Medication 1 G: at 08:17

## 2024-12-02 RX ADMIN — MORPHINE SULFATE 4 MG: 4 INJECTION, SOLUTION INTRAMUSCULAR; INTRAVENOUS at 22:17

## 2024-12-02 RX ADMIN — AMLODIPINE BESYLATE 5 MG: 5 TABLET ORAL at 08:16

## 2024-12-02 RX ADMIN — MORPHINE SULFATE 4 MG: 4 INJECTION, SOLUTION INTRAMUSCULAR; INTRAVENOUS at 04:15

## 2024-12-02 RX ADMIN — OXYCODONE 10 MG: 5 TABLET ORAL at 11:33

## 2024-12-02 RX ADMIN — Medication 100 MG: at 08:17

## 2024-12-02 RX ADMIN — TIZANIDINE 2 MG: 4 TABLET ORAL at 20:01

## 2024-12-02 RX ADMIN — SODIUM CHLORIDE, PRESERVATIVE FREE 10 ML: 5 INJECTION INTRAVENOUS at 08:18

## 2024-12-02 ASSESSMENT — PAIN SCALES - GENERAL
PAINLEVEL_OUTOF10: 8
PAINLEVEL_OUTOF10: 8
PAINLEVEL_OUTOF10: 7
PAINLEVEL_OUTOF10: 2
PAINLEVEL_OUTOF10: 8
PAINLEVEL_OUTOF10: 8
PAINLEVEL_OUTOF10: 6
PAINLEVEL_OUTOF10: 8
PAINLEVEL_OUTOF10: 0
PAINLEVEL_OUTOF10: 3
PAINLEVEL_OUTOF10: 1
PAINLEVEL_OUTOF10: 8
PAINLEVEL_OUTOF10: 10
PAINLEVEL_OUTOF10: 8

## 2024-12-02 ASSESSMENT — PAIN DESCRIPTION - LOCATION
LOCATION: HIP;LEG
LOCATION: LEG;HIP
LOCATION: HIP;LEG
LOCATION: HIP
LOCATION: HIP;LEG
LOCATION: HIP;LEG
LOCATION: LEG;HIP
LOCATION: HIP

## 2024-12-02 ASSESSMENT — PAIN DESCRIPTION - PAIN TYPE
TYPE: ACUTE PAIN;SURGICAL PAIN
TYPE: ACUTE PAIN;SURGICAL PAIN

## 2024-12-02 ASSESSMENT — PAIN DESCRIPTION - DESCRIPTORS
DESCRIPTORS: ACHING;DISCOMFORT
DESCRIPTORS: ACHING;DISCOMFORT;SPASM
DESCRIPTORS: ACHING;DISCOMFORT
DESCRIPTORS: ACHING
DESCRIPTORS: SPASM
DESCRIPTORS: ACHING;DISCOMFORT;SORE

## 2024-12-02 ASSESSMENT — PAIN DESCRIPTION - ORIENTATION
ORIENTATION: LEFT

## 2024-12-02 ASSESSMENT — PAIN DESCRIPTION - ONSET
ONSET: ON-GOING
ONSET: ON-GOING

## 2024-12-02 ASSESSMENT — PAIN DESCRIPTION - FREQUENCY
FREQUENCY: CONTINUOUS
FREQUENCY: CONTINUOUS

## 2024-12-02 ASSESSMENT — PAIN SCALES - WONG BAKER: WONGBAKER_NUMERICALRESPONSE: NO HURT

## 2024-12-02 ASSESSMENT — PAIN - FUNCTIONAL ASSESSMENT
PAIN_FUNCTIONAL_ASSESSMENT: PREVENTS OR INTERFERES SOME ACTIVE ACTIVITIES AND ADLS
PAIN_FUNCTIONAL_ASSESSMENT: PREVENTS OR INTERFERES SOME ACTIVE ACTIVITIES AND ADLS

## 2024-12-02 NOTE — PLAN OF CARE
Problem: ABCDS Injury Assessment  Goal: Absence of physical injury  12/1/2024 2223 by Susan Robles, RN  Outcome: Progressing     Problem: Skin/Tissue Integrity  Goal: Absence of new skin breakdown  Description: 1.  Monitor for areas of redness and/or skin breakdown  2.  Assess vascular access sites hourly  3.  Every 4-6 hours minimum:  Change oxygen saturation probe site  4.  Every 4-6 hours:  If on nasal continuous positive airway pressure, respiratory therapy assess nares and determine need for appliance change or resting period.  12/1/2024 2223 by Susan Robles, RN  Outcome: Progressing     Problem: Discharge Planning  Goal: Discharge to home or other facility with appropriate resources  12/1/2024 2223 by Susan Robles RN  Outcome: Progressing     Problem: Safety - Adult  Goal: Free from fall injury  12/1/2024 2223 by Susan Robles, RN  Outcome: Progressing     Problem: Pain  Goal: Verbalizes/displays adequate comfort level or baseline comfort level  12/1/2024 2223 by Susan Robles RN  Outcome: Progressing     Problem: Respiratory - Adult  Goal: Achieves optimal ventilation and oxygenation  Outcome: Progressing     Problem: Skin/Tissue Integrity - Adult  Goal: Skin integrity remains intact  Outcome: Progressing     Problem: Musculoskeletal - Adult  Goal: Return mobility to safest level of function  Outcome: Progressing     Problem: Musculoskeletal - Adult  Goal: Maintain proper alignment of affected body part  Outcome: Progressing     Problem: Musculoskeletal - Adult  Goal: Return ADL status to a safe level of function  Outcome: Progressing     Problem: Genitourinary - Adult  Goal: Absence of urinary retention  Outcome: Progressing     Problem: Genitourinary - Adult  Goal: Urinary catheter remains patent  Outcome: Progressing

## 2024-12-02 NOTE — CARE COORDINATION
CASE MANAGEMENT..... MRI brain from 11/29/24 revealed possible small acute lacunar infarct-confirmed with Shahrzad VAUGHN that she is aware. s/p left hemiarthroplasty from 11/30/24. PT/OT on consult and requested to see. Will need for precert. Confirmed with Abeba Lopez that facility can accept if patient agreeable to no alcohol. She will follow up with him. Left sided CT placed 11/30/24 remains intact to continuous suction at -20. Minimal output of 10cc overnite noted. Tolerating room air. Continue to follow labs/vitals. Will follow.

## 2024-12-02 NOTE — PLAN OF CARE
Problem: ABCDS Injury Assessment  Goal: Absence of physical injury  12/2/2024 1016 by Jany Louis RN  Outcome: Progressing  Flowsheets (Taken 12/2/2024 0800)  Absence of Physical Injury: Implement safety measures based on patient assessment  12/1/2024 2223 by Susan Robles RN  Outcome: Progressing     Problem: Skin/Tissue Integrity  Goal: Absence of new skin breakdown  Description: 1.  Monitor for areas of redness and/or skin breakdown  2.  Assess vascular access sites hourly  3.  Every 4-6 hours minimum:  Change oxygen saturation probe site  4.  Every 4-6 hours:  If on nasal continuous positive airway pressure, respiratory therapy assess nares and determine need for appliance change or resting period.  12/2/2024 1016 by Jany Louis RN  Outcome: Progressing  12/1/2024 2223 by Susan Robles RN  Outcome: Progressing     Problem: Discharge Planning  Goal: Discharge to home or other facility with appropriate resources  12/2/2024 1016 by Jany Louis RN  Outcome: Progressing  12/1/2024 2223 by Susan Robles RN  Outcome: Progressing     Problem: Safety - Adult  Goal: Free from fall injury  12/2/2024 1016 by Jany Louis RN  Outcome: Progressing  Flowsheets (Taken 12/2/2024 0800)  Free From Fall Injury: Instruct family/caregiver on patient safety  12/1/2024 2223 by Susan Robles RN  Outcome: Progressing     Problem: Pain  Goal: Verbalizes/displays adequate comfort level or baseline comfort level  12/2/2024 1016 by Jany Louis RN  Outcome: Progressing  12/1/2024 2223 by Susan Robles RN  Outcome: Progressing     Problem: Respiratory - Adult  Goal: Achieves optimal ventilation and oxygenation  12/2/2024 1016 by Jany Louis RN  Outcome: Progressing  Flowsheets (Taken 12/2/2024 0800)  Achieves optimal ventilation and oxygenation:   Assess for changes in respiratory status   Assess for changes in mentation and behavior   Position to facilitate oxygenation and minimize respiratory effort

## 2024-12-03 ENCOUNTER — APPOINTMENT (OUTPATIENT)
Dept: GENERAL RADIOLOGY | Age: 74
DRG: 521 | End: 2024-12-03
Payer: MEDICARE

## 2024-12-03 LAB
ALBUMIN SERPL-MCNC: 2.8 G/DL (ref 3.5–5.2)
ALP SERPL-CCNC: 59 U/L (ref 40–129)
ALT SERPL-CCNC: 6 U/L (ref 0–40)
ANION GAP SERPL CALCULATED.3IONS-SCNC: 8 MMOL/L (ref 7–16)
AST SERPL-CCNC: 25 U/L (ref 0–39)
BILIRUB SERPL-MCNC: 0.4 MG/DL (ref 0–1.2)
BUN SERPL-MCNC: 16 MG/DL (ref 6–23)
CALCIUM SERPL-MCNC: 8.2 MG/DL (ref 8.6–10.2)
CHLORIDE SERPL-SCNC: 96 MMOL/L (ref 98–107)
CO2 SERPL-SCNC: 23 MMOL/L (ref 22–29)
CREAT SERPL-MCNC: 0.9 MG/DL (ref 0.7–1.2)
GFR, ESTIMATED: 90 ML/MIN/1.73M2
GLUCOSE SERPL-MCNC: 102 MG/DL (ref 74–99)
OSMOLALITY UR: 563 MOSM/KG (ref 300–900)
POTASSIUM SERPL-SCNC: 4.4 MMOL/L (ref 3.5–5)
PROT SERPL-MCNC: 5.3 G/DL (ref 6.4–8.3)
SODIUM SERPL-SCNC: 127 MMOL/L (ref 132–146)
SODIUM UR-SCNC: 55 MMOL/L

## 2024-12-03 PROCEDURE — 6370000000 HC RX 637 (ALT 250 FOR IP): Performed by: HOSPITALIST

## 2024-12-03 PROCEDURE — 6360000002 HC RX W HCPCS: Performed by: GENERAL ACUTE CARE HOSPITAL

## 2024-12-03 PROCEDURE — 99231 SBSQ HOSP IP/OBS SF/LOW 25: CPT | Performed by: STUDENT IN AN ORGANIZED HEALTH CARE EDUCATION/TRAINING PROGRAM

## 2024-12-03 PROCEDURE — 97530 THERAPEUTIC ACTIVITIES: CPT

## 2024-12-03 PROCEDURE — 71045 X-RAY EXAM CHEST 1 VIEW: CPT

## 2024-12-03 PROCEDURE — 84300 ASSAY OF URINE SODIUM: CPT

## 2024-12-03 PROCEDURE — 6370000000 HC RX 637 (ALT 250 FOR IP): Performed by: GENERAL ACUTE CARE HOSPITAL

## 2024-12-03 PROCEDURE — 6370000000 HC RX 637 (ALT 250 FOR IP): Performed by: INTERNAL MEDICINE

## 2024-12-03 PROCEDURE — 6370000000 HC RX 637 (ALT 250 FOR IP): Performed by: STUDENT IN AN ORGANIZED HEALTH CARE EDUCATION/TRAINING PROGRAM

## 2024-12-03 PROCEDURE — 2580000003 HC RX 258: Performed by: GENERAL ACUTE CARE HOSPITAL

## 2024-12-03 PROCEDURE — 2060000000 HC ICU INTERMEDIATE R&B

## 2024-12-03 PROCEDURE — 97535 SELF CARE MNGMENT TRAINING: CPT

## 2024-12-03 PROCEDURE — 83935 ASSAY OF URINE OSMOLALITY: CPT

## 2024-12-03 PROCEDURE — 6370000000 HC RX 637 (ALT 250 FOR IP): Performed by: NURSE PRACTITIONER

## 2024-12-03 PROCEDURE — 36415 COLL VENOUS BLD VENIPUNCTURE: CPT

## 2024-12-03 PROCEDURE — 80053 COMPREHEN METABOLIC PANEL: CPT

## 2024-12-03 PROCEDURE — 97110 THERAPEUTIC EXERCISES: CPT

## 2024-12-03 RX ORDER — ATORVASTATIN CALCIUM 40 MG/1
40 TABLET, FILM COATED ORAL NIGHTLY
Status: DISCONTINUED | OUTPATIENT
Start: 2024-12-03 | End: 2024-12-05 | Stop reason: HOSPADM

## 2024-12-03 RX ORDER — DOCUSATE SODIUM 100 MG/1
100 CAPSULE, LIQUID FILLED ORAL 2 TIMES DAILY
Status: DISCONTINUED | OUTPATIENT
Start: 2024-12-03 | End: 2024-12-05 | Stop reason: HOSPADM

## 2024-12-03 RX ORDER — ENOXAPARIN SODIUM 100 MG/ML
40 INJECTION SUBCUTANEOUS DAILY
Qty: 16.8 ML | Refills: 0 | Status: SHIPPED | OUTPATIENT
Start: 2024-12-01 | End: 2024-12-05 | Stop reason: HOSPADM

## 2024-12-03 RX ORDER — ASPIRIN 81 MG/1
81 TABLET, CHEWABLE ORAL DAILY
Status: DISCONTINUED | OUTPATIENT
Start: 2024-12-03 | End: 2024-12-05 | Stop reason: HOSPADM

## 2024-12-03 RX ORDER — OXYCODONE HYDROCHLORIDE 5 MG/1
5 TABLET ORAL EVERY 4 HOURS PRN
Qty: 30 TABLET | Refills: 0 | Status: SHIPPED | OUTPATIENT
Start: 2024-12-03 | End: 2024-12-10

## 2024-12-03 RX ADMIN — Medication 3 MG: at 20:08

## 2024-12-03 RX ADMIN — ENOXAPARIN SODIUM 40 MG: 100 INJECTION SUBCUTANEOUS at 08:26

## 2024-12-03 RX ADMIN — CYANOCOBALAMIN TAB 1000 MCG 1000 MCG: 1000 TAB at 08:27

## 2024-12-03 RX ADMIN — POLYETHYLENE GLYCOL 3350 17 G: 17 POWDER, FOR SOLUTION ORAL at 08:27

## 2024-12-03 RX ADMIN — FOLIC ACID 1 MG: 1 TABLET ORAL at 08:27

## 2024-12-03 RX ADMIN — Medication 1 G: at 08:27

## 2024-12-03 RX ADMIN — MORPHINE SULFATE 4 MG: 4 INJECTION, SOLUTION INTRAMUSCULAR; INTRAVENOUS at 08:32

## 2024-12-03 RX ADMIN — Medication 1 G: at 11:03

## 2024-12-03 RX ADMIN — ASPIRIN 81 MG CHEWABLE TABLET 81 MG: 81 TABLET CHEWABLE at 08:26

## 2024-12-03 RX ADMIN — OXYCODONE 10 MG: 5 TABLET ORAL at 20:09

## 2024-12-03 RX ADMIN — DOCUSATE SODIUM 100 MG: 100 CAPSULE, LIQUID FILLED ORAL at 08:27

## 2024-12-03 RX ADMIN — SODIUM CHLORIDE, PRESERVATIVE FREE 10 ML: 5 INJECTION INTRAVENOUS at 08:34

## 2024-12-03 RX ADMIN — Medication 100 MG: at 08:26

## 2024-12-03 RX ADMIN — Medication 1 G: at 15:57

## 2024-12-03 RX ADMIN — MORPHINE SULFATE 4 MG: 4 INJECTION, SOLUTION INTRAMUSCULAR; INTRAVENOUS at 04:23

## 2024-12-03 RX ADMIN — TIZANIDINE 2 MG: 4 TABLET ORAL at 14:00

## 2024-12-03 RX ADMIN — FERROUS SULFATE TAB 325 MG (65 MG ELEMENTAL FE) 325 MG: 325 (65 FE) TAB at 08:27

## 2024-12-03 RX ADMIN — TIZANIDINE 2 MG: 4 TABLET ORAL at 08:26

## 2024-12-03 RX ADMIN — AMLODIPINE BESYLATE 5 MG: 5 TABLET ORAL at 08:27

## 2024-12-03 RX ADMIN — TAMSULOSIN HYDROCHLORIDE 0.4 MG: 0.4 CAPSULE ORAL at 08:27

## 2024-12-03 RX ADMIN — TIZANIDINE 2 MG: 4 TABLET ORAL at 20:08

## 2024-12-03 RX ADMIN — DOCUSATE SODIUM 100 MG: 100 CAPSULE, LIQUID FILLED ORAL at 20:08

## 2024-12-03 RX ADMIN — SODIUM CHLORIDE, PRESERVATIVE FREE 10 ML: 5 INJECTION INTRAVENOUS at 20:10

## 2024-12-03 ASSESSMENT — PAIN DESCRIPTION - DESCRIPTORS
DESCRIPTORS: DISCOMFORT;ACHING
DESCRIPTORS: OTHER (COMMENT)
DESCRIPTORS: ACHING;DISCOMFORT
DESCRIPTORS: ACHING;DISCOMFORT;SORE
DESCRIPTORS: SORE;ACHING;DISCOMFORT

## 2024-12-03 ASSESSMENT — PAIN DESCRIPTION - ORIENTATION
ORIENTATION: LEFT

## 2024-12-03 ASSESSMENT — PAIN SCALES - GENERAL
PAINLEVEL_OUTOF10: 6
PAINLEVEL_OUTOF10: 8
PAINLEVEL_OUTOF10: 2
PAINLEVEL_OUTOF10: 9
PAINLEVEL_OUTOF10: 7
PAINLEVEL_OUTOF10: 8

## 2024-12-03 ASSESSMENT — PAIN - FUNCTIONAL ASSESSMENT
PAIN_FUNCTIONAL_ASSESSMENT: PREVENTS OR INTERFERES SOME ACTIVE ACTIVITIES AND ADLS
PAIN_FUNCTIONAL_ASSESSMENT: PREVENTS OR INTERFERES WITH MANY ACTIVE NOT PASSIVE ACTIVITIES

## 2024-12-03 ASSESSMENT — PAIN SCALES - WONG BAKER: WONGBAKER_NUMERICALRESPONSE: NO HURT

## 2024-12-03 ASSESSMENT — PAIN DESCRIPTION - ONSET
ONSET: ON-GOING
ONSET: ON-GOING

## 2024-12-03 ASSESSMENT — PAIN DESCRIPTION - LOCATION
LOCATION: HIP
LOCATION: HIP
LOCATION: HIP;LEG
LOCATION: LEG;HIP
LOCATION: HIP;LEG

## 2024-12-03 ASSESSMENT — PAIN DESCRIPTION - PAIN TYPE
TYPE: SURGICAL PAIN
TYPE: ACUTE PAIN;SURGICAL PAIN

## 2024-12-03 ASSESSMENT — PAIN DESCRIPTION - FREQUENCY
FREQUENCY: CONTINUOUS
FREQUENCY: CONTINUOUS

## 2024-12-03 NOTE — PLAN OF CARE
Problem: ABCDS Injury Assessment  Goal: Absence of physical injury  12/3/2024 0931 by Jany Louis RN  Outcome: Progressing  Flowsheets (Taken 12/3/2024 0800)  Absence of Physical Injury: Implement safety measures based on patient assessment  12/2/2024 2224 by Susan Robles RN  Outcome: Progressing     Problem: Skin/Tissue Integrity  Goal: Absence of new skin breakdown  Description: 1.  Monitor for areas of redness and/or skin breakdown  2.  Assess vascular access sites hourly  3.  Every 4-6 hours minimum:  Change oxygen saturation probe site  4.  Every 4-6 hours:  If on nasal continuous positive airway pressure, respiratory therapy assess nares and determine need for appliance change or resting period.  12/3/2024 0931 by Jany Louis RN  Outcome: Progressing  12/2/2024 2224 by Susan Robles RN  Outcome: Progressing     Problem: Discharge Planning  Goal: Discharge to home or other facility with appropriate resources  12/3/2024 0931 by Jany Louis RN  Outcome: Progressing  Flowsheets (Taken 12/3/2024 0800)  Discharge to home or other facility with appropriate resources: Identify barriers to discharge with patient and caregiver  12/2/2024 2224 by Susan Robles RN  Outcome: Progressing     Problem: Safety - Adult  Goal: Free from fall injury  12/3/2024 0931 by Jany Louis RN  Outcome: Progressing  Flowsheets (Taken 12/3/2024 0800)  Free From Fall Injury: Instruct family/caregiver on patient safety  12/2/2024 2224 by Susan Robles RN  Outcome: Progressing     Problem: Pain  Goal: Verbalizes/displays adequate comfort level or baseline comfort level  12/3/2024 0931 by Jany Louis RN  Outcome: Progressing  12/2/2024 2224 by Susan Robles RN  Outcome: Progressing     Problem: Respiratory - Adult  Goal: Achieves optimal ventilation and oxygenation  Outcome: Progressing  Flowsheets (Taken 12/3/2024 0800)  Achieves optimal ventilation and oxygenation:   Assess for changes in respiratory status

## 2024-12-03 NOTE — DISCHARGE INSTR - COC
11/01/2013    [facility] - Laparoscopic with Mesh       Immunization History:   Immunization History   Administered Date(s) Administered    COVID-19, PFIZER PURPLE top, DILUTE for use, (age 12 y+), 30mcg/0.3mL 02/27/2021, 03/23/2021       Active Problems:  Patient Active Problem List   Diagnosis Code    Depression with suicidal ideation F32.A, R45.851    Mixed bipolar II disorder ([facility]) F31.81    Hyponatremia E87.1    Other fracture of left femur, initial encounter for closed fracture ([facility]) S72.8X2A    Alcohol dependence ([facility]) F10.20    Lung mass R91.8    Hypertension I10    Hyperlipidemia E78.5       Isolation/Infection:   Isolation            No Isolation          Patient Infection Status       None to display            Nurse Assessment:  Last Vital Signs: BP (!) 160/86   Pulse (!) 112   Temp 98 °F (36.7 °C) (Axillary)   Resp 18   Ht 1.727 m (5' 8\")   Wt 59 kg (130 lb)   SpO2 99%   BMI 19.77 kg/m²     Last documented pain score (0-10 scale): Pain Level: 2  Last Weight:   Wt Readings from Last 1 Encounters:   12/02/24 59 kg (130 lb)     Mental Status:  oriented, alert, coherent, logical, thought processes intact, able to concentrate and follow conversation, and forgetful at times     IV Access:  - None    Nursing Mobility/ADLs:  Walking   Assisted  Transfer  Assisted  Bathing  Assisted  Dressing  Assisted  Toileting  Assisted  Feeding  Independent  Med Admin  Independent  Med Delivery   whole    Wound Care Documentation and Therapy:  Incision 11/30/24 Hip Left (Active)   Dressing Status Clean;Dry;Intact 12/03/24 0800   Closure Sutures 12/03/24 0800   Drainage Amount None (dry) 12/03/24 0800   Odor None 12/03/24 0800   Number of days: 2        Elimination:  Continence:   Bowel: Yes  Bladder: Yes  Urinary Catheter: None   Colostomy/Ileostomy/Ileal Conduit: No       Date of Last BM: 12/5/2024    Intake/Output Summary (Last 24 hours) at 12/3/2024 1048  Last data filed at 12/2/2024 2123  Gross per 24 hour   Intake

## 2024-12-03 NOTE — PLAN OF CARE
Problem: ABCDS Injury Assessment  Goal: Absence of physical injury  12/2/2024 2224 by Susan Robles RN  Outcome: Progressing     Problem: Skin/Tissue Integrity  Goal: Absence of new skin breakdown  Description: 1.  Monitor for areas of redness and/or skin breakdown  2.  Assess vascular access sites hourly  3.  Every 4-6 hours minimum:  Change oxygen saturation probe site  4.  Every 4-6 hours:  If on nasal continuous positive airway pressure, respiratory therapy assess nares and determine need for appliance change or resting period.  12/2/2024 2224 by Susan Robels RN  Outcome: Progressing     Problem: Discharge Planning  Goal: Discharge to home or other facility with appropriate resources  12/2/2024 2224 by Susan Robles RN  Outcome: Progressing     Problem: Safety - Adult  Goal: Free from fall injury  12/2/2024 2224 by Susan Robles RN  Outcome: Progressing     Problem: Pain  Goal: Verbalizes/displays adequate comfort level or baseline comfort level  12/2/2024 2224 by Susan Robles RN  Outcome: Progressing       Problem: Skin/Tissue Integrity - Adult  Goal: Skin integrity remains intact  12/2/2024 2224 by Susan Robles RN  Outcome: Progressing     Problem: Musculoskeletal - Adult  Goal: Return mobility to safest level of function  12/2/2024 2224 by Susan Robles RN  Outcome: Progressing     Problem: Musculoskeletal - Adult  Goal: Maintain proper alignment of affected body part  12/2/2024 2224 by Susan Robles RN  Outcome: Progressing     Problem: Musculoskeletal - Adult  Goal: Return ADL status to a safe level of function  12/2/2024 2224 by Susan Robles RN  Outcome: Progressing     Problem: Genitourinary - Adult  Goal: Absence of urinary retention  12/2/2024 2224 by Susan Robles RN  Outcome: Progressing     Problem: Genitourinary - Adult  Goal: Urinary catheter remains patent  12/2/2024 2224 by Susan Robles RN  Outcome: Progressing

## 2024-12-03 NOTE — CARE COORDINATION
CASE MANAGEMENT.... PT 11/OT 14. Left sided CT clamped. Tolerating room air. Requested for Abeba with [facility] to initiate precert. N 17 in epic. Need ambulette/[id number] forms completed. Will follow.

## 2024-12-04 ENCOUNTER — APPOINTMENT (OUTPATIENT)
Age: 74
DRG: 521 | End: 2024-12-04
Payer: MEDICARE

## 2024-12-04 ENCOUNTER — APPOINTMENT (OUTPATIENT)
Dept: GENERAL RADIOLOGY | Age: 74
DRG: 521 | End: 2024-12-04
Payer: MEDICARE

## 2024-12-04 PROBLEM — D64.9 ANEMIA: Status: ACTIVE | Noted: 2024-12-04

## 2024-12-04 LAB
ALBUMIN SERPL-MCNC: 3 G/DL (ref 3.5–5.2)
ALP SERPL-CCNC: 67 U/L (ref 40–129)
ALT SERPL-CCNC: 9 U/L (ref 0–40)
ANION GAP SERPL CALCULATED.3IONS-SCNC: 10 MMOL/L (ref 7–16)
ANION GAP SERPL CALCULATED.3IONS-SCNC: 10 MMOL/L (ref 7–16)
AST SERPL-CCNC: 28 U/L (ref 0–39)
ATYPICAL LYMPHOCYTE ABSOLUTE COUNT: 0.06 K/UL (ref 0–0.46)
ATYPICAL LYMPHOCYTES: 1 % (ref 0–4)
BASOPHILS # BLD: 0.11 K/UL (ref 0–0.2)
BASOPHILS NFR BLD: 2 % (ref 0–2)
BILIRUB SERPL-MCNC: 0.4 MG/DL (ref 0–1.2)
BUN SERPL-MCNC: 13 MG/DL (ref 6–23)
BUN SERPL-MCNC: 13 MG/DL (ref 6–23)
CALCIUM SERPL-MCNC: 8.4 MG/DL (ref 8.6–10.2)
CALCIUM SERPL-MCNC: 8.5 MG/DL (ref 8.6–10.2)
CHLORIDE SERPL-SCNC: 97 MMOL/L (ref 98–107)
CHLORIDE SERPL-SCNC: 98 MMOL/L (ref 98–107)
CO2 SERPL-SCNC: 25 MMOL/L (ref 22–29)
CO2 SERPL-SCNC: 25 MMOL/L (ref 22–29)
CREAT SERPL-MCNC: 0.8 MG/DL (ref 0.7–1.2)
CREAT SERPL-MCNC: 0.8 MG/DL (ref 0.7–1.2)
ECHO AO ASC DIAM: 3.3 CM
ECHO AO ASCENDING AORTA INDEX: 1.94 CM/M2
ECHO AV AREA PEAK VELOCITY: 2.6 CM2
ECHO AV AREA VTI: 2.6 CM2
ECHO AV AREA/BSA PEAK VELOCITY: 1.5 CM2/M2
ECHO AV AREA/BSA VTI: 1.5 CM2/M2
ECHO AV CUSP MM: 1.3 CM
ECHO AV MEAN GRADIENT: 4 MMHG
ECHO AV MEAN VELOCITY: 0.9 M/S
ECHO AV PEAK GRADIENT: 7 MMHG
ECHO AV PEAK VELOCITY: 1.3 M/S
ECHO AV VELOCITY RATIO: 0.62
ECHO AV VTI: 27.2 CM
ECHO BSA: 1.71 M2
ECHO EST RA PRESSURE: 3 MMHG
ECHO LA DIAMETER INDEX: 2.18 CM/M2
ECHO LA DIAMETER: 3.7 CM
ECHO LA VOL A-L A2C: 63 ML (ref 18–58)
ECHO LA VOL A-L A4C: 65 ML (ref 18–58)
ECHO LA VOL MOD A2C: 59 ML (ref 18–58)
ECHO LA VOL MOD A4C: 58 ML (ref 18–58)
ECHO LA VOLUME AREA LENGTH: 68 ML
ECHO LA VOLUME INDEX A-L A2C: 37 ML/M2 (ref 16–34)
ECHO LA VOLUME INDEX A-L A4C: 38 ML/M2 (ref 16–34)
ECHO LA VOLUME INDEX AREA LENGTH: 40 ML/M2 (ref 16–34)
ECHO LA VOLUME INDEX MOD A2C: 35 ML/M2 (ref 16–34)
ECHO LA VOLUME INDEX MOD A4C: 34 ML/M2 (ref 16–34)
ECHO LV E' LATERAL VELOCITY: 10 CM/S
ECHO LV E' SEPTAL VELOCITY: 8 CM/S
ECHO LV EF PHYSICIAN: 55 %
ECHO LV FRACTIONAL SHORTENING: 33 % (ref 28–44)
ECHO LV INTERNAL DIMENSION DIASTOLE INDEX: 2.53 CM/M2
ECHO LV INTERNAL DIMENSION DIASTOLIC: 4.3 CM (ref 4.2–5.9)
ECHO LV INTERNAL DIMENSION SYSTOLIC INDEX: 1.71 CM/M2
ECHO LV INTERNAL DIMENSION SYSTOLIC: 2.9 CM
ECHO LV ISOVOLUMETRIC RELAXATION TIME (IVRT): 96.9 MS
ECHO LV IVSD: 1 CM (ref 0.6–1)
ECHO LV IVSS: 1.2 CM
ECHO LV MASS 2D: 142.5 G (ref 88–224)
ECHO LV MASS INDEX 2D: 83.8 G/M2 (ref 49–115)
ECHO LV POSTERIOR WALL DIASTOLIC: 1 CM (ref 0.6–1)
ECHO LV POSTERIOR WALL SYSTOLIC: 1.2 CM
ECHO LV RELATIVE WALL THICKNESS RATIO: 0.47
ECHO LVOT AREA: 3.8 CM2
ECHO LVOT AV VTI INDEX: 0.65
ECHO LVOT DIAM: 2.2 CM
ECHO LVOT MEAN GRADIENT: 2 MMHG
ECHO LVOT PEAK GRADIENT: 3 MMHG
ECHO LVOT PEAK VELOCITY: 0.8 M/S
ECHO LVOT STROKE VOLUME INDEX: 39.3 ML/M2
ECHO LVOT SV: 66.9 ML
ECHO LVOT VTI: 17.6 CM
ECHO MV "A" WAVE DURATION: 115.3 MSEC
ECHO MV A VELOCITY: 0.81 M/S
ECHO MV AREA PHT: 3.6 CM2
ECHO MV AREA VTI: 3.1 CM2
ECHO MV E DECELERATION TIME (DT): 192.4 MS
ECHO MV E VELOCITY: 0.77 M/S
ECHO MV E/A RATIO: 0.95
ECHO MV E/E' LATERAL: 7.7
ECHO MV E/E' RATIO (AVERAGED): 8.66
ECHO MV E/E' SEPTAL: 9.63
ECHO MV LVOT VTI INDEX: 1.23
ECHO MV MAX VELOCITY: 0.9 M/S
ECHO MV MEAN GRADIENT: 2 MMHG
ECHO MV MEAN VELOCITY: 0.7 M/S
ECHO MV PEAK GRADIENT: 3 MMHG
ECHO MV PRESSURE HALF TIME (PHT): 61.6 MS
ECHO MV VTI: 21.7 CM
ECHO PULMONARY ARTERY END DIASTOLIC PRESSURE: 7 MMHG
ECHO PV MAX VELOCITY: 1.2 M/S
ECHO PV MEAN GRADIENT: 3 MMHG
ECHO PV MEAN VELOCITY: 0.8 M/S
ECHO PV PEAK GRADIENT: 6 MMHG
ECHO PV REGURGITANT MAX VELOCITY: 1.3 M/S
ECHO PV VTI: 21.5 CM
ECHO RIGHT VENTRICULAR SYSTOLIC PRESSURE (RVSP): 63 MMHG
ECHO RV INTERNAL DIMENSION: 3.6 CM
ECHO RV TAPSE: 1.8 CM (ref 1.7–?)
ECHO TV REGURGITANT MAX VELOCITY: 3.88 M/S
ECHO TV REGURGITANT PEAK GRADIENT: 60 MMHG
EOSINOPHIL # BLD: 0.06 K/UL (ref 0.05–0.5)
EOSINOPHILS RELATIVE PERCENT: 1 % (ref 0–6)
ERYTHROCYTE [DISTWIDTH] IN BLOOD BY AUTOMATED COUNT: 12.6 % (ref 11.5–15)
GFR, ESTIMATED: >90 ML/MIN/1.73M2
GFR, ESTIMATED: >90 ML/MIN/1.73M2
GLUCOSE SERPL-MCNC: 128 MG/DL (ref 74–99)
GLUCOSE SERPL-MCNC: 128 MG/DL (ref 74–99)
HCT VFR BLD AUTO: 24.9 % (ref 37–54)
HGB BLD-MCNC: 8.4 G/DL (ref 12.5–16.5)
LYMPHOCYTES NFR BLD: 0.39 K/UL (ref 1.5–4)
LYMPHOCYTES RELATIVE PERCENT: 6 % (ref 20–42)
MAGNESIUM SERPL-MCNC: 2 MG/DL (ref 1.6–2.6)
MCH RBC QN AUTO: 37.2 PG (ref 26–35)
MCHC RBC AUTO-ENTMCNC: 33.7 G/DL (ref 32–34.5)
MCV RBC AUTO: 110.2 FL (ref 80–99.9)
METAMYELOCYTES ABSOLUTE COUNT: 0.06 K/UL (ref 0–0.12)
METAMYELOCYTES: 1 % (ref 0–1)
MONOCYTES NFR BLD: 0.72 K/UL (ref 0.1–0.95)
MONOCYTES NFR BLD: 11 % (ref 2–12)
NEUTROPHILS NFR BLD: 78 % (ref 43–80)
NEUTS SEG NFR BLD: 5.01 K/UL (ref 1.8–7.3)
PLATELET # BLD AUTO: 307 K/UL (ref 130–450)
PMV BLD AUTO: 9.6 FL (ref 7–12)
POTASSIUM SERPL-SCNC: 4.4 MMOL/L (ref 3.5–5)
POTASSIUM SERPL-SCNC: 4.4 MMOL/L (ref 3.5–5)
PROT SERPL-MCNC: 5.7 G/DL (ref 6.4–8.3)
RBC # BLD AUTO: 2.26 M/UL (ref 3.8–5.8)
RBC # BLD: ABNORMAL 10*6/UL
SODIUM SERPL-SCNC: 132 MMOL/L (ref 132–146)
SODIUM SERPL-SCNC: 133 MMOL/L (ref 132–146)
WBC # BLD: ABNORMAL 10*3/UL
WBC OTHER # BLD: 6.4 K/UL (ref 4.5–11.5)

## 2024-12-04 PROCEDURE — 6370000000 HC RX 637 (ALT 250 FOR IP): Performed by: GENERAL ACUTE CARE HOSPITAL

## 2024-12-04 PROCEDURE — 6360000002 HC RX W HCPCS: Performed by: GENERAL ACUTE CARE HOSPITAL

## 2024-12-04 PROCEDURE — 6370000000 HC RX 637 (ALT 250 FOR IP): Performed by: NURSE PRACTITIONER

## 2024-12-04 PROCEDURE — 6370000000 HC RX 637 (ALT 250 FOR IP): Performed by: INTERNAL MEDICINE

## 2024-12-04 PROCEDURE — 36415 COLL VENOUS BLD VENIPUNCTURE: CPT

## 2024-12-04 PROCEDURE — 2060000000 HC ICU INTERMEDIATE R&B

## 2024-12-04 PROCEDURE — 83735 ASSAY OF MAGNESIUM: CPT

## 2024-12-04 PROCEDURE — 6370000000 HC RX 637 (ALT 250 FOR IP): Performed by: STUDENT IN AN ORGANIZED HEALTH CARE EDUCATION/TRAINING PROGRAM

## 2024-12-04 PROCEDURE — 85025 COMPLETE CBC W/AUTO DIFF WBC: CPT

## 2024-12-04 PROCEDURE — 6370000000 HC RX 637 (ALT 250 FOR IP): Performed by: HOSPITALIST

## 2024-12-04 PROCEDURE — 71045 X-RAY EXAM CHEST 1 VIEW: CPT

## 2024-12-04 PROCEDURE — 99232 SBSQ HOSP IP/OBS MODERATE 35: CPT | Performed by: INTERNAL MEDICINE

## 2024-12-04 PROCEDURE — 2580000003 HC RX 258: Performed by: GENERAL ACUTE CARE HOSPITAL

## 2024-12-04 PROCEDURE — 80053 COMPREHEN METABOLIC PANEL: CPT

## 2024-12-04 PROCEDURE — 93306 TTE W/DOPPLER COMPLETE: CPT

## 2024-12-04 PROCEDURE — 97110 THERAPEUTIC EXERCISES: CPT

## 2024-12-04 PROCEDURE — 80048 BASIC METABOLIC PNL TOTAL CA: CPT

## 2024-12-04 PROCEDURE — 97530 THERAPEUTIC ACTIVITIES: CPT

## 2024-12-04 RX ORDER — METOPROLOL SUCCINATE 25 MG/1
25 TABLET, EXTENDED RELEASE ORAL 2 TIMES DAILY
Status: DISCONTINUED | OUTPATIENT
Start: 2024-12-04 | End: 2024-12-05 | Stop reason: HOSPADM

## 2024-12-04 RX ORDER — ALBUTEROL SULFATE 0.83 MG/ML
2.5 SOLUTION RESPIRATORY (INHALATION) EVERY 6 HOURS PRN
DISCHARGE
Start: 2024-12-04

## 2024-12-04 RX ORDER — SODIUM CHLORIDE 1 G/1
2 TABLET ORAL
Status: DISCONTINUED | OUTPATIENT
Start: 2024-12-04 | End: 2024-12-05 | Stop reason: HOSPADM

## 2024-12-04 RX ADMIN — ENOXAPARIN SODIUM 40 MG: 100 INJECTION SUBCUTANEOUS at 08:25

## 2024-12-04 RX ADMIN — METOPROLOL SUCCINATE 25 MG: 25 TABLET, EXTENDED RELEASE ORAL at 20:14

## 2024-12-04 RX ADMIN — DOCUSATE SODIUM 100 MG: 100 CAPSULE, LIQUID FILLED ORAL at 20:14

## 2024-12-04 RX ADMIN — TIZANIDINE 2 MG: 4 TABLET ORAL at 20:15

## 2024-12-04 RX ADMIN — CYANOCOBALAMIN TAB 1000 MCG 1000 MCG: 1000 TAB at 08:24

## 2024-12-04 RX ADMIN — TIZANIDINE 2 MG: 4 TABLET ORAL at 08:23

## 2024-12-04 RX ADMIN — DOCUSATE SODIUM 100 MG: 100 CAPSULE, LIQUID FILLED ORAL at 08:23

## 2024-12-04 RX ADMIN — Medication 2 G: at 11:13

## 2024-12-04 RX ADMIN — TAMSULOSIN HYDROCHLORIDE 0.4 MG: 0.4 CAPSULE ORAL at 08:23

## 2024-12-04 RX ADMIN — MORPHINE SULFATE 4 MG: 4 INJECTION, SOLUTION INTRAMUSCULAR; INTRAVENOUS at 04:52

## 2024-12-04 RX ADMIN — AMLODIPINE BESYLATE 5 MG: 5 TABLET ORAL at 08:23

## 2024-12-04 RX ADMIN — FERROUS SULFATE TAB 325 MG (65 MG ELEMENTAL FE) 325 MG: 325 (65 FE) TAB at 08:23

## 2024-12-04 RX ADMIN — Medication 2 G: at 08:24

## 2024-12-04 RX ADMIN — ASPIRIN 81 MG CHEWABLE TABLET 81 MG: 81 TABLET CHEWABLE at 08:23

## 2024-12-04 RX ADMIN — Medication 100 MG: at 08:24

## 2024-12-04 RX ADMIN — SODIUM CHLORIDE, PRESERVATIVE FREE 10 ML: 5 INJECTION INTRAVENOUS at 08:24

## 2024-12-04 RX ADMIN — SODIUM CHLORIDE, PRESERVATIVE FREE 10 ML: 5 INJECTION INTRAVENOUS at 20:16

## 2024-12-04 RX ADMIN — TIZANIDINE 2 MG: 4 TABLET ORAL at 12:58

## 2024-12-04 RX ADMIN — FOLIC ACID 1 MG: 1 TABLET ORAL at 08:23

## 2024-12-04 RX ADMIN — Medication 2 G: at 16:12

## 2024-12-04 RX ADMIN — OXYCODONE 10 MG: 5 TABLET ORAL at 20:15

## 2024-12-04 RX ADMIN — OXYCODONE 10 MG: 5 TABLET ORAL at 11:12

## 2024-12-04 RX ADMIN — METOPROLOL SUCCINATE 25 MG: 25 TABLET, EXTENDED RELEASE ORAL at 12:58

## 2024-12-04 ASSESSMENT — PAIN SCALES - GENERAL
PAINLEVEL_OUTOF10: 8
PAINLEVEL_OUTOF10: 0
PAINLEVEL_OUTOF10: 0
PAINLEVEL_OUTOF10: 2
PAINLEVEL_OUTOF10: 7
PAINLEVEL_OUTOF10: 0
PAINLEVEL_OUTOF10: 7
PAINLEVEL_OUTOF10: 2
PAINLEVEL_OUTOF10: 0
PAINLEVEL_OUTOF10: 8
PAINLEVEL_OUTOF10: 4

## 2024-12-04 ASSESSMENT — PAIN DESCRIPTION - LOCATION
LOCATION: HIP
LOCATION: HIP;LEG

## 2024-12-04 ASSESSMENT — PAIN DESCRIPTION - ORIENTATION
ORIENTATION: LEFT

## 2024-12-04 ASSESSMENT — PAIN DESCRIPTION - PAIN TYPE: TYPE: SURGICAL PAIN

## 2024-12-04 ASSESSMENT — PAIN DESCRIPTION - DESCRIPTORS
DESCRIPTORS: ACHING
DESCRIPTORS: SORE;DISCOMFORT;ACHING
DESCRIPTORS: ACHING
DESCRIPTORS: ACHING;BURNING

## 2024-12-04 ASSESSMENT — PAIN - FUNCTIONAL ASSESSMENT
PAIN_FUNCTIONAL_ASSESSMENT: PREVENTS OR INTERFERES SOME ACTIVE ACTIVITIES AND ADLS

## 2024-12-04 NOTE — PLAN OF CARE
Problem: ABCDS Injury Assessment  Goal: Absence of physical injury  12/4/2024 0844 by Sarahi Echeverria RN  Outcome: Progressing  12/3/2024 2104 by Susan Robles RN  Outcome: Progressing     Problem: Skin/Tissue Integrity  Goal: Absence of new skin breakdown  Description: 1.  Monitor for areas of redness and/or skin breakdown  2.  Assess vascular access sites hourly  3.  Every 4-6 hours minimum:  Change oxygen saturation probe site  4.  Every 4-6 hours:  If on nasal continuous positive airway pressure, respiratory therapy assess nares and determine need for appliance change or resting period.  12/4/2024 0844 by Sarahi Echeverria RN  Outcome: Progressing  12/3/2024 2104 by Susan Robles RN  Outcome: Progressing     Problem: Discharge Planning  Goal: Discharge to home or other facility with appropriate resources  12/4/2024 0844 by Sarahi Echeverria RN  Outcome: Progressing  12/3/2024 2104 by Susan Robles RN  Outcome: Progressing     Problem: Safety - Adult  Goal: Free from fall injury  12/4/2024 0844 by Sarahi Echeverria RN  Outcome: Progressing  12/3/2024 2104 by Susan Robles RN  Outcome: Progressing     Problem: Pain  Goal: Verbalizes/displays adequate comfort level or baseline comfort level  12/4/2024 0844 by Sarahi Echeverria RN  Outcome: Progressing  12/3/2024 2104 by Susan Robles RN  Outcome: Progressing     Problem: Respiratory - Adult  Goal: Achieves optimal ventilation and oxygenation  12/4/2024 0844 by Sarahi Echeverria RN  Outcome: Progressing  12/3/2024 2104 by Susan Robles RN  Outcome: Progressing     Problem: Skin/Tissue Integrity - Adult  Goal: Skin integrity remains intact  12/3/2024 2104 by Susan Robles RN  Outcome: Progressing     Problem: Musculoskeletal - Adult  Goal: Return mobility to safest level of function  12/3/2024 2104 by Susan Robles RN  Outcome: Progressing  Goal: Maintain proper alignment of affected body part  12/3/2024 2104 by Susan Robles RN  Outcome: Progressing  Goal:

## 2024-12-04 NOTE — CONSULTS
file   Social History Narrative    Not on file     Social Determinants of Health     Financial Resource Strain: Not on file   Food Insecurity: No Food Insecurity (11/26/2024)    Hunger Vital Sign     Worried About Running Out of Food in the Last Year: Never true     Ran Out of Food in the Last Year: Never true   Transportation Needs: No Transportation Needs (11/26/2024)    PRAPARE - Transportation     Lack of Transportation (Medical): No     Lack of Transportation (Non-Medical): No   Physical Activity: Not on file   Stress: Not on file   Social Connections: Not on file   Intimate Partner Violence: Not on file   Housing Stability: Low Risk  (11/26/2024)    Housing Stability Vital Sign     Unable to Pay for Housing in the Last Year: No     Number of Times Moved in the Last Year: 0     Homeless in the Last Year: No       Allergies:  Allergies   Allergen Reactions    Sulfa Antibiotics Hives       Physical Exam:  /67   Pulse 54   Temp 97.1 °F (36.2 °C) (Oral)   Resp 16   Ht 1.727 m (5' 8\")   Wt 61.2 kg (135 lb)   SpO2 100%   BMI 20.53 kg/m²   GENERAL: Alert, oriented x 3, not in acute distress.  HEENT: PERRLA; EOMI. Oropharynx clear.   NECK: Supple. No palpable cervical or supraclavicular lymphadenopathy.   LUNGS: Decreased air entry bilaterally.  CARDIOVASCULAR: Regular rate. No murmurs, rubs or gallops.   ABDOMEN: Soft. Non-tender, non-distended. Positive bowel sounds.  EXTREMITIES: Without clubbing, cyanosis, or edema.  Positive for left lower extremity deformity  NEUROLOGIC: No focal deficits.   ECOG PS 1         Impression/Plan:      Mr. Morrell is a 74-year-old gentleman, with a past medical history significant for hyperlipidemia, and hypertension, he is a former smoker, who had presented to the ED on 11/26/2024 with left hip pain status post mechanical fall, hip x-ray had revealed acute fracture involving the left femoral neck subcapital region with foreshortening and mild displacement.  Chest x-ray 
premix, 2,000 mg, IntraVENous, PRN  enoxaparin (LOVENOX) injection 40 mg, 40 mg, SubCUTAneous, Daily  ondansetron (ZOFRAN-ODT) disintegrating tablet 4 mg, 4 mg, Oral, Q8H PRN **OR** ondansetron (ZOFRAN) injection 4 mg, 4 mg, IntraVENous, Q6H PRN  polyethylene glycol (GLYCOLAX) packet 17 g, 17 g, Oral, Daily PRN  acetaminophen (TYLENOL) tablet 650 mg, 650 mg, Oral, Q6H PRN **OR** acetaminophen (TYLENOL) suppository 650 mg, 650 mg, Rectal, Q6H PRN  morphine sulfate (PF) injection 4 mg, 4 mg, IntraVENous, Q3H PRN  Allergies:  Sulfa antibiotics    Social History:    TOBACCO:   reports that he has quit smoking. His smoking use included cigarettes. He has never used smokeless tobacco.  ETOH:   reports that he does not currently use alcohol after a past usage of about 12.0 standard drinks of alcohol per week.  DRUGS:   reports no history of drug use.    Family History:   No family history on file.  REVIEW OF SYSTEMS:    CONSTITUTIONAL:  negative  EYES:  negative  HEENT:  negative  RESPIRATORY:  negative  CARDIOVASCULAR:  negative  GASTROINTESTINAL:  negative  GENITOURINARY:  negative  INTEGUMENT/BREAST:  negative  HEMATOLOGIC/LYMPHATIC:  negative  ALLERGIC/IMMUNOLOGIC:  positive for drug reactions  ENDOCRINE:  negative  MUSCULOSKELETAL:  positive for  pain and bone pain  NEUROLOGICAL:  negative  PHYSICAL EXAM:      Vitals:    VITALS:  /75   Pulse 88   Temp 97.9 °F (36.6 °C) (Oral)   Resp 18   Ht 1.727 m (5' 8\")   Wt 61.2 kg (135 lb)   SpO2 93%   BMI 20.53 kg/m²   24HR INTAKE/OUTPUT:    Intake/Output Summary (Last 24 hours) at 11/26/2024 2106  Last data filed at 11/26/2024 1515  Gross per 24 hour   Intake --   Output 1000 ml   Net -1000 ml       Constitutional:  Awake, alert, oriented, in NAD  HEENT:  PERRLA, normocephalic, atraumatic  Respiratory:  CTA  Cardiovascular/Edema:  RRR, normal S1, normal S2  Gastrointestinal:  Soft, flat, non-distended, non-tender  Neurologic:  Nonfocal  Skin:  warm, dry, no rashes, 
intolerance  Hematopoietic/Lymphatic: Denies bleeding problems and blood transfusions     OBJECTIVE:   /71   Pulse 86   Temp 98 °F (36.7 °C) (Oral)   Resp 20   Ht 1.727 m (5' 8\")   Wt 61.2 kg (135 lb)   SpO2 97%   BMI 20.53 kg/m²       SpO2 Readings from Last 1 Encounters:   11/26/24 97%        I/O:  No intake or output data in the 24 hours ending 11/26/24 1454      Physical Exam:  General: The patient is lying in bed comfortably without any distress.  Breathing is not labored  HEENT: [place]   Neck: supple without adenopathy  Cardiovascular: regular rate and rhythm without murmur or gallop  Respiratory: Clear to auscultation bilaterally without wheezing or crackles.  Air entry is symmetric  loose cough  Abdomen: soft, non-tender, non-distended, normal bowel sounds  Extremities: LLE deformity   Skin: no rash or lesion  Neurologic: alert and oriented x 3       Left leg - ttp hip.  ROM hip not tested due to fracture.  Sensation and motor intact distally.  Warm and well perfused.    Right leg and both arms - NTTP.  Warm and well perfused.  Sensation and motor intact.      Xrays - left displaced femoral neck fracture    Assessment 75 yo male with left femoral neck fracture    Plan  Pain control  Bed rest  Ladd  Admit to medicine  Ortho consult  Pulm consult  Plan for surgery with Randa for left hip patricia on 11/27 - risks and benefits discussed with family      Of note, greater than 50 minutes were spent on the floor and with the patient performing a history and physical, reviewing labs and imaging, and discussing plan.  Half the time was spent counseling and coordinating care.     Assessment:  
solid spiculated mass in the lingular segment left upper lobe with  adjacent subcentimeter satellite nodule.  Findings consistent with  bronchogenic carcinoma.  There is an enlarged left hilar lymph node likely  metastatic.  2. 1.2 cm spiculated nodule in the right upper lobe suspicious for 2nd,  primary neoplasm.  3. Healing non comminuted nondisplaced fractures of the anterior left 3rd and  4th ribs.          Labs:  Lab Results   Component Value Date/Time    WBC 6.0 11/26/2024 08:01 AM    RBC 3.13 11/26/2024 08:01 AM    HGB 12.0 11/26/2024 08:01 AM    HCT 33.0 11/26/2024 08:01 AM    .4 11/26/2024 08:01 AM    MCH 38.3 11/26/2024 08:01 AM    MCHC 36.4 11/26/2024 08:01 AM    RDW 12.1 11/26/2024 08:01 AM     11/26/2024 08:01 AM    MPV 9.0 11/26/2024 08:01 AM     Lab Results   Component Value Date/Time     11/26/2024 10:37 AM    K 5.8 11/26/2024 10:37 AM    CL 82 11/26/2024 10:37 AM    CO2 23 11/26/2024 10:37 AM    BUN 13 11/26/2024 10:37 AM    CREATININE 1.0 11/26/2024 10:37 AM    CALCIUM 9.0 11/26/2024 10:37 AM    GFRAA 48 04/25/2021 02:31 AM    LABGLOM 81 11/26/2024 10:37 AM       Assessment:  Left upper lobe lung mass, 5.1 cm  Right upper lobe spiculated nodule, 1.2 cm  Former nicotine dependence  Alcohol abuse  Left hip fracture  Hyponatremia     Plan:  Orders placed for CT guided ANIL mass bx in IR  Oncology consult  Nephrology following for hyponatremia, 3% infusion ordered  Orthopedic surgery following for left hip fx, for left hip hemiarthroplasty   Monitor for signs of alcohol withdraw symptoms, WA protocol ordered  Incentive spirometry postoperatively       Thank you for allowing me to participate in the care of Mike Morrell Jr..   Please feel free to call with questions.     This plan of care was reviewed in collaboration with Dr. Dukes    Electronically signed by ALLAN Marinelli - CNP on 11/26/2024 at 2:54 PM      Note: This report was completed utilizing computer voice 
\"PROBNP\"  Lab Results   Component Value Date/Time    WBC 6.4 12/04/2024 08:06 AM    RBC 2.26 12/04/2024 08:06 AM    HGB 8.4 12/04/2024 08:06 AM    HCT 24.9 12/04/2024 08:06 AM    .2 12/04/2024 08:06 AM    MCH 37.2 12/04/2024 08:06 AM    MCHC 33.7 12/04/2024 08:06 AM    RDW 12.6 12/04/2024 08:06 AM     12/04/2024 08:06 AM    MPV 9.6 12/04/2024 08:06 AM     Recent Labs     12/02/24  0438 12/03/24  0622 12/04/24  0806   ALKPHOS 54 59 67   ALT 7 6 9   AST 27 25 28   BILITOT 0.3 0.4 0.4     Lab Results   Component Value Date/Time    MG 2.0 12/04/2024 08:06 AM     Lab Results   Component Value Date/Time    PROTIME 10.2 11/27/2024 07:30 AM    INR 0.9 11/27/2024 07:30 AM     Lab Results   Component Value Date/Time    TSH 2.22 11/28/2024 02:00 AM     No components found for: \"CHLPL\"  Lab Results   Component Value Date    TRIG 104 11/30/2024     Lab Results   Component Value Date     11/30/2024     No components found for: \"LDLCALC\"        Active Hospital Problems    Diagnosis     Hyponatremia [E87.1]     Other fracture of left femur, initial encounter for closed fracture ([facility]) [S72.8X2A]     Alcohol dependence ([facility]) [F10.20]     Lung mass [R91.8]     Hypertension [I10]     Hyperlipidemia [E78.5]     Mixed bipolar II disorder ([facility]) [F31.81]              ASSESSMENT / PLAN:    #1 asymptomatic nonsustained ventricular tachycardia/8 beat with known history of RV outflow tract tachycardia previously treated with beta-blocker.  Restart metoprolol succinate    #2 ischemic cardiomyopathy and chronic systolic heart failure but has been a couple years since last assessment.  -  Echocardiogram pending  -Currently euvolemic.  Restart beta-blocker as above.  Chronic lisinopril currently on hold in setting of hyponatremia and hyperkalemia as per nephrology recommendations.  -Further GDMT recommendations following review of echo    #3 stable CAD/remote CABG  -Continue aspirin and statin.  -Restart beta-blocker as

## 2024-12-04 NOTE — CARE COORDINATION
CASE MANAGEMENT....No dc today per Dr Uriarte. Patient had 8 beats nsvt. Cardio consulted and patient was restarted on Toprol XL. Echo ordered. Left sided CT removed yesterday. Auth obtained for [employer]-good through 12/6/24. N [facility] in [facility]. Will Need ambulette/[id number] forms completed. Will follow.

## 2024-12-04 NOTE — PLAN OF CARE
Problem: ABCDS Injury Assessment  Goal: Absence of physical injury  12/3/2024 2104 by Susan Robles RN  Outcome: Progressing     Problem: Skin/Tissue Integrity  Goal: Absence of new skin breakdown  Description: 1.  Monitor for areas of redness and/or skin breakdown  2.  Assess vascular access sites hourly  3.  Every 4-6 hours minimum:  Change oxygen saturation probe site  4.  Every 4-6 hours:  If on nasal continuous positive airway pressure, respiratory therapy assess nares and determine need for appliance change or resting period.  12/3/2024 2104 by Susan Robles RN  Outcome: Progressing     Problem: Discharge Planning  Goal: Discharge to home or other facility with appropriate resources  12/3/2024 2104 by Susan Robles RN  Outcome: Progressing     Problem: Safety - Adult  Goal: Free from fall injury  12/3/2024 2104 by Susan Robles RN  Outcome: Progressing     Problem: Pain  Goal: Verbalizes/displays adequate comfort level or baseline comfort level  12/3/2024 2104 by Susan oRbles RN  Outcome: Progressing     Problem: Respiratory - Adult  Goal: Achieves optimal ventilation and oxygenation  12/3/2024 2104 by Susan Robles RN  Outcome: Progressing     Problem: Skin/Tissue Integrity - Adult  Goal: Skin integrity remains intact  12/3/2024 2104 by Susan Robles RN  Outcome: Progressing     Problem: Musculoskeletal - Adult  Goal: Return mobility to safest level of function  12/3/2024 2104 by Susan Robles RN  Outcome: Progressing     Problem: Musculoskeletal - Adult  Goal: Maintain proper alignment of affected body part  12/3/2024 2104 by Susan Robles RN  Outcome: Progressing     Problem: Musculoskeletal - Adult  Goal: Return ADL status to a safe level of function  12/3/2024 2104 by Susan Robles RN  Outcome: Progressing     Problem: Genitourinary - Adult  Goal: Absence of urinary retention  12/3/2024 2104 by Susan Robles RN  Outcome: Progressing     Problem: Genitourinary - Adult  Goal: Urinary catheter

## 2024-12-05 VITALS
OXYGEN SATURATION: 99 % | HEIGHT: 68 IN | BODY MASS INDEX: 20.75 KG/M2 | WEIGHT: 136.9 LBS | SYSTOLIC BLOOD PRESSURE: 93 MMHG | DIASTOLIC BLOOD PRESSURE: 60 MMHG | HEART RATE: 90 BPM | RESPIRATION RATE: 18 BRPM | TEMPERATURE: 98.3 F

## 2024-12-05 LAB
ALBUMIN SERPL-MCNC: 2.7 G/DL (ref 3.5–5.2)
ALP SERPL-CCNC: 59 U/L (ref 40–129)
ALT SERPL-CCNC: 6 U/L (ref 0–40)
ANION GAP SERPL CALCULATED.3IONS-SCNC: 9 MMOL/L (ref 7–16)
AST SERPL-CCNC: 23 U/L (ref 0–39)
BASOPHILS # BLD: 0.05 K/UL (ref 0–0.2)
BASOPHILS NFR BLD: 1 % (ref 0–2)
BILIRUB SERPL-MCNC: 0.5 MG/DL (ref 0–1.2)
BUN SERPL-MCNC: 15 MG/DL (ref 6–23)
CALCIUM SERPL-MCNC: 8.3 MG/DL (ref 8.6–10.2)
CHLORIDE SERPL-SCNC: 98 MMOL/L (ref 98–107)
CO2 SERPL-SCNC: 24 MMOL/L (ref 22–29)
CREAT SERPL-MCNC: 0.9 MG/DL (ref 0.7–1.2)
EOSINOPHIL # BLD: 0.2 K/UL (ref 0.05–0.5)
EOSINOPHILS RELATIVE PERCENT: 3 % (ref 0–6)
ERYTHROCYTE [DISTWIDTH] IN BLOOD BY AUTOMATED COUNT: 12.6 % (ref 11.5–15)
GFR, ESTIMATED: >90 ML/MIN/1.73M2
GLUCOSE SERPL-MCNC: 91 MG/DL (ref 74–99)
HCT VFR BLD AUTO: 23.9 % (ref 37–54)
HGB BLD-MCNC: 8 G/DL (ref 12.5–16.5)
IMM GRANULOCYTES # BLD AUTO: 0.06 K/UL (ref 0–0.58)
IMM GRANULOCYTES NFR BLD: 1 % (ref 0–5)
LYMPHOCYTES NFR BLD: 0.65 K/UL (ref 1.5–4)
LYMPHOCYTES RELATIVE PERCENT: 10 % (ref 20–42)
MCH RBC QN AUTO: 36.5 PG (ref 26–35)
MCHC RBC AUTO-ENTMCNC: 33.5 G/DL (ref 32–34.5)
MCV RBC AUTO: 109.1 FL (ref 80–99.9)
MONOCYTES NFR BLD: 0.99 K/UL (ref 0.1–0.95)
MONOCYTES NFR BLD: 15 % (ref 2–12)
NEUTROPHILS NFR BLD: 70 % (ref 43–80)
NEUTS SEG NFR BLD: 4.59 K/UL (ref 1.8–7.3)
PLATELET # BLD AUTO: 348 K/UL (ref 130–450)
PMV BLD AUTO: 9.4 FL (ref 7–12)
POTASSIUM SERPL-SCNC: 4.3 MMOL/L (ref 3.5–5)
PROT SERPL-MCNC: 5.4 G/DL (ref 6.4–8.3)
RBC # BLD AUTO: 2.19 M/UL (ref 3.8–5.8)
SODIUM SERPL-SCNC: 131 MMOL/L (ref 132–146)
SURGICAL PATHOLOGY REPORT: NORMAL
SURGICAL PATHOLOGY REPORT: NORMAL
WBC OTHER # BLD: 6.5 K/UL (ref 4.5–11.5)

## 2024-12-05 PROCEDURE — 36415 COLL VENOUS BLD VENIPUNCTURE: CPT

## 2024-12-05 PROCEDURE — 6370000000 HC RX 637 (ALT 250 FOR IP): Performed by: GENERAL ACUTE CARE HOSPITAL

## 2024-12-05 PROCEDURE — 97530 THERAPEUTIC ACTIVITIES: CPT

## 2024-12-05 PROCEDURE — 80053 COMPREHEN METABOLIC PANEL: CPT

## 2024-12-05 PROCEDURE — 6370000000 HC RX 637 (ALT 250 FOR IP): Performed by: INTERNAL MEDICINE

## 2024-12-05 PROCEDURE — 2580000003 HC RX 258: Performed by: GENERAL ACUTE CARE HOSPITAL

## 2024-12-05 PROCEDURE — 85025 COMPLETE CBC W/AUTO DIFF WBC: CPT

## 2024-12-05 PROCEDURE — 6360000002 HC RX W HCPCS: Performed by: GENERAL ACUTE CARE HOSPITAL

## 2024-12-05 PROCEDURE — 6370000000 HC RX 637 (ALT 250 FOR IP): Performed by: HOSPITALIST

## 2024-12-05 PROCEDURE — 6370000000 HC RX 637 (ALT 250 FOR IP): Performed by: STUDENT IN AN ORGANIZED HEALTH CARE EDUCATION/TRAINING PROGRAM

## 2024-12-05 PROCEDURE — 6370000000 HC RX 637 (ALT 250 FOR IP): Performed by: NURSE PRACTITIONER

## 2024-12-05 PROCEDURE — 97535 SELF CARE MNGMENT TRAINING: CPT

## 2024-12-05 RX ORDER — LANOLIN ALCOHOL/MO/W.PET/CERES
100 CREAM (GRAM) TOPICAL DAILY
DISCHARGE
Start: 2024-12-06

## 2024-12-05 RX ORDER — FERROUS SULFATE 325(65) MG
325 TABLET ORAL
DISCHARGE
Start: 2024-12-06

## 2024-12-05 RX ORDER — METOPROLOL SUCCINATE 25 MG/1
25 TABLET, EXTENDED RELEASE ORAL 2 TIMES DAILY
DISCHARGE
Start: 2024-12-05

## 2024-12-05 RX ORDER — FOLIC ACID 1 MG/1
1 TABLET ORAL DAILY
DISCHARGE
Start: 2024-12-06

## 2024-12-05 RX ORDER — ASPIRIN 81 MG/1
81 TABLET, CHEWABLE ORAL DAILY
DISCHARGE
Start: 2024-12-06

## 2024-12-05 RX ORDER — POLYETHYLENE GLYCOL 3350 17 G/17G
17 POWDER, FOR SOLUTION ORAL DAILY PRN
DISCHARGE
Start: 2024-12-05 | End: 2025-01-04

## 2024-12-05 RX ORDER — PSEUDOEPHEDRINE HCL 30 MG
100 TABLET ORAL 2 TIMES DAILY
DISCHARGE
Start: 2024-12-05

## 2024-12-05 RX ORDER — SODIUM CHLORIDE 1 G/1
2 TABLET ORAL
DISCHARGE
Start: 2024-12-05

## 2024-12-05 RX ORDER — ATORVASTATIN CALCIUM 40 MG/1
40 TABLET, FILM COATED ORAL NIGHTLY
DISCHARGE
Start: 2024-12-05

## 2024-12-05 RX ADMIN — TIZANIDINE 2 MG: 4 TABLET ORAL at 08:51

## 2024-12-05 RX ADMIN — Medication 2 G: at 12:20

## 2024-12-05 RX ADMIN — CYANOCOBALAMIN TAB 1000 MCG 1000 MCG: 1000 TAB at 08:52

## 2024-12-05 RX ADMIN — AMLODIPINE BESYLATE 5 MG: 5 TABLET ORAL at 08:52

## 2024-12-05 RX ADMIN — SODIUM CHLORIDE, PRESERVATIVE FREE 10 ML: 5 INJECTION INTRAVENOUS at 08:53

## 2024-12-05 RX ADMIN — OXYCODONE 10 MG: 5 TABLET ORAL at 06:44

## 2024-12-05 RX ADMIN — TAMSULOSIN HYDROCHLORIDE 0.4 MG: 0.4 CAPSULE ORAL at 08:52

## 2024-12-05 RX ADMIN — Medication 100 MG: at 08:51

## 2024-12-05 RX ADMIN — ASPIRIN 81 MG CHEWABLE TABLET 81 MG: 81 TABLET CHEWABLE at 08:51

## 2024-12-05 RX ADMIN — DOCUSATE SODIUM 100 MG: 100 CAPSULE, LIQUID FILLED ORAL at 08:52

## 2024-12-05 RX ADMIN — FOLIC ACID 1 MG: 1 TABLET ORAL at 08:51

## 2024-12-05 RX ADMIN — FERROUS SULFATE TAB 325 MG (65 MG ELEMENTAL FE) 325 MG: 325 (65 FE) TAB at 08:52

## 2024-12-05 RX ADMIN — Medication 2 G: at 08:52

## 2024-12-05 RX ADMIN — METOPROLOL SUCCINATE 25 MG: 25 TABLET, EXTENDED RELEASE ORAL at 08:52

## 2024-12-05 RX ADMIN — APIXABAN 5 MG: 5 TABLET, FILM COATED ORAL at 12:20

## 2024-12-05 RX ADMIN — ENOXAPARIN SODIUM 40 MG: 100 INJECTION SUBCUTANEOUS at 08:52

## 2024-12-05 ASSESSMENT — PAIN SCALES - GENERAL: PAINLEVEL_OUTOF10: 8

## 2024-12-05 ASSESSMENT — PAIN DESCRIPTION - LOCATION: LOCATION: HIP

## 2024-12-05 ASSESSMENT — PAIN DESCRIPTION - ORIENTATION: ORIENTATION: LEFT

## 2024-12-05 ASSESSMENT — PAIN DESCRIPTION - DESCRIPTORS: DESCRIPTORS: ACHING;SORE

## 2024-12-05 NOTE — PLAN OF CARE
Problem: ABCDS Injury Assessment  Goal: Absence of physical injury  12/5/2024 1346 by Grisel Zapien RN  Outcome: Completed     Problem: Skin/Tissue Integrity  Goal: Absence of new skin breakdown  Description: 1.  Monitor for areas of redness and/or skin breakdown  2.  Assess vascular access sites hourly  3.  Every 4-6 hours minimum:  Change oxygen saturation probe site  4.  Every 4-6 hours:  If on nasal continuous positive airway pressure, respiratory therapy assess nares and determine need for appliance change or resting period.  12/5/2024 1346 by Grisel Zapien RN  Outcome: Completed     Problem: Discharge Planning  Goal: Discharge to home or other facility with appropriate resources  12/5/2024 1346 by Grisel Zapien RN  Outcome: Completed     Problem: Safety - Adult  Goal: Free from fall injury  12/5/2024 1346 by Grisel Zapien RN  Outcome: Completed     Problem: Pain  Goal: Verbalizes/displays adequate comfort level or baseline comfort level  12/5/2024 1346 by Grisel Zapien RN  Outcome: Completed     Problem: Respiratory - Adult  Goal: Achieves optimal ventilation and oxygenation  12/5/2024 1346 by Grisel Zapien RN  Outcome: Completed     Problem: Skin/Tissue Integrity - Adult  Goal: Skin integrity remains intact  12/5/2024 1346 by Grisel Zapien RN  Outcome: Completed     Problem: Musculoskeletal - Adult  Goal: Return mobility to safest level of function  12/5/2024 1346 by Grisel Zapien RN  Outcome: Completed     Problem: Musculoskeletal - Adult  Goal: Maintain proper alignment of affected body part  12/5/2024 1346 by Grisel Zapien RN  Outcome: Completed     Problem: Musculoskeletal - Adult  Goal: Return ADL status to a safe level of function  12/5/2024 1346 by Grisel Zapien RN  Outcome: Completed     Problem: Genitourinary - Adult  Goal: Absence of urinary retention  12/5/2024 1346 by Grisel Zapien RN  Outcome: Completed     Problem: Genitourinary - Adult  Goal: Urinary catheter

## 2024-12-05 NOTE — PROGRESS NOTES
Nico Lam M.D.,[facility]  Yoav Garduno D.O., FPRAVEENA., [facility]  Kimberlyn Mcmahan M.D.  Ashia Guevara M.D.   Abilio Dukes D.O.  Cameron Michelle M.D.         Daily Pulmonary Progress Note    Patient:  Mike Morrell Jr. 74 y.o. male MRN: [id number]            Synopsis     We are following patient for lung mass    \"CC\" leg injury    Code status: FULL CODE      Subjective      Patient was seen and examined.Repeat chest x-ray today, lung remains expanded with no evidence of pneumothorax post chest tube removal.  Not requiring oxygen SpO2 99% on room air.  8 beats VT overnight, cardiology consult placed. Echo completed.   Magnesium 2.0.  Path still pending from CT-guided biopsy of left lingular lung mass 11/27/2024.      Review of Systems:  Constitutional: weight loss   Skin: Denies pigmentation, dark lesions, and rashes   HEENT: [place]   Cardiovascular: Denies palpitations, chest pain, and chest pressure.  Respiratory: productive cough   Gastrointestinal: Denies nausea, vomiting, poor appetite, diarrhea, heartburn or reflux  Genitourinary: Denies dysuria, frequency, urgency or hematuria  Musculoskeletal: left hip pain  Neurological: Denies dizziness, vertigo, headache, and focal weakness  Psychological: Denies anxiety and depression  Endocrine: Denies heat intolerance and cold intolerance  Hematopoietic/Lymphatic: Denies bleeding problems and blood transfusions    24-hour events:  Chest tube removal 12/3/2024    Objective   OBJECTIVE:   BP (!) 140/79   Pulse 98   Temp 98.2 °F (36.8 °C) (Axillary)   Resp 16   Ht 1.727 m (5' 8\")   Wt 59 kg (130 lb)   SpO2 99%   BMI 19.77 kg/m²   SpO2 Readings from Last 1 Encounters:   12/04/24 99%        I/O:    Intake/Output Summary (Last 24 hours) at 12/4/2024 0935  Last data filed at 12/3/2024 1831  Gross per 24 hour   Intake 420 ml   Output 575 ml   Net -155 ml           CURRENT MEDS :  Scheduled Meds:   sodium chloride  2 g Oral TID WC    docusate sodium  100 mg Oral 
           Nico Lam M.D.,[facility]  Yoav Garduno D.O., CHRISTA., [facility]  Kimberlyn Mcmahan M.D.  Ashia Guevara M.D.   Abilio Dukes D.O.  Cameron Michelle M.D.         Daily Pulmonary Progress Note    Patient:  Mike Morrell Jr. 74 y.o. male MRN: [id number]            Synopsis     We are following patient for lung mass    \"CC\" leg injury    Code status: FULL CODE      Subjective      Patient was seen and examined sitting up in bed eating lunch.  He is on 1 L nasal cannula.  He is doing well but reporting that his hip aches.  Chest tube suction removed this am. Chest x-ray reviewed. Will clamp tube today.    Review of Systems:  Constitutional: weight loss   Skin: Denies pigmentation, dark lesions, and rashes   HEENT: [place]   Cardiovascular: Denies palpitations, chest pain, and chest pressure.  Respiratory: productive cough   Gastrointestinal: Denies nausea, vomiting, poor appetite, diarrhea, heartburn or reflux  Genitourinary: Denies dysuria, frequency, urgency or hematuria  Musculoskeletal: left hip pain  Neurological: Denies dizziness, vertigo, headache, and focal weakness  Psychological: Denies anxiety and depression  Endocrine: Denies heat intolerance and cold intolerance  Hematopoietic/Lymphatic: Denies bleeding problems and blood transfusions    24-hour events:  Chest tube clamped     Objective   OBJECTIVE:   BP (!) 146/74   Pulse 100   Temp 98.6 °F (37 °C) (Temporal)   Resp 18   Ht 1.727 m (5' 8\")   Wt 59 kg (130 lb)   SpO2 96%   BMI 19.77 kg/m²   SpO2 Readings from Last 1 Encounters:   12/02/24 96%        I/O:    Intake/Output Summary (Last 24 hours) at 12/2/2024 1109  Last data filed at 12/2/2024 0558  Gross per 24 hour   Intake --   Output 1022 ml   Net -1022 ml           CURRENT MEDS :  Scheduled Meds:   sodium chloride  1 g Oral TID     sodium chloride flush  5-40 mL IntraVENous 2 times per day    polyethylene glycol  17 g Oral Daily    enoxaparin  40 mg SubCUTAneous Daily    lidocaine  5 mL 
      Nico Lam M.D.  Yoav Garduno D.O.  Kimberlyn Mcmahan M.D.  Ashia Guevara M.D.   Abilio Dukes D.O.  Cameron Michelle M.D.           Daily Pulmonary Progress Note    Patient:  Mike Morrell Jr. 74 y.o. male MRN: [id number]     Date of Service: 11/29/2024        Subjective      Patient was seen and examined.    Denies any shortness of breath.  Minimal cough.    Objective   Vitals: BP (!) 155/80   Pulse 86   Temp 97.5 °F (36.4 °C) (Oral)   Resp 18   Ht 1.727 m (5' 8\")   Wt 61.2 kg (135 lb)   SpO2 99%   BMI 20.53 kg/m²     I/O:    Intake/Output Summary (Last 24 hours) at 11/29/2024 1620  Last data filed at 11/29/2024 1421  Gross per 24 hour   Intake --   Output 1800 ml   Net -1800 ml       CURRENT MEDS :  Scheduled Meds:   ferrous sulfate  325 mg Oral Daily with breakfast    folic acid  1 mg Oral Daily    thiamine  100 mg Oral Daily    tamsulosin  0.4 mg Oral Daily    amLODIPine  5 mg Oral Daily    [Held by provider] lisinopril  20 mg Oral Daily    tiZANidine  2 mg Oral TID    sodium chloride flush  5-40 mL IntraVENous 2 times per day    enoxaparin  40 mg SubCUTAneous Daily       Continuous Infusions:   sodium chloride         PRN Meds:  albuterol, oxyCODONE-acetaminophen, melatonin, LORazepam **OR** [DISCONTINUED] LORazepam **OR** LORazepam **OR** [DISCONTINUED] LORazepam **OR** LORazepam **OR** [DISCONTINUED] LORazepam **OR** LORazepam **OR** [DISCONTINUED] LORazepam, nitroGLYCERIN, diazePAM, sodium chloride flush, sodium chloride, potassium chloride **OR** potassium alternative oral replacement **OR** [DISCONTINUED] potassium chloride, magnesium sulfate, ondansetron **OR** ondansetron, polyethylene glycol, acetaminophen **OR** acetaminophen, morphine      Physical Exam:  Physical Exam  Constitutional:       General: He is not in acute distress.  HENT:      Head: Normocephalic and atraumatic.      Mouth/Throat:      Pharynx: No oropharyngeal exudate.   Eyes:      General: No scleral icterus.     
      Nico Lam M.D.  Yoav Garduno D.O.  Kimberlyn Mcmahan M.D.  Ashia Guevara M.D.   Abilio Dukes D.O.  Cameron Michelle M.D.           Daily Pulmonary Progress Note    Patient:  Mike Morrell Jr. 74 y.o. male MRN: [id number]     Date of Service: 11/27/2024        Subjective      Patient was seen and examined.    CT-guided biopsy today with postop pneumothorax.    Objective   Vitals: /67   Pulse 54   Temp 97.1 °F (36.2 °C) (Oral)   Resp 16   Ht 1.727 m (5' 8\")   Wt 61.2 kg (135 lb)   SpO2 100%   BMI 20.53 kg/m²     I/O:    Intake/Output Summary (Last 24 hours) at 11/27/2024 1746  Last data filed at 11/27/2024 0403  Gross per 24 hour   Intake 105.15 ml   Output 550 ml   Net -444.85 ml       CURRENT MEDS :  Scheduled Meds:   albuterol  2.5 mg Nebulization 4x Daily RT    thiamine  100 mg Oral Daily    sodium zirconium cyclosilicate  10 g Oral Once    tamsulosin  0.4 mg Oral Daily    amLODIPine  5 mg Oral Daily    [Held by provider] lisinopril  20 mg Oral Daily    tiZANidine  2 mg Oral TID    sodium chloride flush  5-40 mL IntraVENous 2 times per day    enoxaparin  40 mg SubCUTAneous Daily       Continuous Infusions:   sodium chloride         PRN Meds:  LORazepam **OR** [DISCONTINUED] LORazepam **OR** LORazepam **OR** [DISCONTINUED] LORazepam **OR** LORazepam **OR** [DISCONTINUED] LORazepam **OR** LORazepam **OR** [DISCONTINUED] LORazepam, nitroGLYCERIN, HYDROcodone-acetaminophen, diazePAM, sodium chloride flush, sodium chloride, potassium chloride **OR** potassium alternative oral replacement **OR** [DISCONTINUED] potassium chloride, magnesium sulfate, ondansetron **OR** ondansetron, polyethylene glycol, acetaminophen **OR** acetaminophen, morphine      Physical Exam:  Physical Exam  Constitutional:       General: He is not in acute distress.  HENT:      Head: Normocephalic and atraumatic.      Mouth/Throat:      Pharynx: No oropharyngeal exudate.   Eyes:      General: No scleral icterus.     
4 Eyes Skin Assessment     NAME:  Mike Morrell Jr.  YOB: 1950  MEDICAL RECORD NUMBER:  [id number]    The patient is being assessed for  Admission    I agree that at least one RN has performed a thorough Head to Toe Skin Assessment on the patient. ALL assessment sites listed below have been assessed.      Areas assessed by both nurses:    Head, Face, Ears, Shoulders, Back, Chest, Arms, Elbows, Hands, Sacrum. Buttock, Coccyx, Ischium, Legs. Feet and Heels, and Under Medical Devices         Does the Patient have a Wound? No noted wound(s)       Blue Prevention initiated by RN: Yes  Wound Care Orders initiated by RN: No    Pressure Injury (Stage 3,4, Unstageable, DTI, NWPT, and Complex wounds) if present, place Wound referral order by RN under : No    New Ostomies, if present place, Ostomy referral order under : No     Nurse 1 eSignature: Electronically signed by Fito Flores RN on 11/26/24 at 6:49 PM EST    **SHARE this note so that the co-signing nurse can place an eSignature**    Nurse 2 eSignature: Electronically signed by Radha Phillips RN on 11/26/24 at 6:50 PM EST  
Chart review, patient seen, full consultation to follow.    Briefly Mr. Morrell is a 74-year-old  man with history of heavy alcohol abuse, HTN, CAD status post CABG, sick sinus syndrome status post dual-chamber pacemaker placement in 2006 for symptomatic bradycardia status post extraction due to infection, hyperlipidemia, severe diverticulosis, who was admitted on 11/26/2024 after he presented to the ER complaining of left hip pain he reported having a fall the day before, he was found to have left femoral neck fracture, at the same time chest x-ray showed masslike density along the left lateral margin of the heart of 6.4 cm, CT chest with IV contrast showed a 5.1 cm solid spiculated mass in the lingular segment of the left upper lobe consistent with bronchogenic carcinoma.  Initial blood work in the ER showed a sodium level of 117 mEq/L, reason for this consultation.  Patient reports drinking large amount of beer throughout the day along with oral fluids including water.  He barely eats and mostly have a small amount of food at dinner but many times he skips dinner.  Patient denies any nausea or vomiting.  His medications prior to admission included lisinopril, Norco.      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  Urine molality 241, urine sodium 30, cortisol level 23.6 patient had 1 L urine in the ER with spontaneous correction of sodium to 121.    Hyperkalemia, 2/2 ACE inhibition, Lokelma given  HTN, to hold lisinopril, continue amlodipine  Macrocytic anemia, to obtain B12 and folate level  -------------------------------------------  Left upper lobe lung mass, likely bronchogenic carcinoma  History of CAD status post CABG  Alcohol abuse  Left hip fracture status post fall    Plan:    Avoid rapid correction of sodium <8/24 hours  Lokelma 10 g p.o. given in the ER  Monitor potassium level  Monitor urine output call if urine volume 
Database initiated pharmacy and medications verified with the patient and daughter at bedside. He is A&O comes in from home alone. He is here for a fall. Daughter states he does not take his medications like he should. He is hard of hearing. States he has diarrhea everyday.   
Department of Internal Medicine  Nephrology Nurse Practitioner Consult Note    Events reviewed.    SUBJECTIVE: We are following Mike Morrell Jr. for hyponatremia. Patient reports pain.    PHYSICAL EXAM:      Vitals:    VITALS:  /80   Pulse 85   Temp 97.8 °F (36.6 °C) (Oral)   Resp 16   Ht 1.727 m (5' 8\")   Wt 61.2 kg (135 lb)   SpO2 100%   BMI 20.53 kg/m²   24HR INTAKE/OUTPUT:    Intake/Output Summary (Last 24 hours) at 11/27/2024 0857  Last data filed at 11/27/2024 0403  Gross per 24 hour   Intake 105.15 ml   Output 1550 ml   Net -1444.85 ml     Scheduled Meds:   ceFAZolin  2,000 mg IntraVENous On Call to OR    tranexamic acid-NaCl  1,000 mg IntraVENous On Call to OR    thiamine  100 mg Oral Daily    sodium zirconium cyclosilicate  10 g Oral Once    tamsulosin  0.4 mg Oral Daily    amLODIPine  5 mg Oral Daily    [Held by provider] lisinopril  20 mg Oral Daily    tiZANidine  2 mg Oral TID    sodium chloride flush  5-40 mL IntraVENous 2 times per day    enoxaparin  40 mg SubCUTAneous Daily     Continuous Infusions:   sodium chloride       PRN Meds:.LORazepam **OR** [DISCONTINUED] LORazepam **OR** LORazepam **OR** [DISCONTINUED] LORazepam **OR** LORazepam **OR** [DISCONTINUED] LORazepam **OR** LORazepam **OR** [DISCONTINUED] LORazepam, nitroGLYCERIN, HYDROcodone-acetaminophen, diazePAM, sodium chloride flush, sodium chloride, potassium chloride **OR** potassium alternative oral replacement **OR** [DISCONTINUED] potassium chloride, magnesium sulfate, ondansetron **OR** ondansetron, polyethylene glycol, acetaminophen **OR** acetaminophen, morphine     Constitutional:  Awake, alert, oriented, in NAD  HEENT:  PERRLA, normocephalic, atraumatic  Respiratory:  CTA  Cardiovascular/Edema:  RRR, normal S1, normal S2  Gastrointestinal:  Soft, flat, non-distended, non-tender  Neurologic:  Nonfocal  Skin:  warm, dry, no rashes, no lesions    DATA:    CBC:   Lab Results   Component Value Date/Time    WBC 5.7 
Department of Internal Medicine  Nephrology Progress Note    Events reviewed.    SUBJECTIVE: We are following Mike Morrell Jr. for hyponatremia. Patient reports no new complaints.     PHYSICAL EXAM:      Vitals:    VITALS:  BP (!) 140/79   Pulse 98   Temp 98.2 °F (36.8 °C) (Axillary)   Resp 16   Ht 1.727 m (5' 8\")   Wt 59 kg (130 lb)   SpO2 99%   BMI 19.77 kg/m²   24HR INTAKE/OUTPUT:    Intake/Output Summary (Last 24 hours) at 12/4/2024 0916  Last data filed at 12/3/2024 1831  Gross per 24 hour   Intake 420 ml   Output 575 ml   Net -155 ml     Scheduled Meds:   sodium chloride  2 g Oral TID WC    docusate sodium  100 mg Oral BID    atorvastatin  40 mg Oral Nightly    aspirin  81 mg Oral Daily    sodium chloride flush  5-40 mL IntraVENous 2 times per day    polyethylene glycol  17 g Oral Daily    enoxaparin  40 mg SubCUTAneous Daily    lidocaine  5 mL IntraDERmal Once    vitamin B-12  1,000 mcg Oral Daily    ferrous sulfate  325 mg Oral Daily with breakfast    folic acid  1 mg Oral Daily    thiamine  100 mg Oral Daily    tamsulosin  0.4 mg Oral Daily    amLODIPine  5 mg Oral Daily    [Held by provider] lisinopril  20 mg Oral Daily    tiZANidine  2 mg Oral TID     Continuous Infusions:   sodium chloride       PRN Meds:.sodium chloride flush, sodium chloride, ondansetron **OR** ondansetron, oxyCODONE **OR** oxyCODONE, morphine **OR** morphine, albuterol, melatonin, LORazepam **OR** [DISCONTINUED] LORazepam **OR** LORazepam **OR** [DISCONTINUED] LORazepam **OR** LORazepam **OR** [DISCONTINUED] LORazepam **OR** LORazepam **OR** [DISCONTINUED] LORazepam, nitroGLYCERIN, potassium chloride **OR** potassium alternative oral replacement **OR** [DISCONTINUED] potassium chloride, magnesium sulfate, polyethylene glycol, acetaminophen **OR** acetaminophen     Constitutional:  Awake, alert, oriented, in NAD  HEENT:  PERRLA, normocephalic, atraumatic  Respiratory:  CTA  Cardiovascular/Edema:  RRR, normal S1, normal 
Department of Internal Medicine  Nephrology Progress Note    Events reviewed.    SUBJECTIVE: We are following Mike Morrell Jr. for hyponatremia. Patient reports no new complaints.     PHYSICAL EXAM:      Vitals:    VITALS:  BP 93/60   Pulse 90   Temp 98.3 °F (36.8 °C) (Oral)   Resp 18   Ht 1.727 m (5' 8\")   Wt 62.1 kg (136 lb 14.4 oz)   SpO2 99%   BMI 20.82 kg/m²   24HR INTAKE/OUTPUT:    Intake/Output Summary (Last 24 hours) at 12/5/2024 1114  Last data filed at 12/5/2024 0644  Gross per 24 hour   Intake 180 ml   Output 900 ml   Net -720 ml     Scheduled Meds:   apixaban  5 mg Oral BID    sodium chloride  2 g Oral TID WC    metoprolol succinate  25 mg Oral BID    docusate sodium  100 mg Oral BID    atorvastatin  40 mg Oral Nightly    aspirin  81 mg Oral Daily    sodium chloride flush  5-40 mL IntraVENous 2 times per day    polyethylene glycol  17 g Oral Daily    lidocaine  5 mL IntraDERmal Once    vitamin B-12  1,000 mcg Oral Daily    ferrous sulfate  325 mg Oral Daily with breakfast    folic acid  1 mg Oral Daily    thiamine  100 mg Oral Daily    tamsulosin  0.4 mg Oral Daily    amLODIPine  5 mg Oral Daily    [Held by provider] lisinopril  20 mg Oral Daily    tiZANidine  2 mg Oral TID     Continuous Infusions:   sodium chloride       PRN Meds:.sodium chloride flush, sodium chloride, ondansetron **OR** ondansetron, oxyCODONE **OR** oxyCODONE, morphine **OR** morphine, albuterol, melatonin, LORazepam **OR** [DISCONTINUED] LORazepam **OR** LORazepam **OR** [DISCONTINUED] LORazepam **OR** LORazepam **OR** [DISCONTINUED] LORazepam **OR** LORazepam **OR** [DISCONTINUED] LORazepam, nitroGLYCERIN, potassium chloride **OR** potassium alternative oral replacement **OR** [DISCONTINUED] potassium chloride, magnesium sulfate, polyethylene glycol, acetaminophen **OR** acetaminophen     Constitutional:  Awake, alert, oriented, in NAD  HEENT:  PERRLA, normocephalic, atraumatic  Respiratory:  CTA  Cardiovascular/Edema:  
Department of Internal Medicine  Nephrology Progress Note    Events reviewed.    SUBJECTIVE: We are following Mike Morrell Jr. for hyponatremia. Patient reports pain.    PHYSICAL EXAM:      Vitals:    VITALS:  /60   Pulse 74   Temp 98.3 °F (36.8 °C) (Oral)   Resp 18   Ht 1.727 m (5' 8\")   Wt 61.2 kg (135 lb)   SpO2 99%   BMI 20.53 kg/m²   24HR INTAKE/OUTPUT:    Intake/Output Summary (Last 24 hours) at 11/29/2024 0756  Last data filed at 11/29/2024 0623  Gross per 24 hour   Intake --   Output 1550 ml   Net -1550 ml     Scheduled Meds:   ferrous sulfate  325 mg Oral Daily with breakfast    albuterol  2.5 mg Nebulization 4x Daily RT    folic acid  1 mg Oral Daily    thiamine  100 mg Oral Daily    tamsulosin  0.4 mg Oral Daily    amLODIPine  5 mg Oral Daily    [Held by provider] lisinopril  20 mg Oral Daily    tiZANidine  2 mg Oral TID    sodium chloride flush  5-40 mL IntraVENous 2 times per day    enoxaparin  40 mg SubCUTAneous Daily     Continuous Infusions:   3% sodium chloride 25 mL/hr (11/29/24 0517)    sodium chloride       PRN Meds:.oxyCODONE-acetaminophen, melatonin, LORazepam **OR** [DISCONTINUED] LORazepam **OR** LORazepam **OR** [DISCONTINUED] LORazepam **OR** LORazepam **OR** [DISCONTINUED] LORazepam **OR** LORazepam **OR** [DISCONTINUED] LORazepam, nitroGLYCERIN, diazePAM, sodium chloride flush, sodium chloride, potassium chloride **OR** potassium alternative oral replacement **OR** [DISCONTINUED] potassium chloride, magnesium sulfate, ondansetron **OR** ondansetron, polyethylene glycol, acetaminophen **OR** acetaminophen, morphine     Constitutional:  Awake, alert, oriented, in NAD  HEENT:  PERRLA, normocephalic, atraumatic  Respiratory:  CTA  Cardiovascular/Edema:  RRR, normal S1, normal S2  Gastrointestinal:  Soft, flat, non-distended, non-tender  Neurologic:  Nonfocal  Skin:  warm, dry, no rashes, no lesions    DATA:    CBC:   Lab Results   Component Value Date/Time    WBC 5.9 11/29/2024 
Department of Internal Medicine  Nephrology Progress Note    Events reviewed.    SUBJECTIVE: We are following Mike Morrell Jr. for hyponatremia. Patient reports pain.    PHYSICAL EXAM:      Vitals:    VITALS:  /71   Pulse 87   Temp 98.4 °F (36.9 °C) (Oral)   Resp 18   Ht 1.727 m (5' 8\")   Wt 59 kg (130 lb 1.1 oz)   SpO2 98%   BMI 19.78 kg/m²   24HR INTAKE/OUTPUT:    Intake/Output Summary (Last 24 hours) at 12/1/2024 1703  Last data filed at 12/1/2024 1039  Gross per 24 hour   Intake --   Output 225 ml   Net -225 ml     Scheduled Meds:   sodium chloride  1 g Oral TID WC    sodium chloride flush  5-40 mL IntraVENous 2 times per day    polyethylene glycol  17 g Oral Daily    enoxaparin  40 mg SubCUTAneous Daily    lidocaine  5 mL IntraDERmal Once    vitamin B-12  1,000 mcg Oral Daily    ferrous sulfate  325 mg Oral Daily with breakfast    folic acid  1 mg Oral Daily    thiamine  100 mg Oral Daily    tamsulosin  0.4 mg Oral Daily    amLODIPine  5 mg Oral Daily    [Held by provider] lisinopril  20 mg Oral Daily    tiZANidine  2 mg Oral TID     Continuous Infusions:   sodium chloride       PRN Meds:.sodium chloride flush, sodium chloride, ondansetron **OR** ondansetron, oxyCODONE **OR** oxyCODONE, morphine **OR** morphine, albuterol, melatonin, LORazepam **OR** [DISCONTINUED] LORazepam **OR** LORazepam **OR** [DISCONTINUED] LORazepam **OR** LORazepam **OR** [DISCONTINUED] LORazepam **OR** LORazepam **OR** [DISCONTINUED] LORazepam, nitroGLYCERIN, potassium chloride **OR** potassium alternative oral replacement **OR** [DISCONTINUED] potassium chloride, magnesium sulfate, polyethylene glycol, acetaminophen **OR** acetaminophen     Constitutional:  Awake, alert, oriented, in NAD  HEENT:  PERRLA, normocephalic, atraumatic  Respiratory:  CTA  Cardiovascular/Edema:  RRR, normal S1, normal S2  Gastrointestinal:  Soft, flat, non-distended, non-tender  Neurologic:  Nonfocal  Skin:  warm, dry, no rashes, no 
Discharge with PAS wheelchair arranged for 2pm  
Dr Dukes notified of post chest tube insertion CXR.   
Dr Michael Rodriguez with [facility] oncology notified of consult via secure text  [place] rat[place]iff  
Dr Montes notified of new consult via ans service.  
Dr. Casas notified of sodium result. New order received.  
Informed consent for Chest tube is signed and in chart.    
Informed consent is signed and in chart.    
Internal Medicine Progress Note    Patient's name: Mike Morrell Jr.  : 1950  Chief complaints (on day of admission): Leg Injury (Brought to ED by EMS for a fall. Left leg shorter than right)  Admission date: 2024  Date of service: 2024   Room: [address]  Primary care physician: Eric Jackson MD  Reason for visit: Follow-up for hyponatremia, hip fracture    Subjective  Mike is seen sitting up in bed awake and alert in no distress.  He reports feeling well today just a little tired.  He still has some achiness in his hip but feels like the pain medications are effective.  He denies any nausea or vomiting.  Denies any shortness of breath or difficulty breathing.  Chest tube remains to suction at this time with plans for repeat chest x-ray this morning.  Nursing reports that he had 10 cc output overnight from his chest tube.  No other issues or concerns from nursing.     Review of Systems  Full 10 point review of systems negative unless mentioned above.    Hospital Medications  Current Facility-Administered Medications   Medication Dose Route Frequency Provider Last Rate Last Admin    sodium chloride tablet 1 g  1 g Oral TID  Prem Casas MD   1 g at 24 1639    sodium chloride flush 0.9 % injection 5-40 mL  5-40 mL IntraVENous 2 times per day César Miller DO   10 mL at 24    sodium chloride flush 0.9 % injection 5-40 mL  5-40 mL IntraVENous PRN César Miller DO   10 mL at 24 1724    0.9 % sodium chloride infusion   IntraVENous PRN César Miller DO        ondansetron (ZOFRAN-ODT) disintegrating tablet 4 mg  4 mg Oral Q8H PRN César Miller DO        Or    ondansetron (ZOFRAN) injection 4 mg  4 mg IntraVENous Q6H PRN César Miller DO        oxyCODONE (ROXICODONE) immediate release tablet 5 mg  5 mg Oral Q4H PRN César Miller DO   5 mg at 24 1816    Or    oxyCODONE (ROXICODONE) immediate release tablet 10 mg  10 mg Oral 
Internal Medicine Progress Note    Patient's name: Mike Morrell Jr.  : 1950  Chief complaints (on day of admission): Leg Injury (Brought to ED by EMS for a fall. Left leg shorter than right)  Admission date: 2024  Date of service: 2024   Room: [address] INTERMEDIATE  Primary care physician: Eric Jackson MD  Reason for visit: Follow-up for numerous issues    Subjective  Mike is off the floor this morning for left hip patricia arthroplasty. His sodium improved to 131, K+ 5.3, nephrology following for electrolyte management. No other issues or concerns from nursing.  Unable to interview patient or perform physical examination as again he is off the floor for surgery.  Chart reviewed, labs reviewed.    Review of Systems  Full 10 point review of systems negative unless mentioned above.    Hospital Medications  Current Facility-Administered Medications   Medication Dose Route Frequency Provider Last Rate Last Admin    albuterol (PROVENTIL) (2.5 MG/3ML) 0.083% nebulizer solution 2.5 mg  2.5 mg Nebulization Q6H PRN Abilio Dukes DO        ferrous sulfate (IRON 325) tablet 325 mg  325 mg Oral Daily with breakfast El Ani Rodriguez MD        oxyCODONE-acetaminophen (PERCOCET) 5-325 MG per tablet 1 tablet  1 tablet Oral Q6H PRN Gus Uriarte, DO   1 tablet at 24 0622    folic acid (FOLVITE) tablet 1 mg  1 mg Oral Daily Gus Uriarte, DO   1 mg at 24 0802    melatonin tablet 3 mg  3 mg Oral Nightly PRN Gus Uriarte, DO   3 mg at 24 2201    thiamine tablet 100 mg  100 mg Oral Daily Yeyo Mcgovern, DO   100 mg at 24 0802    LORazepam (ATIVAN) tablet 1 mg  1 mg Oral Q1H PRN Froilan Yeyo, DO   1 mg at 24 0222    Or    LORazepam (ATIVAN) tablet 2 mg  2 mg Oral Q1H PRN Yeyo Mcgovern,         Or    LORazepam (ATIVAN) tablet 3 mg  3 mg Oral Q1H PRN Yeyo Mcgovern, DO        Or    LORazepam (ATIVAN) tablet 4 mg  4 mg Oral Q1H PRN Yeyo Mcgovern, DO        tamsulosin 
Internal Medicine Progress Note    Patient's name: Mike Morrell Jr.  : 1950  Chief complaints (on day of admission): Leg Injury (Brought to ED by EMS for a fall. Left leg shorter than right)  Admission date: 2024  Date of service: 2024   Room: [address] INTERMEDIATE 1  Primary care physician: Eric Jackson MD  Reason for visit: Follow-up for hyponatremia, hip fracture    Subjective  Mike is seen sitting up in bed awake and alert, tearful this morning. He reports being a little depressed with still being in the hospital. He is having some difficulty with arranging care for his dog at home and upset that his daughter had to push her vacation back. He reports that he just wants to get to rehab so he can get back home. He denies any nausea or vomiting. Denies any fever or chills. CT was removed yesterday and feels like his breathing is good; still with a slight cough and clear sputum production. Nursing reports that he did have 8 betas of VT overnight and was asymptomatic. No other issues or concerns from nursing.    Review of Systems  Full 10 point review of systems negative unless mentioned above.    Hospital Medications  Current Facility-Administered Medications   Medication Dose Route Frequency Provider Last Rate Last Admin    sodium chloride tablet 2 g  2 g Oral TID  Prem Casas MD        docusate sodium (COLACE) capsule 100 mg  100 mg Oral BID Shahrzad Dodd APRN - CNP   100 mg at 24    atorvastatin (LIPITOR) tablet 40 mg  40 mg Oral Nightly Shahrzad Dodd APRN - CNP        aspirin chewable tablet 81 mg  81 mg Oral Daily Shahrzad Dodd APRN - CNP   81 mg at 24 0826    sodium chloride flush 0.9 % injection 5-40 mL  5-40 mL IntraVENous 2 times per day César Miller, DO   10 mL at 24    sodium chloride flush 0.9 % injection 5-40 mL  5-40 mL IntraVENous PRN César Miller, DO   10 mL at 24 1724    0.9 % sodium chloride infusion   IntraVENous 
Left chest tube site dressing changed per orders. Pt tolerated well.   
[facility] [address] INTERMEDIATE 1  [address] [facility] [address]  Dept: [phone]  Loc: [phone]  Hematology/oncology inpatient follow-up      Reason for Visit:   bilateral lung masses     Referring Physician:  Keely Diop, APRN - CNP     PCP:  Eric Jackson MD    Subjective:  The patient is feeling better, the shortness of breath had significantly improved following the chest tube placement.    Past Medical History:      Diagnosis Date    Diarrhea     Hyperlipidemia     Hypertension     PONV (postoperative nausea and vomiting)     Urinary frequency      Patient Active Problem List   Diagnosis    Depression with suicidal ideation    Mixed bipolar II disorder (HCC)    Hyponatremia    Other fracture of left femur, initial encounter for closed fracture (HCC)    Alcohol dependence (HCC)    Lung mass    Hypertension    Hyperlipidemia        Past Surgical History:      Procedure Laterality Date    CARDIAC SURGERY      COLONOSCOPY      CORONARY ARTERY BYPASS GRAFT      CT NEEDLE BIOPSY LUNG PERCUTANEOUS  11/27/2024    CT NEEDLE BIOPSY LUNG PERCUTANEOUS 11/27/2024 [facility] CT    INGUINAL HERNIA REPAIR Bilateral 11/01/2013    [facility] - Laparoscopic Robotic Assisted, with Mesh    PACEMAKER PLACEMENT      and removed due to infection    SIGMOIDOSCOPY N/A 5/20/2024    SIGMOIDOSCOPY DIAGNOSTIC FLEXIBLE performed by Dayan Zuleta MD at [facility] ENDOSCOPY    UMBILICAL HERNIA REPAIR  11/01/2013    [facility] - Laparoscopic with Mesh    VENTRAL HERNIA REPAIR  11/01/2013    [facility] - Laparoscopic with Mesh       Family History:  History reviewed. No pertinent family history.    Medications:  Reviewed and reconciled.    Social History:  Social History     Socioeconomic History    Marital status:      Spouse name: Not on file    Number of children: Not on file    Years of education: Not on file    Highest education level: Not on file   Occupational History    Not on file   Tobacco Use    
[facility] [address] INTERMEDIATE 1  [address] [facility] [address]  Dept: [phone]  Loc: [phone]  Hematology/oncology inpatient follow-up      Reason for Visit:   bilateral lung masses     Referring Physician:  Keely Diop, APRN - CNP     PCP:  Eric Jackson MD    Subjective:  Patient in good spirits. No dyspnea, chest pain, or fever. Surgery placed on hold. No family present at bedside      Past Medical History:      Diagnosis Date    Diarrhea     Hyperlipidemia     Hypertension     PONV (postoperative nausea and vomiting)     Urinary frequency      Patient Active Problem List   Diagnosis    Depression with suicidal ideation    Mixed bipolar II disorder (HCC)    Hyponatremia    Other fracture of left femur, initial encounter for closed fracture (HCC)    Alcohol dependence (HCC)    Lung mass    Hypertension    Hyperlipidemia        Past Surgical History:      Procedure Laterality Date    CARDIAC SURGERY      COLONOSCOPY      CORONARY ARTERY BYPASS GRAFT      CT NEEDLE BIOPSY LUNG PERCUTANEOUS  11/27/2024    CT NEEDLE BIOPSY LUNG PERCUTANEOUS 11/27/2024 [facility] CT    INGUINAL HERNIA REPAIR Bilateral 11/01/2013    [facility] - Laparoscopic Robotic Assisted, with Mesh    PACEMAKER PLACEMENT      and removed due to infection    SIGMOIDOSCOPY N/A 5/20/2024    SIGMOIDOSCOPY DIAGNOSTIC FLEXIBLE performed by Dayan Zuleta MD at [facility] ENDOSCOPY    UMBILICAL HERNIA REPAIR  11/01/2013    [facility] - Laparoscopic with Mesh    VENTRAL HERNIA REPAIR  11/01/2013    [facility] - Laparoscopic with Mesh       Family History:  No family history on file.    Medications:  Reviewed and reconciled.    Social History:  Social History     Socioeconomic History    Marital status:      Spouse name: Not on file    Number of children: Not on file    Years of education: Not on file    Highest education level: Not on file   Occupational History    Not on file   Tobacco Use    Smoking status: Former     
[facility] INTERMEDIATE 1  [address] [facility] [address]  Dept: [phone]  Loc: [phone]  Hematology/oncology inpatient follow-up      Reason for Visit:   bilateral lung masses     Referring Physician:  Keely Diop, APRN - CNP     PCP:  Eric Jackson MD    Subjective:  The patient is status post left hemiarthroplasty, he has shortness of breath, plan is to have a chest tube placed.  Pain controlled.    Past Medical History:      Diagnosis Date    Diarrhea     Hyperlipidemia     Hypertension     PONV (postoperative nausea and vomiting)     Urinary frequency      Patient Active Problem List   Diagnosis    Depression with suicidal ideation    Mixed bipolar II disorder (HCC)    Hyponatremia    Other fracture of left femur, initial encounter for closed fracture (HCC)    Alcohol dependence (HCC)    Lung mass    Hypertension    Hyperlipidemia        Past Surgical History:      Procedure Laterality Date    CARDIAC SURGERY      COLONOSCOPY      CORONARY ARTERY BYPASS GRAFT      CT NEEDLE BIOPSY LUNG PERCUTANEOUS  11/27/2024    CT NEEDLE BIOPSY LUNG PERCUTANEOUS 11/27/2024 [facility] CT    INGUINAL HERNIA REPAIR Bilateral 11/01/2013    [facility] - Laparoscopic Robotic Assisted, with Mesh    PACEMAKER PLACEMENT      and removed due to infection    SIGMOIDOSCOPY N/A 5/20/2024    SIGMOIDOSCOPY DIAGNOSTIC FLEXIBLE performed by Dayan Zuleta MD at [facility] ENDOSCOPY    UMBILICAL HERNIA REPAIR  11/01/2013    [facility] - Laparoscopic with Mesh    VENTRAL HERNIA REPAIR  11/01/2013    [facility] - Laparoscopic with Mesh       Family History:  History reviewed. No pertinent family history.    Medications:  Reviewed and reconciled.    Social History:  Social History     Socioeconomic History    Marital status:      Spouse name: Not on file    Number of children: Not on file    Years of education: Not on file    Highest education level: Not on file   Occupational History    Not on file 
[facility] 4S INTERMEDIATE 1  [address] [facility] [address]  Dept: [phone]  Loc: [phone]  Hematology/oncology inpatient follow-up      Reason for Visit:   bilateral lung masses     Referring Physician:  Keely Diop, APRN - CNP     PCP:  Eric Jackson MD    Subjective:  Pt feels largely comfortable. But continues to have some left side/torso discomfort.  Denies of new issues. Pt noted chest tube was removed this morning.    Past Medical History:      Diagnosis Date    Diarrhea     Hyperlipidemia     Hypertension     PONV (postoperative nausea and vomiting)     Urinary frequency      Patient Active Problem List   Diagnosis    Depression with suicidal ideation    Mixed bipolar II disorder (HCC)    Hyponatremia    Other fracture of left femur, initial encounter for closed fracture (HCC)    Alcohol dependence (HCC)    Lung mass    Hypertension    Hyperlipidemia        Past Surgical History:      Procedure Laterality Date    CARDIAC SURGERY      COLONOSCOPY      CORONARY ARTERY BYPASS GRAFT      CT NEEDLE BIOPSY LUNG PERCUTANEOUS  11/27/2024    CT NEEDLE BIOPSY LUNG PERCUTANEOUS 11/27/2024 [facility] CT    HIP SURGERY Left 11/30/2024    LEFT HIP HEMIARTHROPLASTY performed by César Miller DO at [facility] OR    INGUINAL HERNIA REPAIR Bilateral 11/01/2013    [facility] - Laparoscopic Robotic Assisted, with Mesh    PACEMAKER PLACEMENT      and removed due to infection    SIGMOIDOSCOPY N/A 5/20/2024    SIGMOIDOSCOPY DIAGNOSTIC FLEXIBLE performed by Dayan Zuleta MD at [facility] ENDOSCOPY    UMBILICAL HERNIA REPAIR  11/01/2013    [facility] - Laparoscopic with Mesh    VENTRAL HERNIA REPAIR  11/01/2013    [facility] - Laparoscopic with Mesh       Family History:  History reviewed. No pertinent family history.    Medications:  Reviewed and reconciled.    Social History:  Social History     Socioeconomic History    Marital status:      Spouse name: Not on file    Number of children: Not 
Message placed to Dr. Casas regarding if he still wants patient to have Q6 sodium labs. Awaiting response back.   
Message sent to Dr Bates to check discharge to [facility] today  awaiting response   
Message sent to Dr. Casas in regards to 3%NS and NA lab draws.   
Message sent to Dr. Casas regarding patients sodium dropping to 127. New orders received   
Message sent to Dr. Vera regarding patients sodium level increasing by 4 and elevated potassium of 5.3. No new orders at this time.   
Messaged Dr Vera regarding pts fluid restriction of 1500ml. Rather to continue w restriction after dc to [facility] or not   
[facility] signup information texted to [phone] per patient request.    Electronically signed by Marilee Moya RN on 11/27/2024 at 7:51 AM    
Notified Julieth Lopez NP about patient having 8 beats of V tach.   
Nurse to nurse given to nurse manager at [facility]   
Occupational Therapy  OCCUPATIONAL THERAPY INITIAL EVALUATION  [facility]  [address] [place], [place]    Date: 2024     Patient Name: Mike Morrell Jr.  MRN: [id number]  : 1950  Room: [address] [place] [address]    Evaluating OT: Avis Bermeo, OTR/L - [id number]    Referring Provider: Abilio Serra MD  Specific Provider Orders/Date: \"OT eval and treat\" - 2024    Diagnosis: Hyponatremia [E87.1]  Other closed fracture of head or neck of left femur, initial encounter ([facility]) [S72.709T]     Patient underwent CT-guided lung biopsy on 2024.     Surgery: Patient underwent L hip hemiarthroplasty on 2024. Patient also underwent L chest tube placement on 2024.    Pertinent Medical History: HTN, urinary frequency     Precautions: fall risk, WBAT, hip precautions (until further clarified by ortho), L chest tube (clamped), [place]ol[place]  Additional Precautions: seizure precautions    Assessment of Current Deficits:    [x] Functional mobility   [x] ADLs  [x] Strength               [] Cognition   [x] Functional transfers   [x] IADLs         [x] Safety Awareness   [x] Endurance   [] Fine Motor Coordination  [x] Balance      [] Vision/Perception   [x] Sensation    [] Gross Motor Coordination [] ROM          [] Delirium                  [] Motor Control     OT PLAN OF CARE   OT POC is based on physician orders, patient diagnosis, and results of clinical assessment.  Frequency/Duration 2-5 days/week for 2-4 weeks PRN   Specific OT Treatment Interventions to Include:   * Instruction/training on adapted ADL techniques and AE recommendations to increase functional independence within precautions       * Training on energy conservation strategies, correct breathing pattern and techniques to improve independence/tolerance for self-care routine  * Functional transfer/mobility training/DME recommendations for increased independence, safety, and fall prevention  * 
Occupational Therapy  OT BEDSIDE TREATMENT NOTE      Date:2024  Patient Name: Mike Morrell Jr.  MRN: [id number]  : 1950  Room: [address] [place] [address]       Evaluating OT: Avis Bermeo, OTR/L - [id number]     Referring Provider: Abilio Serra MD  Specific Provider Orders/Date: \"OT eval and treat\" - 2024     Diagnosis: Hyponatremia [E87.1]  Other closed fracture of head or neck of left femur, initial encounter ([facility]) [S72.662A]      Patient underwent CT-guided lung biopsy on 2024.      Surgery: Patient underwent L hip hemiarthroplasty on 2024. Patient also underwent L chest tube placement on 2024.     Pertinent Medical History: HTN, urinary frequency      Precautions: fall risk, WBAT, hip precautions (until further clarified by ortho),  [place]  Additional Precautions: seizure precautions     Assessment of Current Deficits:    [x] Functional mobility             [x] ADLs          [x] Strength                  [] Cognition   [x] Functional transfers           [x] IADLs         [x] Safety Awareness   [x] Endurance   [] Fine Motor Coordination    [x] Balance      [] Vision/Perception   [x] Sensation    [] Gross Motor Coordination [] ROM          [] Delirium                  [] Motor Control      OT PLAN OF CARE   OT POC is based on physician orders, patient diagnosis, and results of clinical assessment.  Frequency/Duration 2-5 days/week for 2-4 weeks PRN   Specific OT Treatment Interventions to Include:   * Instruction/training on adapted ADL techniques and AE recommendations to increase functional independence within precautions       * Training on energy conservation strategies, correct breathing pattern and techniques to improve independence/tolerance for self-care routine  * Functional transfer/mobility training/DME recommendations for increased independence, safety, and fall prevention  * Patient/Family education to increase follow through with safety techniques and functional independence  * 
Occupational Therapy  Pt laying in the bed eating lunch from plate on his lap.  Declined to get back OOB at this time.  Wants to finish his meal.  Declined to have it on his tray table.  Will attempt another time.   Neil HOLDER [id number]  
On for surgery with Randa for tomorrow - left hip patricia  
Patient not cleared for surgery today.  Needs medical clearance and optimized for hemiarthroplasty.  Plan for surgery tentatively on 11/29/2024.  N.p.o. after midnight tomorrow  
Patient remains hyponatremic and is not cleared for surgery per anesthesia.  Keep n.p.o. after midnight and continue to correct, hoping for hemiarthroplasty of affected left hip tomorrow again pending clearance.  
Patient seen and evaluated this morning.  He is alert and oriented with no apparent distress.  His sodium is now above 130 and he has been cleared for surgery.  I introduced myself to the family and the patient and explained hemiarthroplasty of the left hip including all risk/benefits alternatives to treatment.  Patient and family in agreement.  Formal operative note to follow.  Please see the formal consultation note from Dr. Stauffer  
Patient was able to stand with an assist of 2 and a walker. He stood for about 2 minutes for two times. Patient is now resting comfortable in bed.   
Perfect Serve to Dr. Vera that patient Na level at 9pm after 3% saline infusion came back at 127.  Electronically signed by Regina Dangelo RN on 11/29/2024 at 9:34 PM    New orders placed  Electronically signed by Regina Dangelo RN on 11/29/2024 at 10:02 PM    
Perfect serve to Dr. Casas to update him on 3:30pm sodium level of 125 and that previous sodium level at 9am was 127. Family concerned that surgery scheduled for tomorrow will be canceled due to Na less than 130. New orders placed.    Electronically signed by Regina Dangelo RN on 11/29/2024 at 4:48 PM      
Pt transferred to chair in room with assist x 2, pt tolerates well, sitting up in chair watching TV  
RODERICK Clancy paged regarding patient request for pain medication but BP too low to administer IV morphine. New order received.  
Reached out to Dr Montes's answering service regarding if pt is ok to dc from cardiologys stand point   
Received perfect serve regarding patient labs  Hyponatremia. Volume status can not determine at this point.  Serum and urine osmolality almost alike. So SIADH unlikely, will check uric acid level. But lung mass raises suspicion of SIADH. Also history of ETOH abuse.  Discussed with nurse, patient is asymptomatic.  So cancel 3% saline.  Also I will put new orders for Na check with new parameters.    
Report called to floor RN  
Spiritual Health History and Assessment/Progress Note  [facility]     Encounter, Rituals, Rites and Sacraments,  ,  ,      Name: Mike Morrell Jr. MRN: [id number]    Age: 74 y.o.     Sex: male   Language: English   Congregational: [employer]az[employer]   Hyponatremia     Date: 12/3/2024                           Spiritual Assessment began in [facility] 4S INTERMEDIATE 1        Referral/Consult From: Rounding   Encounter Overview/Reason:  Encounter, Rituals, Rites and Sacraments  Service Provided For: Patient    Evie, Belief, Meaning:   Patient is connected with a evie tradition or spiritual practice  Family/Friends are connected with a evie tradition or spiritual practice      Importance and Influence:  Patient has no beliefs influential to healthcare decision-making identified during this visit  Family/Friends have no beliefs influential to healthcare decision-making identified during this visit    Community:  Patient feels well-supported. Support system includes: Other: Step-daughter  Family/Friends feel well-supported. Support system includes: Children    Assessment and Plan of Care:     Patient Interventions include: Affirmed coping skills/support systems and Provided sacramental/Spiritism ritual  Family/Friends Interventions include: Affirmed coping skills/support systems    Patient Plan of Care: Spiritual Care available upon further referral  Family/Friends Plan of Care: Spiritual Care available upon further referral    Electronically signed by Chaplain Kennedy on 12/3/2024 at 6:29 PM   
Spoke with Dr. Casas regarding sodium result post 3% NaCl infusion. New order received.  
Spoke with Dr. Serra on floor in regards to patient MRI brain results. Dr. Serra wants Shahrzad Dodd NP made aware. Shahrzad messaged via perfect serve of the above.   
17 g  17 g Oral Daily César Miller DO   17 g at 12/01/24 0856    enoxaparin (LOVENOX) injection 40 mg  40 mg SubCUTAneous Daily César Miller DO   40 mg at 12/01/24 0856    lidocaine 1 % injection 5 mL  5 mL IntraDERmal Once Abilio Dukes DO        vitamin B-12 (CYANOCOBALAMIN) tablet 1,000 mcg  1,000 mcg Oral Daily Ani Andrews MD   1,000 mcg at 12/01/24 0853    albuterol (PROVENTIL) (2.5 MG/3ML) 0.083% nebulizer solution 2.5 mg  2.5 mg Nebulization Q6H PRN Abilio Dukes DO        ferrous sulfate (IRON 325) tablet 325 mg  325 mg Oral Daily with breakfast Ani Andrews MD   325 mg at 12/01/24 0853    folic acid (FOLVITE) tablet 1 mg  1 mg Oral Daily Gus Uriarte DO   1 mg at 12/01/24 0854    melatonin tablet 3 mg  3 mg Oral Nightly PRN Gus Uriarte DO   3 mg at 11/28/24 2201    thiamine tablet 100 mg  100 mg Oral Daily Yeyo Mcgovern DO   100 mg at 12/01/24 0854    LORazepam (ATIVAN) tablet 1 mg  1 mg Oral Q1H PRN Yeyo Mcgovern DO   1 mg at 11/28/24 0222    Or    LORazepam (ATIVAN) tablet 2 mg  2 mg Oral Q1H PRN Yeyo Mcgovern DO        Or    LORazepam (ATIVAN) tablet 3 mg  3 mg Oral Q1H PRN Yeyo Mcgovern DO        Or    LORazepam (ATIVAN) tablet 4 mg  4 mg Oral Q1H PRN Yeyo Mcgovern DO        tamsulosin (FLOMAX) capsule 0.4 mg  0.4 mg Oral Daily Abilio Serra MD   0.4 mg at 12/01/24 0854    nitroGLYCERIN (NITROSTAT) SL tablet 0.4 mg  0.4 mg SubLINGual Q5 Min PRN Abilio Serra MD        amLODIPine (NORVASC) tablet 5 mg  5 mg Oral Daily Abilio Serra MD   5 mg at 12/01/24 0854    [Held by provider] lisinopril (PRINIVIL;ZESTRIL) tablet 20 mg  20 mg Oral Daily Abilio Serra MD        diazePAM (VALIUM) tablet 2 mg  2 mg Oral Q12H PRN Abilio Serra MD   2 mg at 11/29/24 0858    tiZANidine (ZANAFLEX) tablet 2 mg  2 mg Oral TID Abilio Serra MD   2 mg at 12/01/24 0853    potassium chloride (KLOR-CON M) extended release tablet 40 mEq  40 mEq Oral PRN Abilio Serra MD        Or    
César RIBEIRO DO   2 mg at 12/02/24 0019    Or    morphine sulfate (PF) injection 4 mg  4 mg IntraVENous Q2H PRN César Miller DO   4 mg at 12/03/24 0423    polyethylene glycol (GLYCOLAX) packet 17 g  17 g Oral Daily César Miller DO   17 g at 12/02/24 0816    enoxaparin (LOVENOX) injection 40 mg  40 mg SubCUTAneous Daily César Miller DO   40 mg at 12/02/24 0817    lidocaine 1 % injection 5 mL  5 mL IntraDERmal Once Abilio Dukes DO        vitamin B-12 (CYANOCOBALAMIN) tablet 1,000 mcg  1,000 mcg Oral Daily Ani Andrews MD   1,000 mcg at 12/02/24 0816    albuterol (PROVENTIL) (2.5 MG/3ML) 0.083% nebulizer solution 2.5 mg  2.5 mg Nebulization Q6H PRN Abilio Dukes DO        ferrous sulfate (IRON 325) tablet 325 mg  325 mg Oral Daily with breakfast Ani Andrews MD   325 mg at 12/02/24 0816    folic acid (FOLVITE) tablet 1 mg  1 mg Oral Daily Gus Uriarte, DO   1 mg at 12/02/24 0816    melatonin tablet 3 mg  3 mg Oral Nightly PRN Gus Uriarte DO   3 mg at 12/02/24 2003    thiamine tablet 100 mg  100 mg Oral Daily Yeyo Mcgovern DO   100 mg at 12/02/24 0817    LORazepam (ATIVAN) tablet 1 mg  1 mg Oral Q1H PRN Yeyo Mcgovern DO   1 mg at 12/01/24 1420    Or    LORazepam (ATIVAN) tablet 2 mg  2 mg Oral Q1H PRN Yeyo Mcgovern DO        Or    LORazepam (ATIVAN) tablet 3 mg  3 mg Oral Q1H PRN Yeyo Mcgovern DO        Or    LORazepam (ATIVAN) tablet 4 mg  4 mg Oral Q1H PRN Yeyo Mcgovern DO        tamsulosin (FLOMAX) capsule 0.4 mg  0.4 mg Oral Daily Abilio Serra MD   0.4 mg at 12/02/24 0816    nitroGLYCERIN (NITROSTAT) SL tablet 0.4 mg  0.4 mg SubLINGual Q5 Min PRN Abilio Serra MD        amLODIPine (NORVASC) tablet 5 mg  5 mg Oral Daily Abilio Serra MD   5 mg at 12/02/24 0816    [Held by provider] lisinopril (PRINIVIL;ZESTRIL) tablet 20 mg  20 mg Oral Daily Abilio Serra MD        tiZANidine (ZANAFLEX) tablet 2 mg  2 mg Oral TID Abilio Serra MD   2 mg at 12/02/24 2001    potassium 
NT  Min assist    Ambulation    5 feet with ww with mod assist of 2  50 feet with ww with min assist   Stair Negotiation  Ascended and descended  NT   4 steps with 2 rail with min assist   LE strength     R LE 3+/5  L LE 3-/5    4-/5   balance      SB sitting  Mod standing     AM-PAC Raw score               11/24         Pt is alert and Oriented   LE ROM: WFL of precuation  Sensation: intact  Endurance: fair  Chair alarm: yes     ASSESSMENT:    Pt displays functional ability as noted in the objective portion of this evaluation.      Patient education  Pt educated on hip precatuions    Patient response to education:   Pt verbalized understanding Pt demonstrated skill Pt requires further education in this area   yes yes reinforcement       Comments:  Pt motivated for PT session. Hip precautions reviewed with pt. Increased time to complete mobility. No c/o dizziness during session. Difficulty moving LE's to take a few steps forward, slides/shuffles to advance LE\"s. Fatigued with mobility. Up in chair at end of session with call light in reach and chair alarm on.       Conditions Requiring Skilled Therapeutic Intervention:    [x]Decreased strength     []Decreased ROM  [x]Decreased functional mobility  [x]Decreased balance   [x]Decreased endurance   []Decreased posture  []Decreased sensation  []Decreased coordination   []Decreased vision  []Decreased safety awareness   [x]Increased pain       Patient and or family understand(s) diagnosis, prognosis, and plan of care.    Prognosis is good for reaching above PT goals    PHYSICAL THERAPY PLAN OF CARE:    PT POC is established based on physician order and patient diagnosis     Referring provider/PT Order: Abilio Serra MD / PT eval and treat      Current Treatment Recommendations:     [x] Strengthening to improve independence with functional mobility   [] ROM to improve independence with functional mobility   [x] Balance Training to improve static/dynamic balance and to 
S2  Gastrointestinal:  Soft, flat, non-distended, non-tender  Neurologic:  Nonfocal  Skin:  warm, dry, no rashes, no lesions    DATA:    CBC:   Lab Results   Component Value Date/Time    WBC 7.4 12/01/2024 03:04 AM    RBC 2.21 12/01/2024 03:04 AM    HGB 8.3 12/01/2024 03:04 AM    HCT 24.8 12/01/2024 03:04 AM    .2 12/01/2024 03:04 AM    MCH 37.6 12/01/2024 03:04 AM    MCHC 33.5 12/01/2024 03:04 AM    RDW 12.4 12/01/2024 03:04 AM     12/01/2024 03:04 AM    MPV 9.4 12/01/2024 03:04 AM     CMP:    Lab Results   Component Value Date/Time     12/03/2024 06:22 AM    K 4.4 12/03/2024 06:22 AM    CL 96 12/03/2024 06:22 AM    CO2 23 12/03/2024 06:22 AM    BUN 16 12/03/2024 06:22 AM    CREATININE 0.9 12/03/2024 06:22 AM    GFRAA 48 04/25/2021 02:31 AM    LABGLOM 90 12/03/2024 06:22 AM    GLUCOSE 102 12/03/2024 06:22 AM    CALCIUM 8.2 12/03/2024 06:22 AM    BILITOT 0.4 12/03/2024 06:22 AM    ALKPHOS 59 12/03/2024 06:22 AM    AST 25 12/03/2024 06:22 AM    ALT 6 12/03/2024 06:22 AM     Magnesium:  No results found for: \"MG\"  Phosphorus:    Lab Results   Component Value Date/Time    PHOS 3.6 11/27/2024 07:30 AM     Radiology Review:    XR CHEST PORTABLE 11/26/24    IMPRESSION:  Masslike density along the left lateral margin of the heart 6.4 cm focus  somewhat indeterminate for vascular are cardiac origin versus adjacent mass  in the left mid lung with dedicated CT chest recommended for further  evaluation.     CT CHEST W CONTRAST 11/26/24    IMPRESSION  1. 5.1 cm solid spiculated mass in the lingular segment left upper lobe with  adjacent subcentimeter satellite nodule.  Findings consistent with  bronchogenic carcinoma.  There is an enlarged left hilar lymph node likely  metastatic.  2. 1.2 cm spiculated nodule in the right upper lobe suspicious for 2nd,  primary neoplasm.  3. Healing non comminuted nondisplaced fractures of the anterior left 3rd and  4th ribs.    BRIEF SUMMARY OF INITIAL CONSULT:    Briefly 
acetaminophen      Physical Exam:  Physical Exam  Constitutional:       General: He is not in acute distress.  HENT:      Head: Normocephalic and atraumatic.      Mouth/Throat:      Pharynx: No oropharyngeal exudate.   Eyes:      General: No scleral icterus.     Conjunctiva/sclera: Conjunctivae normal.   Neck:      Trachea: No tracheal deviation.   Cardiovascular:      Rate and Rhythm: Normal rate.      Heart sounds: Normal heart sounds.   Pulmonary:      Effort: Pulmonary effort is normal.      Breath sounds: Decreased breath sounds present.      Comments: +left chest crepitus  Abdominal:      Palpations: Abdomen is soft.      Tenderness: There is no abdominal tenderness.   Musculoskeletal:         General: No swelling or deformity.      Cervical back: Neck supple.   Lymphadenopathy:      Cervical: No cervical adenopathy.   Skin:     General: Skin is warm.      Findings: No rash.   Neurological:      General: No focal deficit present.      Mental Status: He is alert and oriented to person, place, and time.   Psychiatric:         Behavior: Behavior normal.         Pertinent/ New Labs and Imaging Studies     Pulmonary Function Testing personally reviewed and interpreted.    PERTINENT LAB RESULTS: Labs reviewed.      DIAGNOSTICS: Pertinent imaging reviewed.         Assessment:      Left upper lobe lung mass, 5.1 cm s/p biopsy 11/27  Postop pneumothorax with subcutaneous air -s/p left chest tube 11/30  Right upper lobe spiculated nodule, 1.2 cm  Former nicotine dependence  Alcohol abuse  Left hip fracture  Hyponatremia       Plan:     Continue chest tube to suction  Follow chest imaging  Await biopsy results, oncology following  Ortho following for hip fracture  GI/DVT      Thank you for allowing me to participate in the care of Mike Morrell Jr..   Please feel free to call with questions.     Electronically signed by Abilio Dukes DO on 11/30/2024 at 5:52 PM  
file    Number of children: Not on file    Years of education: Not on file    Highest education level: Not on file   Occupational History    Not on file   Tobacco Use    Smoking status: Former     Current packs/day: 0.25     Types: Cigarettes    Smokeless tobacco: Never    Tobacco comments:     Quit 12/2016   Vaping Use    Vaping status: Never Used   Substance and Sexual Activity    Alcohol use: Not Currently     Alcohol/week: 12.0 standard drinks of alcohol     Types: 12 Cans of beer per week    Drug use: No    Sexual activity: Not on file   Other Topics Concern    Not on file   Social History Narrative    Not on file     Social Determinants of Health     Financial Resource Strain: Not on file   Food Insecurity: No Food Insecurity (11/26/2024)    Hunger Vital Sign     Worried About Running Out of Food in the Last Year: Never true     Ran Out of Food in the Last Year: Never true   Transportation Needs: No Transportation Needs (11/26/2024)    PRAPARE - Transportation     Lack of Transportation (Medical): No     Lack of Transportation (Non-Medical): No   Physical Activity: Not on file   Stress: Not on file   Social Connections: Not on file   Intimate Partner Violence: Not on file   Housing Stability: Low Risk  (11/26/2024)    Housing Stability Vital Sign     Unable to Pay for Housing in the Last Year: No     Number of Times Moved in the Last Year: 0     Homeless in the Last Year: No       Allergies:  Allergies   Allergen Reactions    Sulfa Antibiotics Hives       Physical Exam:  BP (!) 140/79   Pulse 98   Temp 98.2 °F (36.8 °C) (Axillary)   Resp 16   Ht 1.727 m (5' 8\")   Wt 59 kg (130 lb)   SpO2 99%   BMI 19.77 kg/m²   GENERAL: Alert, oriented x 3, not in acute distress.  HEENT: PERRLA; EOMI. Oropharynx clear.   NECK: Supple. No palpable cervical or supraclavicular lymphadenopathy.   LUNGS: Are clear; no wheezing, rhonchi or crackles.  CARDIOVASCULAR: RRR without murmurs, rubs or gallops.   ABDOMEN: Soft. 
in my office in 2 weeks.  Please call with any additional questions or concerns    César Miller, DO  12/2/2024  9:10 PM    
stand: mod assist of 2  Stand to sit: mod assist of 2  Stand pivot: NT Sit <> stand; Mod A  Stand pivot: Mod A Min assist    Ambulation    5 feet with ww with mod assist of 2 Few steps with WW Mod A 50 feet with ww with min assist   Stair Negotiation  Ascended and descended  NT NT  4 steps with 2 rail with min assist   LE strength     R LE 3+/5  L LE 3-/5    4-/5   balance      SB sitting  Mod standing     AM-PAC Raw score               11/24 11/24      Pt is alert, following instruction  Balance: poor during brief mobility with WW    Pt performed therapeutic exercise of the following: seated B ankle pumps, glut sets AROM; L LE LAQ's AAROM x 20    Patient education/treatment  Pt was educated on therapeutic exercise for circulation/strengthening, UE usage transfer safety, gait mechanics for LE advancement, hip precautions    Patient response to education:   Pt verbalized understanding Pt demonstrated skill Pt requires further education in this area   yes With instruction yes     ASSESSMENT:   Comments: Nurse ok with Rx. Pt found in bed, assisted to EOB, sat EOB SBA. Brief mobility with WW slow and laboring. Pt unsteady, strong hands on support for balance/safety/fall prevention needed. Pt able to take small shuffle steps B LE's. Pt fatigued after activity, rested briefly prior to LE exercise. Exercise written on the white board.   Pt was left in a bedside chair with call light in reach, ABd pillow placed between LE's for positioning promoting hip precautions    Chair/bed alarm: chair active    Time in 0828   Time out 0858   Total Treatment Time 30 minutes   CPT codes:     Therapeutic activities 66374 20 minutes   Therapeutic exercises 77745 10 minutes       Pt is making consistent progress toward established Physical Therapy goals.  Continue with physical therapy current plan of care.    Pedro Luis Wilson PTA   License Number: [id number]                            
to EOB Min assist   Transfers Sit to stand: mod assist of 2  Stand to sit: mod assist of 2  Stand pivot: NT Sit <> stand; Mod A  Stand pivot: NT Min assist    Ambulation    5 feet with ww with mod assist of 2 20 feet with WW Mod A, WBAT L LE.  50 feet with ww with min assist   Stair Negotiation  Ascended and descended  NT NT  4 steps with 2 rail with min assist   LE strength     R LE 3+/5  L LE 3-/5    4-/5   balance      SB sitting  Mod standing     AM-PAC Raw score               11/24 11/24      Pt is alert, following instruction  Balance: poor during gait with WW    Pt performed therapeutic exercise of the following: supine B ankle pumps, quad/glut sets AROM; L LE heel slides, hip ABd and SAQ's AAROM x 20    Patient education/treatment  Pt was educated on therapeutic exercise for circulation/strengthening, UE usage transfer safety, gait mechanics for sequence/posture/L LE hip alignment, hip precautions    Patient response to education:   Pt verbalized understanding Pt demonstrated skill Pt requires further education in this area   yes With instruction yes     ASSESSMENT:   Comments: Nurse ok with Rx. Pt found in bed, exercise performed. Pt assisted to EOB, sat EOB SBA. Gait with WW slow and laboring. Pt remains unsteady, strong hands on support for balance/safety/fall prevention needed. Pt displayed increased step length B LE's compared to yesterday, displayed poor L LE alignment due to hip ER. Pt able to improve this with instruction but unable to maintain neutral alignment. Pt fatigued after activity.   Pt was left in a bedside chair with call light in reach, ABd pillow placed between LE's for positioning promoting hip precautions    Chair/bed alarm: chair active    Time in 0820  Time out 0855  Total Treatment Time 35 minutes   CPT codes:     Therapeutic activities 46805 20 minutes   Therapeutic exercises 52951 15 minutes       Pt is making fair progress toward established Physical Therapy goals, able to 
  ABDOMEN: Soft. Non-tender, non-distended. Positive bowel sounds.  EXTREMITIES: Without clubbing, cyanosis, or edema.  Positive for left lower extremity deformity  NEUROLOGIC: No focal deficits.   ECOG PS 1         Impression/Plan:      Mr. Morrell is a 74-year-old gentleman, with a past medical history significant for hyperlipidemia, and hypertension, he is a former smoker, who had presented to the ED on 11/26/2024 with left hip pain status post mechanical fall, hip x-ray had revealed acute fracture involving the left femoral neck subcapital region with foreshortening and mild displacement.  Chest x-ray had revealed masslike density along the left lateral margin of the heart, 6.4 cm focus somewhat indeterminate for vascular or cardiac origin versus adjacent mass in the left midlung, he subsequently had a CT scan of the chest done revealing a 5.1 cm solid spiculated mass in the lingular segment of the left upper lobe with adjacent subcentimeter satellite nodule, 1.2 cm spiculated nodule in the right upper lobe suspicious for a second primary neoplasm.  Healing on community nondisplaced fractures of the anterior left third and fourth rib.  The patient underwent on 11/27/2024 biopsy of the left inguinal mass.  Upon presentation blood work was remarkable for a sodium level of 117, calcium 8.8.      CT-guided left lung biopsy completed on 11/27/2024    Postprocedural CT chest noted small pneumothorax, serial Chest x-ray later revealed progressive enlargement of the left pneumothorax, no large volume with essentially totally atelectatic left lung, no rightward mediastinal shift, status post chest tube placement, doing better clinically, chest x-ray reviewed today, tiny left apical pneumothorax.      - The patient has a left lung mass, measuring 5.1 cm, concerning for lung malignancy, also 1.2 cm spiculated nodule in the right upper lobe, question metastatic versus second primary.  -Staging workup completed: Bone scan 
LORazepam **OR** [DISCONTINUED] LORazepam **OR** LORazepam **OR** [DISCONTINUED] LORazepam **OR** LORazepam **OR** [DISCONTINUED] LORazepam **OR** LORazepam **OR** [DISCONTINUED] LORazepam, nitroGLYCERIN, HYDROcodone-acetaminophen, diazePAM, sodium chloride flush, sodium chloride, potassium chloride **OR** potassium alternative oral replacement **OR** [DISCONTINUED] potassium chloride, magnesium sulfate, ondansetron **OR** ondansetron, polyethylene glycol, acetaminophen **OR** acetaminophen, morphine    Objective  Most Recent Recorded Vitals  BP (!) 147/61   Pulse 80   Temp 97.5 °F (36.4 °C) (Oral)   Resp 16   Ht 1.727 m (5' 8\")   Wt 61.2 kg (135 lb)   SpO2 98%   BMI 20.53 kg/m²   I/O last 3 completed shifts:  In: 105.2 [P.O.:30; IV Piggyback:75.2]  Out: 1550 [Urine:1550]  I/O this shift:  In: -   Out: 500 [Urine:500]    Physical Exam:  General: AAO to person/place/time/purpose, NAD, no labored breathing, hard of hearing  Eyes: conjunctivae/corneas clear, sclera non icteric  Skin: color/texture/turgor normal, no rashes or lesions  Lungs: CTAB, no retractions/use of accessory muscles, no vocal fremitus, no rhonchi, no crackle, no rales  Heart: regular rate, regular rhythm, no murmur  Abdomen: soft, NT, bowel sounds normal  Extremities: Externally rotated and contracted left lower extremity, no edema  Neurologic: cranial nerves 2-12 grossly intact, no slurred speech    Most Recent Labs  Lab Results   Component Value Date    WBC 6.5 11/28/2024    HGB 9.6 (L) 11/28/2024    HCT 27.7 (L) 11/28/2024     11/28/2024     (L) 11/28/2024    K 5.2 (H) 11/28/2024    CL 90 (L) 11/28/2024    CREATININE 0.9 11/28/2024    BUN 15 11/28/2024    CO2 23 11/28/2024    GLUCOSE 116 (H) 11/28/2024    ALT 14 11/28/2024    AST 25 11/28/2024    INR 0.9 11/27/2024    TSH 2.22 11/28/2024       XR CHEST INSPIRATION AND EXPIRATION   Final Result   Small left pneumothorax, estimated at less than 5% similar compared to the   prior 
and/or  pneumonia.  3. Stable left hilar prominence concerning for underlying mass.      CXR 11/30/2024:  FINDINGS:  Progressive enlargement of left pneumothorax now large volume with  essentially totally atelectatic left lung.  Cardiac size unchanged.  No  rightward mediastinal shift despite volume of the left pneumothorax.  Left  chest wall emphysema again noted     IMPRESSION:  Progressive enlargement of left pneumothorax now large volume with  essentially totally atelectatic left lung. No rightward mediastinal shift.      Chest CT 11/26/24:  FINDINGS  Thoracic aortic caliber is normal.  There is an enlarged anterior left hilar  lymph node measuring 2.1 cm, image 68, series 303.  There are coronary artery  calcifications.  There are no pleural or pericardial effusions.  The adrenal  glands are normal.     Lung window images:     Right lung:     Upper lobe emphysema.  There is a solid spiculated nodule in the  posteromedial left upper lobe measuring approximately 1.2 x 0.9 cm, image 47,  series 301.     Minor scarring in the medial segment of the right middle lobe.     Left lung:     Upper lobe emphysema.     Solid spiculated mass in the lingular segment measuring 4.6 x 5.1 x 4.1 cm,  image 76, series 301.  Along the anterior aspect of the mass there is a  satellite nodule measuring 6 mm, image 74.     Bones: There are no lytic or blastic osseous lesions.  There are healing non  comminuted nondisplaced fractures of the anterior left 3rd and 4th ribs.     IMPRESSION  1. 5.1 cm solid spiculated mass in the lingular segment left upper lobe with  adjacent subcentimeter satellite nodule.  Findings consistent with  bronchogenic carcinoma.  There is an enlarged left hilar lymph node likely  metastatic.  2. 1.2 cm spiculated nodule in the right upper lobe suspicious for 2nd,  primary neoplasm.  3. Healing non comminuted nondisplaced fractures of the anterior left 3rd and  4th ribs.          Labs:  Lab Results   Component 
catheter currently. Max A hiral hygiene.   Min A   Bed Mobility  Supine-to-Sit: Max A Max A supine to sit   Mod A in order to maximize patient's independence/participation with ADLs, re-positioning, and other functional tasks.   Functional Transfers Sit-to-Stand: Mod Ax2 from EOB. Cues needed to maximize safety and to facilitate proper hand placement. Mod A   Pt reports feeling weak and anxious with standing.  2 people present for safety.    CGA   Functional Mobility Mod Ax2 (with walker) and increased time needed to take a few steps forward from EOB; chair brought up behind patient. Limited tolerance of functional mobility due to pain and fatigue. Mod A pivot bed to chair using w/w for support.  Assist to manage walker during pivot. Assist to manage chest tube.   CGA with functional mobility (with device, as needed/appropriate) in order to maximize independence with ADLs/IADLs and other functional tasks.   Balance Sitting: Fair- to Fair (at EOB)  Standing: Poor+ to Fair- (with walker) with posterior lean noted Static standing for ADL min A   Dynamic standing mod A   Sit balance on the side of the bed min A   Fair+ dynamic standing balance during completion of ADLs/IADLs and other functional tasks.   Activity Tolerance Fair  Limited by pain and fatigue.  fair seated.   Poor standing.    O2 saturation 97% after transfers.  Patient will demonstrate Good understanding and consistent implementation of energy conservation techniques and work simplification techniques into ADL/IADL routines.   Visual/  Perceptual WFL grossly  Patient does not wear glasses.    N/A   B UE Strength, ROM B UE ROM: WFL  B UE Strength: 4-/5 grossly    Functional use of UE's noted.   Patient will demonstrate 5/5 B UE strength in order to maximize independence with ADLs/IADLs and functional transfers.     Comments:  Pt laying in the bed.  Agreeable to therapy.  Pt is fearful of standing/falling.  2 person assist for safety and management of 
extremity deformity  NEUROLOGIC: No focal deficits.   ECOG PS 1         Impression/Plan:      Mr. Morrell is a 74-year-old gentleman, with a past medical history significant for hyperlipidemia, and hypertension, he is a former smoker, who had presented to the ED on 11/26/2024 with left hip pain status post mechanical fall, hip x-ray had revealed acute fracture involving the left femoral neck subcapital region with foreshortening and mild displacement.  Chest x-ray had revealed masslike density along the left lateral margin of the heart, 6.4 cm focus somewhat indeterminate for vascular or cardiac origin versus adjacent mass in the left midlung, he subsequently had a CT scan of the chest done revealing a 5.1 cm solid spiculated mass in the lingular segment of the left upper lobe with adjacent subcentimeter satellite nodule, 1.2 cm spiculated nodule in the right upper lobe suspicious for a second primary neoplasm.  Healing on community nondisplaced fractures of the anterior left third and fourth rib.  The patient underwent on 11/27/2024 biopsy of the left inguinal mass.  Upon presentation blood work was remarkable for a sodium level of 117, calcium 8.8.    - The patient has a left lung mass, measuring 5.1 cm, concerning for lung malignancy, also 1.2 cm spiculated nodule in the right upper lobe, question metastatic versus second primary.  -Ordered staging workup, including CT scan of the abdomen the pelvis, bone scan and brain MRI.  -PET scan as outpatient.  -Await pathology results, possible biopsy of the right upper lobe mass.  -Hyponatremia, likely SIADH, nephrology team was consulted, appreciate input, sodium level 124 today.  -Mild macrocytic anemia, the macrocytosis is likely secondary to alcohol abuse, chronic inflammation and malignancy could also be causing the anemia.  Workup results reviewed, he has iron deficiency, will start iron supplement, vitamin B12 is borderline, vit B12 inj today.  Continue to monitor 
normal.  There is an enlarged anterior left hilar  lymph node measuring 2.1 cm, image 68, series 303.  There are coronary artery  calcifications.  There are no pleural or pericardial effusions.  The adrenal  glands are normal.     Lung window images:     Right lung:     Upper lobe emphysema.  There is a solid spiculated nodule in the  posteromedial left upper lobe measuring approximately 1.2 x 0.9 cm, image 47,  series 301.     Minor scarring in the medial segment of the right middle lobe.     Left lung:     Upper lobe emphysema.     Solid spiculated mass in the lingular segment measuring 4.6 x 5.1 x 4.1 cm,  image 76, series 301.  Along the anterior aspect of the mass there is a  satellite nodule measuring 6 mm, image 74.     Bones: There are no lytic or blastic osseous lesions.  There are healing non  comminuted nondisplaced fractures of the anterior left 3rd and 4th ribs.     IMPRESSION  1. 5.1 cm solid spiculated mass in the lingular segment left upper lobe with  adjacent subcentimeter satellite nodule.  Findings consistent with  bronchogenic carcinoma.  There is an enlarged left hilar lymph node likely  metastatic.  2. 1.2 cm spiculated nodule in the right upper lobe suspicious for 2nd,  primary neoplasm.  3. Healing non comminuted nondisplaced fractures of the anterior left 3rd and  4th ribs.          Labs:  Lab Results   Component Value Date/Time    WBC 7.4 12/01/2024 03:04 AM    RBC 2.21 12/01/2024 03:04 AM    HGB 8.3 12/01/2024 03:04 AM    HCT 24.8 12/01/2024 03:04 AM    .2 12/01/2024 03:04 AM    MCH 37.6 12/01/2024 03:04 AM    MCHC 33.5 12/01/2024 03:04 AM    RDW 12.4 12/01/2024 03:04 AM     12/01/2024 03:04 AM    MPV 9.4 12/01/2024 03:04 AM     Lab Results   Component Value Date/Time     12/03/2024 06:22 AM    K 4.4 12/03/2024 06:22 AM    CL 96 12/03/2024 06:22 AM    CO2 23 12/03/2024 06:22 AM    BUN 16 12/03/2024 06:22 AM    CREATININE 0.9 12/03/2024 06:22 AM    CALCIUM 8.2 12/03/2024 
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  Initial blood work in the ER showed a sodium level of 117 mEq/L, reason for this consultation.  Patient reports drinking large amount of beer throughout the day along with oral fluids including water.  He barely eats and mostly have a small amount of food at dinner but many times he skips dinner.  Patient denies any nausea or vomiting.  His medications prior to admission included lisinopril, Norco.      IMPRESSION/RECOMMENDATIONS:      Hypotonic hyponatremia, multifactorial including poor solute intake \"beer potomania\" r/o SIADH. Sodium improved to 132 today     Hyperkalemia, 2/2 ACE inhibition, potassium 4.8, continue to hold lisinopril  HTN, to hold lisinopril, continue amlodipine  Macrocytic anemia  -------------------------------------------  Left upper lobe lung mass, likely bronchogenic carcinoma, biopsy ordered  History of CAD status post CABG  Alcohol abuse  Left hip fracture status post fall, surgery pending     Plan:    Continue sodium chloride 1 g TID  Obtain urine indices tomorrow   Continue to monitor sodium level  Continue to monitor blood pressure   Fluid restriction, 1.5 L   Continue to hold lisinopril  Continue amlodipine 5 mg PO daily     Wendy Hatch, ALLAN - CNP    I saw and evaluated the patient, performing the key elements of the service. I discussed the findings, assessment and plan 
syndrome status post dual-chamber pacemaker placement in 2006 for symptomatic bradycardia status post extraction due to infection, hyperlipidemia, severe diverticulosis, who was admitted on 11/26/2024 after he presented to the ER complaining of left hip pain he reported having a fall the day before, he was found to have left femoral neck fracture, at the same time chest x-ray showed masslike density along the left lateral margin of the heart of 6.4 cm, CT chest with IV contrast showed a 5.1 cm solid spiculated mass in the lingular segment of the left upper lobe consistent with bronchogenic carcinoma.  Initial blood work in the ER showed a sodium level of 117 mEq/L, reason for this consultation.  Patient reports drinking large amount of beer throughout the day along with oral fluids including water.  He barely eats and mostly have a small amount of food at dinner but many times he skips dinner.  Patient denies any nausea or vomiting.  His medications prior to admission included lisinopril, Norco.      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  Sodium stable at 124 today     Hyperkalemia, 2/2 ACE inhibition, potassium 4.5, resolved, continue to hold lisinopril  HTN, to hold lisinopril, continue amlodipine  Macrocytic anemia, to obtain B12 and folate level  -------------------------------------------  Left upper lobe lung mass, likely bronchogenic carcinoma, biopsy ordered  History of CAD status post CABG  Alcohol abuse  Left hip fracture status post fall     Plan:    Avoid rapid correction of sodium > 8 mEq in 24 hours  Continue to monitor sodium level, BMP every 6 hours  Fluid restriction, 1.5 L   Continue to hold lisinopril         Electronically signed by ALLAN Will CNP on 11/28/2024 at 11:07 AM   MD:  I saw and evaluated the patient, performing the key elements of the service. I discussed the 
recommended for further   evaluation.         CT NEEDLE BIOPSY LUNG/MEDIASTINUM PERCUTANEOUS    (Results Pending)   XR CHEST INSPIRATION AND EXPIRATION    (Results Pending)   XR CHEST INSPIRATION AND EXPIRATION    (Results Pending)         Assessment   Active Hospital Problems    Diagnosis     Hyponatremia [E87.1]     Other fracture of left femur, initial encounter for closed fracture (HCC) [S72.8X2A]     Alcohol dependence ([facility]) [F10.20]     Lung mass [R91.8]     Hypertension [I10]     Hyperlipidemia [E78.5]     Mixed bipolar II disorder ([facility]) [F31.81]          Plan  Hyponatremia  Nephrology consulted, appreciate recommendations and discussion  Patient's sodium levels beginning to rise without further treatment  Shoot for a range of 7 to 10 mmol per 24-hour.  Likely secondary to beer [place] versus SIADH  Sodium level has overcorrected and he is received D5W  No signs of pontine and cerebellar swelling  Lung mass concerning for bronchogenic carcinoma  Pulmonology consulted for recommendations  Patient had biopsy with interventional radiology with 5 cores taken on 11/27  Continue to monitor  Alcohol dependence  Patient will be given as needed Valium twice a day instead of 4 times a day for his underlying panic attacks  He will also be placed on the clinical withdrawal scale  See orders  Consider addition of Librium if developing severe symptoms  Left femoral neck fracture, acute, following mechanical fall in his kitchen  Orthopedic surgery was consulted to see patient  They are recommending surgical intervention  Patient will need to be medically cleared prior to procedure with having a sodium greater than 130 and potassium less than 5.1  Additionally will discuss with pulmonology the prognosis and consideration of any further surgical procedures given the findings of his lung mass  Plan for surgery on 11/28 or 11/29 if sodium remains corrected and stable.  However, 11/28 is Thanksgiving.  Bipolar 2  Not 
Gideon Elliott, APRN - CNP on 12/5/2024 at 12:05 PM      This is confirmation that I have personally performed a substantial portion of medical decision making (>50%) related to this patient encounter.  The medications & laboratory data and imagery were discussed and adjusted where necessary. Key issues of the case were discussed among consultants.  Review of CNP documentation was conducted and revisions were made as appropriate. I agree with the above documented exam, problem list and plan of care with the following additions:    Results of CT guided biopsy of the lung were as follows:     Lung Biopsy, Needle Cores. Left:         Limited fibrotic lung tissue with fibroelastosis, pigmented   histiocytes, lymphoplasmacytic inflammation, and scant atypical   epithelial cells. (See comment)     Given patient's risk factors we will schedule him for an outpatient PET scan.  He will have a follow-up with Dr. Dukes once his PET scan is done.  Depending on the results might benefit from another tissue biopsy or SBRT.      Adeline Mcmahan MD     
PORTABLE   Final Result   1. Interval placement of a left-sided chest tube with marked expansion of the   left lung.   2. Persistent left lung mass.         XR HIP LEFT (2-3 VIEWS)   Final Result   Satisfactory alignment status post placement of left hip arthroplasty.         XR CHEST PORTABLE   Final Result   Progressive enlargement of left pneumothorax now large volume with   essentially totally atelectatic left lung. No rightward mediastinal shift.         NM BONE SCAN WHOLE BODY   Final Result   No generalized scintigraphic pattern of osseous metastatic disease.      Healing fractures of the anterior left 3rd and 4th ribs as well as proximal   left femur.      Multifocal degenerative change as above.         MRI BRAIN W WO CONTRAST   Final Result   1. Possible small acute lacunar infarct in the right mid parietal corpus   callosum and centrum semiovale, in the high right mid parietal MCA territory.   It does not have the appearance of a metastatic lesion.   2. No evidence of metastatic disease to the brain.   3. Moderate age-appropriate atrophy and mild age-appropriate small vessel   ischemia.   4. Mild acute on chronic left maxillary sinusitis.         XR CHEST PORTABLE   Final Result   Left small volume pneumothorax similar to prior with basilar and lateral   predominance.         CT ABDOMEN PELVIS W IV CONTRAST Additional Contrast? Oral   Final Result   1. Partially visualized moderate to large left pneumothorax.   2. Trace left pleural effusion with associated atelectasis.   3. Left femoral neck fracture with surrounding soft tissue swelling.   4. Mild nonspecific urinary bladder wall thickening likely from incomplete   distension or cystitis. Recommend correlation with urinalysis.   5. Diverticulosis and colonic fecal retention.   6. Hyperdense material in the gallbladder possibly related to vicarious   excretion of contrast.   7. Punctate pancreatic calcifications suggestive of chronic pancreatitis.       
glycol (GLYCOLAX) packet 17 g  17 g Oral Daily César Miller DO   17 g at 12/03/24 0827    enoxaparin (LOVENOX) injection 40 mg  40 mg SubCUTAneous Daily César Miller DO   40 mg at 12/04/24 0825    lidocaine 1 % injection 5 mL  5 mL IntraDERmal Once Abilio Dukes DO        vitamin B-12 (CYANOCOBALAMIN) tablet 1,000 mcg  1,000 mcg Oral Daily Ani Andresw MD   1,000 mcg at 12/04/24 0824    albuterol (PROVENTIL) (2.5 MG/3ML) 0.083% nebulizer solution 2.5 mg  2.5 mg Nebulization Q6H PRN Abilio Dukes DO        ferrous sulfate (IRON 325) tablet 325 mg  325 mg Oral Daily with breakfast Ani Andrews MD   325 mg at 12/04/24 0823    folic acid (FOLVITE) tablet 1 mg  1 mg Oral Daily Gus Uriarte DO   1 mg at 12/04/24 0823    melatonin tablet 3 mg  3 mg Oral Nightly PRN Gus Uriarte DO   3 mg at 12/03/24 2008    thiamine tablet 100 mg  100 mg Oral Daily Yeyo Mcgovern DO   100 mg at 12/04/24 0824    LORazepam (ATIVAN) tablet 1 mg  1 mg Oral Q1H PRN Yeyo Mcgovern DO   1 mg at 12/01/24 1420    Or    LORazepam (ATIVAN) tablet 2 mg  2 mg Oral Q1H PRN Yeyo Mcgovern DO        Or    LORazepam (ATIVAN) tablet 3 mg  3 mg Oral Q1H PRN Yeyo Mcgovern DO        Or    LORazepam (ATIVAN) tablet 4 mg  4 mg Oral Q1H PRN Yeyo Mcgovern DO        tamsulosin (FLOMAX) capsule 0.4 mg  0.4 mg Oral Daily Abilio Serra MD   0.4 mg at 12/04/24 0823    nitroGLYCERIN (NITROSTAT) SL tablet 0.4 mg  0.4 mg SubLINGual Q5 Min PRN Abilio Serra MD        amLODIPine (NORVASC) tablet 5 mg  5 mg Oral Daily Abilio Serra MD   5 mg at 12/04/24 0823    [Held by provider] lisinopril (PRINIVIL;ZESTRIL) tablet 20 mg  20 mg Oral Daily Abilio Serra MD        tiZANidine (ZANAFLEX) tablet 2 mg  2 mg Oral TID Abilio Serra MD   2 mg at 12/04/24 2015    potassium chloride (KLOR-CON M) extended release tablet 40 mEq  40 mEq Oral PRN Abilio Serra MD        Or    potassium bicarb-citric acid (EFFER-K) effervescent tablet 40 mEq  40 
Acid  CIWA scale with prn Ativan -- did require a dose 11/28  Continue Valium prn     Macrocytic Anemia  Fe/Ferritin/TIBC/Folate/B12 noted  Stool for OB ordered but not obtained  Monitor H&H  Transfuse to keep hemoglobin >7.0    Follow labs   DVT prophylaxis  Please see orders for further management and care.  Discharge plan: will require SNF when stable following repair of left hip -- not today    The pertinent details of this case were discussed with Dr. Serra.    Electronically signed by ALLAN Lloyd CNP on 11/29/2024 at 8:16 AM    I can be reached through "[employer].

## 2024-12-05 NOTE — DISCHARGE SUMMARY
and/or use of iterative reconstruction technique. COMPARISON Portable chest 11/26/2024. FINDINGS Thoracic aortic caliber is normal.  There is an enlarged anterior left hilar lymph node measuring 2.1 cm, image 68, series 303.  There are coronary artery calcifications.  There are no pleural or pericardial effusions.  The adrenal glands are normal. Lung window images: Right lung: Upper lobe emphysema.  There is a solid spiculated nodule in the posteromedial left upper lobe measuring approximately 1.2 x 0.9 cm, image 47, series 301. Minor scarring in the medial segment of the right middle lobe. Left lung: Upper lobe emphysema. Solid spiculated mass in the lingular segment measuring 4.6 x 5.1 x 4.1 cm, image 76, series 301.  Along the anterior aspect of the mass there is a satellite nodule measuring 6 mm, image 74. Bones: There are no lytic or blastic osseous lesions.  There are healing non comminuted nondisplaced fractures of the anterior left 3rd and 4th ribs. IMPRESSION 1. 5.1 cm solid spiculated mass in the lingular segment left upper lobe with adjacent subcentimeter satellite nodule.  Findings consistent with bronchogenic carcinoma.  There is an enlarged left hilar lymph node likely metastatic. 2. 1.2 cm spiculated nodule in the right upper lobe suspicious for 2nd, primary neoplasm. 3. Healing non comminuted nondisplaced fractures of the anterior left 3rd and 4th ribs.     XR HIP 2-3 VW W PELVIS LEFT    Result Date: 11/26/2024  EXAMINATION: ONE XRAY VIEW OF THE PELVIS AND TWO XRAY VIEWS LEFT HIP 11/26/2024 7:19 am COMPARISON: None. HISTORY: ORDERING SYSTEM PROVIDED HISTORY: pain TECHNOLOGIST PROVIDED HISTORY: Reason for exam:->pain FINDINGS: Acute fracture involving the left femoral neck subcapital region with foreshortening and mild displacement.  Left femoral head remains grossly well approximating of the left acetabular cup     Acute left femoral neck fracture with foreshortening and mild displacement.     XR

## 2024-12-05 NOTE — PLAN OF CARE
Problem: ABCDS Injury Assessment  Goal: Absence of physical injury  12/5/2024 1136 by Grisel Zapien RN  Outcome: Progressing     Problem: Skin/Tissue Integrity  Goal: Absence of new skin breakdown  Description: 1.  Monitor for areas of redness and/or skin breakdown  2.  Assess vascular access sites hourly  3.  Every 4-6 hours minimum:  Change oxygen saturation probe site  4.  Every 4-6 hours:  If on nasal continuous positive airway pressure, respiratory therapy assess nares and determine need for appliance change or resting period.  12/5/2024 1136 by Grisel Zapien RN  Outcome: Progressing     Problem: Discharge Planning  Goal: Discharge to home or other facility with appropriate resources  12/5/2024 1136 by Grisel Zapien RN  Outcome: Progressing     Problem: Safety - Adult  Goal: Free from fall injury  12/5/2024 1136 by Grisel Zapien RN  Outcome: Progressing     Problem: Pain  Goal: Verbalizes/displays adequate comfort level or baseline comfort level  12/5/2024 1136 by Grisel Zapien RN  Outcome: Progressing     Problem: Respiratory - Adult  Goal: Achieves optimal ventilation and oxygenation  12/5/2024 1136 by Grisel Zapien RN  Outcome: Progressing     Problem: Skin/Tissue Integrity - Adult  Goal: Skin integrity remains intact  12/5/2024 1136 by Grisel Zapien RN  Outcome: Progressing     Problem: Musculoskeletal - Adult  Goal: Return mobility to safest level of function  12/5/2024 1136 by Grisel Zapien RN  Outcome: Progressing     Problem: Musculoskeletal - Adult  Goal: Maintain proper alignment of affected body part  12/5/2024 1136 by Grisel Zapien RN  Outcome: Progressing     Problem: Musculoskeletal - Adult  Goal: Return ADL status to a safe level of function  12/5/2024 1136 by Grisel Zapien RN  Outcome: Progressing     Problem: Genitourinary - Adult  Goal: Absence of urinary retention  12/5/2024 1136 by Grisel Zapien RN  Outcome: Progressing     Problem: Genitourinary -

## 2024-12-05 NOTE — DISCHARGE INSTRUCTIONS
Removed hip dressing - clean dry intact, open to air.   Weight bearing as tolerated.   Been using abductor pillow

## 2024-12-05 NOTE — CARE COORDINATION
CASE MANAGEMENT.....In anticipation for poss dc today to [place], patient placed on \"will call\" with [facility] ([phone]). Per pcp, check with consults for dc. Ok per pulm. Await cardio/renal and hemonc input. Now having episodes of afib-cardio started eliquis. Continue toprol xl bid. On Na tabs tid. Na 131 this am. Precert good through Friday 12/6/24. N 17 in [facility]. Ambulette forms/[id number] in chart. Will follow.

## 2025-01-03 ENCOUNTER — TELEPHONE (OUTPATIENT)
Dept: CASE MANAGEMENT | Age: 75
End: 2025-01-03

## 2025-01-03 ENCOUNTER — OFFICE VISIT (OUTPATIENT)
Dept: ONCOLOGY | Age: 75
End: 2025-01-03
Payer: MEDICARE

## 2025-01-03 ENCOUNTER — TELEPHONE (OUTPATIENT)
Dept: INFUSION THERAPY | Age: 75
End: 2025-01-03

## 2025-01-03 ENCOUNTER — HOSPITAL ENCOUNTER (OUTPATIENT)
Dept: INFUSION THERAPY | Age: 75
Discharge: HOME | End: 2025-01-03
Payer: MEDICARE

## 2025-01-03 VITALS
DIASTOLIC BLOOD PRESSURE: 53 MMHG | WEIGHT: 127.2 LBS | TEMPERATURE: 98.2 F | SYSTOLIC BLOOD PRESSURE: 111 MMHG | BODY MASS INDEX: 19.28 KG/M2 | HEART RATE: 68 BPM | HEIGHT: 68 IN | OXYGEN SATURATION: 100 %

## 2025-01-03 DIAGNOSIS — D64.9 ANEMIA, UNSPECIFIED TYPE: Primary | ICD-10-CM

## 2025-01-03 DIAGNOSIS — R91.8 LUNG MASS: ICD-10-CM

## 2025-01-03 DIAGNOSIS — D64.9 ANEMIA, UNSPECIFIED TYPE: ICD-10-CM

## 2025-01-03 LAB
BASOPHILS # BLD: 0.05 K/UL (ref 0–0.2)
BASOPHILS NFR BLD: 1 % (ref 0–2)
EOSINOPHIL # BLD: 0.07 K/UL (ref 0.05–0.5)
EOSINOPHILS RELATIVE PERCENT: 1 % (ref 0–6)
ERYTHROCYTE [DISTWIDTH] IN BLOOD BY AUTOMATED COUNT: 16.4 % (ref 11.5–15)
FERRITIN SERPL-MCNC: 344 NG/ML
HCT VFR BLD AUTO: 31.8 % (ref 37–54)
HGB BLD-MCNC: 9.9 G/DL (ref 12.5–16.5)
IMM GRANULOCYTES # BLD AUTO: 0.08 K/UL (ref 0–0.58)
IMM GRANULOCYTES NFR BLD: 1 % (ref 0–5)
IRON SATN MFR SERPL: 15 % (ref 20–55)
IRON SERPL-MCNC: 31 UG/DL (ref 59–158)
LYMPHOCYTES NFR BLD: 0.88 K/UL (ref 1.5–4)
LYMPHOCYTES RELATIVE PERCENT: 10 % (ref 20–42)
MCH RBC QN AUTO: 31.8 PG (ref 26–35)
MCHC RBC AUTO-ENTMCNC: 31.1 G/DL (ref 32–34.5)
MCV RBC AUTO: 102.3 FL (ref 80–99.9)
MONOCYTES NFR BLD: 0.8 K/UL (ref 0.1–0.95)
MONOCYTES NFR BLD: 9 % (ref 2–12)
NEUTROPHILS NFR BLD: 79 % (ref 43–80)
NEUTS SEG NFR BLD: 6.87 K/UL (ref 1.8–7.3)
PLATELET # BLD AUTO: 491 K/UL (ref 130–450)
PMV BLD AUTO: 9.3 FL (ref 7–12)
RBC # BLD AUTO: 3.11 M/UL (ref 3.8–5.8)
TIBC SERPL-MCNC: 205 UG/DL (ref 250–450)
WBC OTHER # BLD: 8.8 K/UL (ref 4.5–11.5)

## 2025-01-03 PROCEDURE — 85025 COMPLETE CBC W/AUTO DIFF WBC: CPT

## 2025-01-03 PROCEDURE — 36415 COLL VENOUS BLD VENIPUNCTURE: CPT

## 2025-01-03 PROCEDURE — 3078F DIAST BP <80 MM HG: CPT | Performed by: INTERNAL MEDICINE

## 2025-01-03 PROCEDURE — 83550 IRON BINDING TEST: CPT

## 2025-01-03 PROCEDURE — 3074F SYST BP LT 130 MM HG: CPT | Performed by: INTERNAL MEDICINE

## 2025-01-03 PROCEDURE — 82728 ASSAY OF FERRITIN: CPT

## 2025-01-03 PROCEDURE — 1123F ACP DISCUSS/DSCN MKR DOCD: CPT | Performed by: INTERNAL MEDICINE

## 2025-01-03 PROCEDURE — 1125F AMNT PAIN NOTED PAIN PRSNT: CPT | Performed by: INTERNAL MEDICINE

## 2025-01-03 PROCEDURE — 83540 ASSAY OF IRON: CPT

## 2025-01-03 PROCEDURE — 1160F RVW MEDS BY RX/DR IN RCRD: CPT | Performed by: INTERNAL MEDICINE

## 2025-01-03 PROCEDURE — 1159F MED LIST DOCD IN RCRD: CPT | Performed by: INTERNAL MEDICINE

## 2025-01-03 PROCEDURE — 99205 OFFICE O/P NEW HI 60 MIN: CPT | Performed by: INTERNAL MEDICINE

## 2025-01-03 SDOH — ECONOMIC STABILITY: HOUSING INSECURITY: PLEASE ASSESS YOUR PATIENT'S LEVEL OF DISTRESS CONCERNING HOUSING (SCALE FROM 1-10): 0

## 2025-01-03 NOTE — TELEPHONE ENCOUNTER
Spoke to patient to remind him that Dr. Michael Rodriguez wants him to take his iron pills daily, he stated that he would and that he took a pill as soon as he got home from his appointment.

## 2025-01-03 NOTE — TELEPHONE ENCOUNTER
Met with patient and his brother, Deniz, during his initial consultation with Dr. Michael Rodriguez  for his  recent lung mass diagnosis. Introduced myself and explained my role with patients receiving treatment at our center.  Patient was friendly and receptive. Instructed on next steps including lab work today, referrals to pulmonologist and radiation oncologist and PET scan per Dr. Michael Rodriguez's recommendations and follow up care. Provided patient with transportation resource list and literature on support group services both in person and on line and PET scan patient prep handout. Patent brother requested information on Lung Cancer so Patient Resource Lung Cancer guide given to brother. Reviewed resources available to him such as Social Work, Financial Navigator and Dietitian. Patient states that he lives with his brother where he is already received home PT and OT, he had Medicare for his medical coverage but does state that he has a poor appetite since being discharged so will meet with dietitian at this time. New patient nursing assessment, medical history, surgical history, family history completed. Medication list reviewed and updated. Patient states that he does not take several of his medications that are on list but that he does have them at home to take.  Brother plans on taking list to next PCP appointment to review with PCP to see what patient really should be taking.  Provided with my contact information and instructed patient to call me with questions or concerns. Verbalizes understanding. Patient appreciative of visit. Will continue to follow. Melinda DALY, RN-OCN Nurse Navigator

## 2025-01-03 NOTE — PROGRESS NOTES
Mike Morrell Jr.  1/3/2025  Ht Readings from Last 1 Encounters:   01/03/25 1.727 m (5' 8\")     Wt Readings from Last 10 Encounters:   01/03/25 57.7 kg (127 lb 3.2 oz)   12/05/24 62.1 kg (136 lb 14.4 oz)   05/20/24 61.2 kg (135 lb)   10/10/17 61.5 kg (135 lb 8 oz)   01/30/17 68.5 kg (151 lb)   07/03/14 67.6 kg (149 lb)   11/01/13 69.9 kg (154 lb)   10/29/13 69.9 kg (154 lb)     BMI: 19.34    Assessment: Met w/ pt and brother, Deniz, for introduction during initial consult for lung masses. Pt has lost 9# over last month (6.6%). He consumes 1 large meal + 1 snack daily. He does not utilize any ONS products. He reports consumption of at least 4 beers daily. Reviewed role of oncology dietitian in pt's care. Provided w/ resources to optimize nutritional intake, encouraging smaller more frequent meals and snacks w/ protein at each. Pt provided w/ samples of Ensure Complete and [place] Breakfast Essentials, encouraged to drink at least once daily. Deniz openly more interested in nutrition education than pt. Await POC. Encouraged to contact this clinician as needed. Will follow as able.    Weight change: -6.6% x1 month  Appetite: poor  Nutritional Side Effects: anorexia, early satiety  Calculated Needs: 30-32 kcal/kg CBW =  kcal, 1.3-1.5 gm/kg CBW = 75-85 gm pro, 1 ml/kcal =  ml fluids  Malnutrition Status: Severe  Nutrition Diagnosis: Severe malnutrition r/t lung masses AEB 6.6% wt loss x1 month, <75% nutrient intake x >1 month, severe muscle wasting.    Recommendations: Pt to consume at least 5 small meals/day + at least once daily high calorie/high protein ONS    Catina Kenyon, MS, RD, LD

## 2025-01-03 NOTE — PROGRESS NOTES
Patient did NOT stop at check out window, left without AVS.  Patient has MYCHART.    
[facility] MED ONCOLOGY  [address]  [facility] [address]  Dept: [phone]  Loc: [phone]  Attending Consult Note      Reason for Visit:   Bilateral lung masses.    Referring Physician:   Keely Diop, APRN - CNP     PCP:  Eric Jackson MD    History of Present Illness:     Mr. Morrell is a 74-year-old gentleman, with a past medical history significant for hyperlipidemia, alcohol abuse, and hypertension, he is a former smoker, who had presented to the ED on 11/26/2024 with left hip pain status post mechanical fall, hip x-ray had revealed acute fracture involving the left femoral neck subcapital region with foreshortening and mild displacement.  Chest x-ray had revealed masslike density along the left lateral margin of the heart, 6.4 cm focus somewhat indeterminate for vascular or cardiac origin versus adjacent mass in the left midlung, he subsequently had a CT scan of the chest done revealing a 5.1 cm solid spiculated mass in the lingular segment of the left upper lobe with adjacent subcentimeter satellite nodule, 1.2 cm spiculated nodule in the right upper lobe suspicious for a second primary neoplasm.  Healing on community nondisplaced fractures of the anterior left third and fourth rib.  The patient underwent on 11/27/2024 biopsy of the left lung mass.  Pathology was consistent with with fibrotic lung tissue with fibroelastosis, pigmented histiocytes, lymphoplasmacytic inflammation, and scant atypical cells.  The patient had a left pneumothorax requiring chest tube placement.  The sample was insufficient for the diagnosis of malignancy.  Upon presentation blood work was remarkable for a sodium level of 117, calcium 8.8.  The patient was seen by nephrology, was diagnosed with SIADH.  CT scans of the abdomen, pelvis, bone scan and brain MRI were negative for metastatic disease.  PET scan was ordered when he was inpatient, has not been done yet.  
leisure/social activity due weakness, fatigue or pain: No     ARE ANY OF THE ABOVE ARE ANSWERED YES: Yes - but NO OT referral request sent due to patient already being seen by OT.          PT ASSESSMENT FOR REFERRAL    Have you had any recent falls in past 2 months: Yes     Do you have difficulty  going up/down stairs: Yes     Are you having difficulty walking: Yes     Do you often hold onto furniture/environmental supports or feel off balance when you are walking: No     Do you need to take rest breaks when you are walking: Yes     Any pain on scale of 1-10 that limits your mobility: Yes 7/10    ARE ANY OF THE ABOVE ARE ANSWERED YES: Yes - but NO PT referral request sent due to patient already being seen by PT.         PREHAB AUDIOLOGY REFERRAL    - Is patient planned to receive Cisplatin? No. This patient is not planned to start Cisplatin.    - Is patient complaining of new onset hearing loss? No. Patient is not complaining of new onset hearing loss.        Melinda Marcus RN

## 2025-01-03 NOTE — TELEPHONE ENCOUNTER
Called and scheduled patient PET scan appointment at his request at [facility] on 1/14/25 at noon with arrival time of 11:30.  Patient it to follow the PET scan patient prep handout that was given to him in the office today.  Prior PET scan appointment had been cancelled due to authorization not obtained.  Call authorization department and Britta stated that authorization is still pending but ok to schedule out at least a week and she will continue to work on getting authorization.  This will be checked couple days prior to appointment.  Patient was called with the above information and he is agreeable to appointment and appreciative of assistance.

## 2025-01-06 ENCOUNTER — TELEPHONE (OUTPATIENT)
Dept: ONCOLOGY | Age: 75
End: 2025-01-06

## 2025-01-06 ENCOUNTER — TELEPHONE (OUTPATIENT)
Dept: CASE MANAGEMENT | Age: 75
End: 2025-01-06

## 2025-01-06 NOTE — TELEPHONE ENCOUNTER
Called and spoke with Dr. Dukes's staff regarding urgent referral.  They state they have referral and will be calling patient to get him scheduled.

## 2025-01-06 NOTE — TELEPHONE ENCOUNTER
Attempted to contact pt re: positive distress screen.  Pt is 74-year-old male evaluated by medical oncology for bilateral lung masses.  Message left requesting callback.     1/3/2025   Distress Tool Screening    Please select the number that describes how much distress you have experienced in the PAST WEEK: 8    Please select the number that describes how much distress you have experienced TODAY: 8    Work, concerns about job: 0 (None)    Finances, bills, insurance: 5    Housin (None)    Transportation: 0 (None)    Availability of caregivers: 0 (None)    Sadness/Depression: 8    Anxiety/Worry/Fear: 10 (Severe)    Concerns about appearance: 10 (Severe)    Connection to a higher power: 0 (None)    Sense of meaning and purpose: 0 (None)    Support from my spiritual community: 0 (None)    Nausea: 0 (None)    Eating/Loss of appetite: 0 (None)    Pain: 9    Fatigue: 9      MARILU Vásquez, LISW-S  Oncology Social Worker   [phone]

## 2025-01-08 PROBLEM — I50.9 CONGESTIVE HEART FAILURE (HCC): Status: ACTIVE | Noted: 2025-01-08

## 2025-01-08 PROBLEM — F41.9 ANXIETY DISORDER: Status: ACTIVE | Noted: 2025-01-08

## 2025-01-14 ENCOUNTER — HOSPITAL ENCOUNTER (OUTPATIENT)
Dept: PET IMAGING | Age: 75
Discharge: HOME | End: 2025-01-16
Attending: INTERNAL MEDICINE
Payer: MEDICARE

## 2025-01-14 DIAGNOSIS — R91.8 LUNG MASS: ICD-10-CM

## 2025-01-14 LAB — GLUCOSE BLD-MCNC: 79 MG/DL (ref 74–99)

## 2025-01-14 PROCEDURE — A9609 HC RX DIAGNOSTIC RADIOPHARMACEUTICAL: HCPCS | Performed by: RADIOLOGY

## 2025-01-14 PROCEDURE — 78815 PET IMAGE W/CT SKULL-THIGH: CPT

## 2025-01-14 PROCEDURE — 3430000000 HC RX DIAGNOSTIC RADIOPHARMACEUTICAL: Performed by: RADIOLOGY

## 2025-01-14 PROCEDURE — 82962 GLUCOSE BLOOD TEST: CPT

## 2025-01-14 RX ORDER — FLUDEOXYGLUCOSE F 18 200 MCI/ML
15 INJECTION, SOLUTION INTRAVENOUS
Status: COMPLETED | OUTPATIENT
Start: 2025-01-14 | End: 2025-01-14

## 2025-01-14 RX ADMIN — FLUDEOXYGLUCOSE F 18 15 MILLICURIE: 200 INJECTION, SOLUTION INTRAVENOUS at 12:35

## 2025-01-17 ENCOUNTER — HOSPITAL ENCOUNTER (OUTPATIENT)
Dept: RADIATION ONCOLOGY | Age: 75
Discharge: HOME | End: 2025-01-17
Payer: MEDICARE

## 2025-01-17 VITALS
SYSTOLIC BLOOD PRESSURE: 111 MMHG | TEMPERATURE: 98.2 F | HEART RATE: 71 BPM | DIASTOLIC BLOOD PRESSURE: 59 MMHG | BODY MASS INDEX: 19.31 KG/M2 | WEIGHT: 127 LBS | RESPIRATION RATE: 18 BRPM

## 2025-01-17 DIAGNOSIS — C34.12 MALIGNANT NEOPLASM OF UPPER LOBE OF LEFT LUNG (HCC): Primary | ICD-10-CM

## 2025-01-17 PROCEDURE — 99205 OFFICE O/P NEW HI 60 MIN: CPT

## 2025-01-17 PROCEDURE — 99205 OFFICE O/P NEW HI 60 MIN: CPT | Performed by: RADIOLOGY

## 2025-01-17 RX ORDER — HYDROCODONE BITARTRATE AND ACETAMINOPHEN 7.5; 325 MG/1; MG/1
1 TABLET ORAL EVERY 12 HOURS PRN
COMMUNITY

## 2025-01-17 RX ORDER — BENZONATATE 200 MG/1
200 CAPSULE ORAL DAILY
COMMUNITY

## 2025-01-17 RX ORDER — DIAZEPAM ORAL SOLUTION (CONCENTRATE) 5 MG/ML
5 SOLUTION ORAL EVERY 8 HOURS PRN
COMMUNITY

## 2025-01-17 ASSESSMENT — PAIN DESCRIPTION - ORIENTATION: ORIENTATION: LEFT

## 2025-01-17 ASSESSMENT — PAIN DESCRIPTION - PAIN TYPE: TYPE: SURGICAL PAIN

## 2025-01-17 ASSESSMENT — PAIN SCALES - GENERAL: PAINLEVEL_OUTOF10: 5

## 2025-01-17 NOTE — PROGRESS NOTES
Mike Morrell Marquis  1950 74 y.o.      Referring Physician: Dr Ani Vargas    PCP: Eric Jackson MD     Vitals:    01/17/25 1258   BP: (!) 111/59   Pulse: 71   Resp: 18   Temp: 98.2 °F (36.8 °C)        Wt Readings from Last 3 Encounters:   01/17/25 57.6 kg (127 lb)   01/08/25 56.1 kg (123 lb 9.6 oz)   01/03/25 57.7 kg (127 lb 3.2 oz)        Body mass index is 19.31 kg/m².               Chief Complaint: No chief complaint on file.         Cancer Staging   No matching staging information was found for the patient.      Prior Radiation Therapy? NO    Concurrent Chemo/radiation? NO    Prior Chemotherapy? NO    Prior Hormonal Therapy? NO    Head and Neck Cancer? No, patient does NOT have HN cancer.          Current Outpatient Medications   Medication Sig Dispense Refill    benzonatate (TESSALON) 200 MG capsule Take 1 capsule by mouth daily      HYDROcodone-acetaminophen (NORCO) 7.5-325 MG per tablet Take 1 tablet by mouth every 12 hours as needed for Pain. Max Daily Amount: 2 tablets      diazePAM (VALIUM) 5 MG/ML solution Take 1 mL by mouth every 8 hours as needed. 1 to 3 times/day PRN Max Daily Amount: 15 mg      mirtazapine (REMERON) 15 MG tablet Take 1 tablet by mouth nightly      folic acid (FOLVITE) 1 MG tablet Take 1 tablet by mouth daily      atorvastatin (LIPITOR) 40 MG tablet Take 1 tablet by mouth nightly      aspirin 81 MG chewable tablet Take 1 tablet by mouth daily      metoprolol succinate (TOPROL XL) 25 MG extended release tablet Take 1 tablet by mouth in the morning and at bedtime (Patient taking differently: Take 1 tablet by mouth daily)      apixaban (ELIQUIS) 5 MG TABS tablet Take 1 tablet by mouth 2 times daily      albuterol (PROVENTIL) (2.5 MG/3ML) 0.083% nebulizer solution Take 3 mLs by nebulization every 6 hours as needed for Wheezing or Shortness of Breath      carisoprodol (SOMA) 350 MG tablet Take 1 tablet by mouth 3 times daily.      amLODIPine (NORVASC) 5 MG tablet Take 1 tablet 
participate in multidisciplinary management of this remarkable and pleasant patient.      Nasrin Valladares MD    Department of Radiation Oncology  [facility] ([facility]: [phone] (FAX: [phone])  [facility]: [phone] (FAX: [phone])  [facility]:  [phone] (FAX:  [phone])

## 2025-01-29 ENCOUNTER — HOSPITAL ENCOUNTER (OUTPATIENT)
Dept: INFUSION THERAPY | Age: 75
Discharge: HOME | End: 2025-01-29

## 2025-01-29 ENCOUNTER — TELEPHONE (OUTPATIENT)
Dept: ONCOLOGY | Age: 75
End: 2025-01-29

## 2025-01-29 ENCOUNTER — OFFICE VISIT (OUTPATIENT)
Dept: ONCOLOGY | Age: 75
End: 2025-01-29
Payer: MEDICARE

## 2025-01-29 VITALS
TEMPERATURE: 98 F | HEIGHT: 68 IN | OXYGEN SATURATION: 100 % | SYSTOLIC BLOOD PRESSURE: 167 MMHG | BODY MASS INDEX: 19.4 KG/M2 | HEART RATE: 68 BPM | WEIGHT: 128 LBS | DIASTOLIC BLOOD PRESSURE: 82 MMHG

## 2025-01-29 DIAGNOSIS — R91.8 LUNG MASS: ICD-10-CM

## 2025-01-29 DIAGNOSIS — D64.9 ANEMIA, UNSPECIFIED TYPE: Primary | ICD-10-CM

## 2025-01-29 PROCEDURE — 1123F ACP DISCUSS/DSCN MKR DOCD: CPT | Performed by: INTERNAL MEDICINE

## 2025-01-29 PROCEDURE — 99214 OFFICE O/P EST MOD 30 MIN: CPT | Performed by: INTERNAL MEDICINE

## 2025-01-29 PROCEDURE — 3079F DIAST BP 80-89 MM HG: CPT | Performed by: INTERNAL MEDICINE

## 2025-01-29 PROCEDURE — 3077F SYST BP >= 140 MM HG: CPT | Performed by: INTERNAL MEDICINE

## 2025-01-29 RX ORDER — FERROUS SULFATE 325(65) MG
325 TABLET ORAL
Qty: 90 TABLET | Refills: 1 | Status: SHIPPED | OUTPATIENT
Start: 2025-01-29

## 2025-01-29 NOTE — TELEPHONE ENCOUNTER
Met with pt and pt's daughter in conjunction with medical oncology visit re: positive distress screen.  Pt is 74-year-old male being evaluated for bilateral lung masses.  Introduced self and role of oncology social work.  Reviewed distress screen dated 1/3/2025:      1/3/2025   Distress Tool Screening     Please select the number that describes how much distress you have experienced in the PAST WEEK: 8    Please select the number that describes how much distress you have experienced TODAY: 8    Work, concerns about job: 0 (None)    Finances, bills, insurance: 5    Housin (None)    Transportation: 0 (None)    Availability of caregivers: 0 (None)    Sadness/Depression: 8    Anxiety/Worry/Fear: 10 (Severe)    Concerns about appearance: 10 (Severe)    Connection to a higher power: 0 (None)    Sense of meaning and purpose: 0 (None)    Support from my spiritual community: 0 (None)    Nausea: 0 (None)    Eating/Loss of appetite: 0 (None)    Pain: 9    Fatigue: 9      Practical:  Pt currently residing with brother in [place] with plans to return to his apartment in [place].  His daughter inquired about programs to assist with ADLs; provided education on [facility].  Daughter indicated that she believes that he will be over income for assistance; advised that [facility] will be able to provide referrals to additional services even if he does not qualify for [employer] services.      Emotional:  Pt discussed some anxiety surrounding pending dx and treatment plan.  Provided support and encouraged pt to notify clinic should symptoms worsen or persist.      Spiritual:  No immediate needs identified at this time.    Physical:  Pt discussed ongoing pain due to back issues.  Pt's daughter noted that he is established with orthopedics.      No additional needs identified at this time.  Reviewed role of oncology SW and encouraged pt to notify this provider if additional needs arise.    MARILU Vásquez,

## 2025-01-29 NOTE — PROGRESS NOTES
Patient refused AVS, Has My Chart  
on 11/27/2024 biopsy of the left lung mass.  Pathology was consistent with with fibrotic lung tissue with fibroelastosis, pigmented histiocytes, lymphoplasmacytic inflammation, and scant atypical cells.  The patient had a left pneumothorax requiring chest tube placement.  The sample was insufficient for the diagnosis of malignancy.  Upon presentation blood work was remarkable for a sodium level of 117, calcium 8.8.  The patient was seen by nephrology, was diagnosed with SIADH.  CT scans of the abdomen, pelvis, bone scan and brain MRI were negative for metastatic disease.  PET scan was ordered when he was inpatient, has not been done yet.  The patient had left hemiarthroplasty done 11/30/2024.     The patient has a left lung mass measuring 5.1 cm, concerning for lung malignancy, also 1.2 cm spiculated nodule in the right upper lobe, could be metastatic lesion, biopsy of the left lung mass had revealed atypical cells, however the specimen was insufficient for diagnosis of malignancy.  The patient had a pneumothorax following the biopsy.  Will refer to pulmonary for evaluation for biopsies.  CT scan of the abdomen/pelvis, bone scan and brain MRI reviewed, without evidence of distant metastatic disease, PET scan was done on 1/14/2025, the results/images were reviewed with the patient and his daughter, revealing 4.5 cm left upper lobe pulmonary mass with metastatic disease to a left hilar node, patchy density in the posterior medial right upper lobe had increased in size since 11/26/2024, and is mildly hypermetabolic, SUV max of 3.4, both inflammatory and neoplastic etiologies must be considered, the patient is scheduled for bronchoscopy/EBUS on 2/6/2025, await pathology results.  Discussed treatment options once results are confirmed, it is unlikely that he would be a surgical candidate due to his comorbidities, and possible contralateral primary lung malignancy, treatment would likely be with chemo/RT followed by

## 2025-01-31 ENCOUNTER — TELEPHONE (OUTPATIENT)
Dept: ONCOLOGY | Age: 75
End: 2025-01-31

## 2025-01-31 DIAGNOSIS — R91.8 LUNG MASS: Primary | ICD-10-CM

## 2025-01-31 NOTE — TELEPHONE ENCOUNTER
----- Message from Dr. Ani Rodriguez MD sent at 1/31/2025 12:19 PM EST -----  Yes thx!!  ----- Message -----  From: Ana Islas RN  Sent: 1/30/2025   1:17 PM EST  To: Ani Rodriguez MD    Good afternoon,   Dr. Padron's office will not schedule patient in their office without PFT's. Can I place the order for this?  Please advise.

## 2025-02-05 ENCOUNTER — HOSPITAL ENCOUNTER (INPATIENT)
Age: 75
LOS: 1 days | Discharge: HOME | DRG: 643 | End: 2025-02-07
Attending: EMERGENCY MEDICINE | Admitting: INTERNAL MEDICINE
Payer: MEDICARE
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
## 2025-02-05 DIAGNOSIS — T14.8XXA MULTIPLE SKIN TEARS: ICD-10-CM

## 2025-02-05 DIAGNOSIS — I21.4 NSTEMI (NON-ST ELEVATED MYOCARDIAL INFARCTION) (HCC): Primary | ICD-10-CM

## 2025-02-05 PROCEDURE — 93005 ELECTROCARDIOGRAM TRACING: CPT | Performed by: EMERGENCY MEDICINE

## 2025-02-05 PROCEDURE — 99285 EMERGENCY DEPT VISIT HI MDM: CPT

## 2025-02-05 RX ORDER — ACETAMINOPHEN 650 MG
TABLET, EXTENDED RELEASE ORAL
Status: DISPENSED
Start: 2025-02-05 | End: 2025-02-06

## 2025-02-05 RX ORDER — 0.9 % SODIUM CHLORIDE 0.9 %
1000 INTRAVENOUS SOLUTION INTRAVENOUS ONCE
Status: COMPLETED | OUTPATIENT
Start: 2025-02-06 | End: 2025-02-06

## 2025-02-05 ASSESSMENT — PAIN DESCRIPTION - DESCRIPTORS: DESCRIPTORS: THROBBING

## 2025-02-05 ASSESSMENT — PAIN DESCRIPTION - ORIENTATION: ORIENTATION: RIGHT;LEFT

## 2025-02-05 ASSESSMENT — LIFESTYLE VARIABLES
HOW OFTEN DO YOU HAVE A DRINK CONTAINING ALCOHOL: NEVER
HOW MANY STANDARD DRINKS CONTAINING ALCOHOL DO YOU HAVE ON A TYPICAL DAY: PATIENT DOES NOT DRINK

## 2025-02-05 ASSESSMENT — PAIN - FUNCTIONAL ASSESSMENT
PAIN_FUNCTIONAL_ASSESSMENT: 0-10
PAIN_FUNCTIONAL_ASSESSMENT: PREVENTS OR INTERFERES SOME ACTIVE ACTIVITIES AND ADLS

## 2025-02-05 ASSESSMENT — PAIN SCALES - GENERAL: PAINLEVEL_OUTOF10: 8

## 2025-02-05 ASSESSMENT — PAIN DESCRIPTION - LOCATION: LOCATION: SHOULDER;HIP

## 2025-02-06 ENCOUNTER — APPOINTMENT (OUTPATIENT)
Dept: CT IMAGING | Age: 75
DRG: 643 | End: 2025-02-06
Payer: MEDICARE

## 2025-02-06 ENCOUNTER — APPOINTMENT (OUTPATIENT)
Dept: GENERAL RADIOLOGY | Age: 75
DRG: 643 | End: 2025-02-06
Payer: MEDICARE

## 2025-02-06 PROBLEM — I21.4 NSTEMI (NON-ST ELEVATED MYOCARDIAL INFARCTION) (HCC): Status: ACTIVE | Noted: 2025-02-06

## 2025-02-06 LAB
ALBUMIN SERPL-MCNC: 3.4 G/DL (ref 3.5–5.2)
ALBUMIN SERPL-MCNC: 3.5 G/DL (ref 3.5–5.2)
ALP SERPL-CCNC: 171 U/L (ref 40–129)
ALP SERPL-CCNC: 173 U/L (ref 40–129)
ALT SERPL-CCNC: 12 U/L (ref 0–40)
ALT SERPL-CCNC: 13 U/L (ref 0–40)
ANION GAP SERPL CALCULATED.3IONS-SCNC: 10 MMOL/L (ref 7–16)
ANION GAP SERPL CALCULATED.3IONS-SCNC: 11 MMOL/L (ref 7–16)
AST SERPL-CCNC: 31 U/L (ref 0–39)
AST SERPL-CCNC: 41 U/L (ref 0–39)
BASOPHILS # BLD: 0.05 K/UL (ref 0–0.2)
BASOPHILS NFR BLD: 0 % (ref 0–2)
BILIRUB SERPL-MCNC: 0.2 MG/DL (ref 0–1.2)
BILIRUB SERPL-MCNC: <0.2 MG/DL (ref 0–1.2)
BUN SERPL-MCNC: 18 MG/DL (ref 6–23)
BUN SERPL-MCNC: 21 MG/DL (ref 6–23)
CALCIUM SERPL-MCNC: 8.7 MG/DL (ref 8.6–10.2)
CALCIUM SERPL-MCNC: 8.7 MG/DL (ref 8.6–10.2)
CHLORIDE SERPL-SCNC: 94 MMOL/L (ref 98–107)
CHLORIDE SERPL-SCNC: 97 MMOL/L (ref 98–107)
CK SERPL-CCNC: 975 U/L (ref 20–200)
CO2 SERPL-SCNC: 22 MMOL/L (ref 22–29)
CO2 SERPL-SCNC: 25 MMOL/L (ref 22–29)
CREAT SERPL-MCNC: 1 MG/DL (ref 0.7–1.2)
CREAT SERPL-MCNC: 1.1 MG/DL (ref 0.7–1.2)
CREAT UR-MCNC: 44.2 MG/DL (ref 40–278)
EKG ATRIAL RATE: 104 BPM
EKG P AXIS: 83 DEGREES
EKG P-R INTERVAL: 158 MS
EKG Q-T INTERVAL: 364 MS
EKG QRS DURATION: 92 MS
EKG QTC CALCULATION (BAZETT): 478 MS
EKG R AXIS: 105 DEGREES
EKG T AXIS: 6 DEGREES
EKG VENTRICULAR RATE: 104 BPM
EOSINOPHIL # BLD: 0.04 K/UL (ref 0.05–0.5)
EOSINOPHILS RELATIVE PERCENT: 0 % (ref 0–6)
ERYTHROCYTE [DISTWIDTH] IN BLOOD BY AUTOMATED COUNT: 15.4 % (ref 11.5–15)
ERYTHROCYTE [DISTWIDTH] IN BLOOD BY AUTOMATED COUNT: 15.4 % (ref 11.5–15)
GFR, ESTIMATED: 70 ML/MIN/1.73M2
GFR, ESTIMATED: 84 ML/MIN/1.73M2
GLUCOSE SERPL-MCNC: 116 MG/DL (ref 74–99)
GLUCOSE SERPL-MCNC: 119 MG/DL (ref 74–99)
HCT VFR BLD AUTO: 32 % (ref 37–54)
HCT VFR BLD AUTO: 33.3 % (ref 37–54)
HGB BLD-MCNC: 10.2 G/DL (ref 12.5–16.5)
HGB BLD-MCNC: 10.6 G/DL (ref 12.5–16.5)
IMM GRANULOCYTES # BLD AUTO: 0.05 K/UL (ref 0–0.58)
IMM GRANULOCYTES NFR BLD: 0 % (ref 0–5)
INFLUENZA A BY PCR: NOT DETECTED
INFLUENZA B BY PCR: NOT DETECTED
LYMPHOCYTES NFR BLD: 0.75 K/UL (ref 1.5–4)
LYMPHOCYTES RELATIVE PERCENT: 6 % (ref 20–42)
MCH RBC QN AUTO: 30 PG (ref 26–35)
MCH RBC QN AUTO: 30 PG (ref 26–35)
MCHC RBC AUTO-ENTMCNC: 31.8 G/DL (ref 32–34.5)
MCHC RBC AUTO-ENTMCNC: 31.9 G/DL (ref 32–34.5)
MCV RBC AUTO: 94.1 FL (ref 80–99.9)
MCV RBC AUTO: 94.3 FL (ref 80–99.9)
MONOCYTES NFR BLD: 1.14 K/UL (ref 0.1–0.95)
MONOCYTES NFR BLD: 10 % (ref 2–12)
NEUTROPHILS NFR BLD: 83 % (ref 43–80)
NEUTS SEG NFR BLD: 9.85 K/UL (ref 1.8–7.3)
OSMOLALITY UR: 369 MOSM/KG (ref 300–900)
PARTIAL THROMBOPLASTIN TIME: 31.3 SEC (ref 24.5–35.1)
PARTIAL THROMBOPLASTIN TIME: 32.7 SEC (ref 24.5–35.1)
PARTIAL THROMBOPLASTIN TIME: 35.9 SEC (ref 24.5–35.1)
PLATELET # BLD AUTO: 326 K/UL (ref 130–450)
PLATELET # BLD AUTO: 358 K/UL (ref 130–450)
PMV BLD AUTO: 9.2 FL (ref 7–12)
PMV BLD AUTO: 9.3 FL (ref 7–12)
POTASSIUM SERPL-SCNC: 4.3 MMOL/L (ref 3.5–5)
POTASSIUM SERPL-SCNC: 4.4 MMOL/L (ref 3.5–5)
POTASSIUM, UR: 15.2 MMOL/L
PROT SERPL-MCNC: 6.6 G/DL (ref 6.4–8.3)
PROT SERPL-MCNC: 6.7 G/DL (ref 6.4–8.3)
RBC # BLD AUTO: 3.4 M/UL (ref 3.8–5.8)
RBC # BLD AUTO: 3.53 M/UL (ref 3.8–5.8)
SARS-COV-2 RDRP RESP QL NAA+PROBE: NOT DETECTED
SODIUM SERPL-SCNC: 127 MMOL/L (ref 132–146)
SODIUM SERPL-SCNC: 132 MMOL/L (ref 132–146)
SODIUM UR-SCNC: 71 MMOL/L
SPECIMEN DESCRIPTION: NORMAL
TROPONIN I SERPL HS-MCNC: 345 NG/L (ref 0–11)
TROPONIN I SERPL HS-MCNC: 421 NG/L (ref 0–11)
WBC OTHER # BLD: 11.9 K/UL (ref 4.5–11.5)
WBC OTHER # BLD: 9 K/UL (ref 4.5–11.5)

## 2025-02-06 PROCEDURE — 84484 ASSAY OF TROPONIN QUANT: CPT

## 2025-02-06 PROCEDURE — 6370000000 HC RX 637 (ALT 250 FOR IP): Performed by: NURSE PRACTITIONER

## 2025-02-06 PROCEDURE — 85027 COMPLETE CBC AUTOMATED: CPT

## 2025-02-06 PROCEDURE — 70450 CT HEAD/BRAIN W/O DYE: CPT

## 2025-02-06 PROCEDURE — 71045 X-RAY EXAM CHEST 1 VIEW: CPT

## 2025-02-06 PROCEDURE — 2060000000 HC ICU INTERMEDIATE R&B

## 2025-02-06 PROCEDURE — 80053 COMPREHEN METABOLIC PANEL: CPT

## 2025-02-06 PROCEDURE — 6370000000 HC RX 637 (ALT 250 FOR IP): Performed by: INTERNAL MEDICINE

## 2025-02-06 PROCEDURE — 2580000003 HC RX 258: Performed by: INTERNAL MEDICINE

## 2025-02-06 PROCEDURE — 6360000002 HC RX W HCPCS: Performed by: EMERGENCY MEDICINE

## 2025-02-06 PROCEDURE — 84300 ASSAY OF URINE SODIUM: CPT

## 2025-02-06 PROCEDURE — 96374 THER/PROPH/DIAG INJ IV PUSH: CPT

## 2025-02-06 PROCEDURE — 82550 ASSAY OF CK (CPK): CPT

## 2025-02-06 PROCEDURE — 82570 ASSAY OF URINE CREATININE: CPT

## 2025-02-06 PROCEDURE — 2580000003 HC RX 258: Performed by: EMERGENCY MEDICINE

## 2025-02-06 PROCEDURE — 87502 INFLUENZA DNA AMP PROBE: CPT

## 2025-02-06 PROCEDURE — 71275 CT ANGIOGRAPHY CHEST: CPT

## 2025-02-06 PROCEDURE — 93010 ELECTROCARDIOGRAM REPORT: CPT | Performed by: INTERNAL MEDICINE

## 2025-02-06 PROCEDURE — 84133 ASSAY OF URINE POTASSIUM: CPT

## 2025-02-06 PROCEDURE — 6370000000 HC RX 637 (ALT 250 FOR IP): Performed by: EMERGENCY MEDICINE

## 2025-02-06 PROCEDURE — 85730 THROMBOPLASTIN TIME PARTIAL: CPT

## 2025-02-06 PROCEDURE — 2500000003 HC RX 250 WO HCPCS: Performed by: NURSE PRACTITIONER

## 2025-02-06 PROCEDURE — 72125 CT NECK SPINE W/O DYE: CPT

## 2025-02-06 PROCEDURE — 85025 COMPLETE CBC W/AUTO DIFF WBC: CPT

## 2025-02-06 PROCEDURE — 87635 SARS-COV-2 COVID-19 AMP PRB: CPT

## 2025-02-06 PROCEDURE — 83935 ASSAY OF URINE OSMOLALITY: CPT

## 2025-02-06 RX ORDER — HEPARIN SODIUM 10000 [USP'U]/100ML
5-30 INJECTION, SOLUTION INTRAVENOUS CONTINUOUS
Status: DISCONTINUED | OUTPATIENT
Start: 2025-02-06 | End: 2025-02-07

## 2025-02-06 RX ORDER — SODIUM CHLORIDE 0.9 % (FLUSH) 0.9 %
10 SYRINGE (ML) INJECTION EVERY 12 HOURS SCHEDULED
Status: DISCONTINUED | OUTPATIENT
Start: 2025-02-06 | End: 2025-02-07 | Stop reason: HOSPADM

## 2025-02-06 RX ORDER — FERROUS SULFATE 325(65) MG
325 TABLET ORAL
Status: DISCONTINUED | OUTPATIENT
Start: 2025-02-06 | End: 2025-02-07 | Stop reason: HOSPADM

## 2025-02-06 RX ORDER — ALBUTEROL SULFATE 0.83 MG/ML
2.5 SOLUTION RESPIRATORY (INHALATION) EVERY 6 HOURS PRN
Status: DISCONTINUED | OUTPATIENT
Start: 2025-02-06 | End: 2025-02-07 | Stop reason: HOSPADM

## 2025-02-06 RX ORDER — SODIUM CHLORIDE 0.9 % (FLUSH) 0.9 %
5-40 SYRINGE (ML) INJECTION PRN
Status: DISCONTINUED | OUTPATIENT
Start: 2025-02-06 | End: 2025-02-07 | Stop reason: HOSPADM

## 2025-02-06 RX ORDER — LANOLIN ALCOHOL/MO/W.PET/CERES
100 CREAM (GRAM) TOPICAL DAILY
Status: DISCONTINUED | OUTPATIENT
Start: 2025-02-06 | End: 2025-02-07 | Stop reason: HOSPADM

## 2025-02-06 RX ORDER — MIRTAZAPINE 15 MG/1
15 TABLET, FILM COATED ORAL NIGHTLY
Status: DISCONTINUED | OUTPATIENT
Start: 2025-02-06 | End: 2025-02-07 | Stop reason: HOSPADM

## 2025-02-06 RX ORDER — ACETAMINOPHEN 325 MG/1
650 TABLET ORAL EVERY 6 HOURS PRN
Status: DISCONTINUED | OUTPATIENT
Start: 2025-02-06 | End: 2025-02-07 | Stop reason: HOSPADM

## 2025-02-06 RX ORDER — HYDROCODONE BITARTRATE AND ACETAMINOPHEN 7.5; 325 MG/1; MG/1
1 TABLET ORAL EVERY 12 HOURS PRN
Status: DISCONTINUED | OUTPATIENT
Start: 2025-02-06 | End: 2025-02-07

## 2025-02-06 RX ORDER — AMLODIPINE BESYLATE 5 MG/1
5 TABLET ORAL DAILY
Status: DISCONTINUED | OUTPATIENT
Start: 2025-02-06 | End: 2025-02-07 | Stop reason: HOSPADM

## 2025-02-06 RX ORDER — LORAZEPAM 2 MG/ML
3 INJECTION INTRAMUSCULAR
Status: DISCONTINUED | OUTPATIENT
Start: 2025-02-06 | End: 2025-02-07 | Stop reason: HOSPADM

## 2025-02-06 RX ORDER — LORAZEPAM 1 MG/1
4 TABLET ORAL
Status: DISCONTINUED | OUTPATIENT
Start: 2025-02-06 | End: 2025-02-07 | Stop reason: HOSPADM

## 2025-02-06 RX ORDER — IOPAMIDOL 755 MG/ML
75 INJECTION, SOLUTION INTRAVASCULAR
Status: DISCONTINUED | OUTPATIENT
Start: 2025-02-06 | End: 2025-02-07 | Stop reason: HOSPADM

## 2025-02-06 RX ORDER — POTASSIUM CHLORIDE 1500 MG/1
40 TABLET, EXTENDED RELEASE ORAL PRN
Status: DISCONTINUED | OUTPATIENT
Start: 2025-02-06 | End: 2025-02-07 | Stop reason: HOSPADM

## 2025-02-06 RX ORDER — MAGNESIUM SULFATE IN WATER 40 MG/ML
2000 INJECTION, SOLUTION INTRAVENOUS PRN
Status: DISCONTINUED | OUTPATIENT
Start: 2025-02-06 | End: 2025-02-07 | Stop reason: HOSPADM

## 2025-02-06 RX ORDER — ALBUTEROL SULFATE 90 UG/1
2 INHALANT RESPIRATORY (INHALATION) EVERY 6 HOURS PRN
COMMUNITY

## 2025-02-06 RX ORDER — LORAZEPAM 2 MG/ML
2 INJECTION INTRAMUSCULAR
Status: DISCONTINUED | OUTPATIENT
Start: 2025-02-06 | End: 2025-02-07 | Stop reason: HOSPADM

## 2025-02-06 RX ORDER — METOPROLOL TARTRATE 25 MG/1
25 TABLET, FILM COATED ORAL DAILY
COMMUNITY
End: 2025-02-06 | Stop reason: ALTCHOICE

## 2025-02-06 RX ORDER — LORAZEPAM 2 MG/ML
4 INJECTION INTRAMUSCULAR
Status: DISCONTINUED | OUTPATIENT
Start: 2025-02-06 | End: 2025-02-07 | Stop reason: HOSPADM

## 2025-02-06 RX ORDER — BENZONATATE 100 MG/1
200 CAPSULE ORAL DAILY
Status: DISCONTINUED | OUTPATIENT
Start: 2025-02-06 | End: 2025-02-07 | Stop reason: HOSPADM

## 2025-02-06 RX ORDER — PRAVASTATIN SODIUM 20 MG
20 TABLET ORAL DAILY
COMMUNITY

## 2025-02-06 RX ORDER — ASPIRIN 81 MG/1
324 TABLET, CHEWABLE ORAL ONCE
Status: COMPLETED | OUTPATIENT
Start: 2025-02-06 | End: 2025-02-06

## 2025-02-06 RX ORDER — ACETAMINOPHEN 325 MG/1
650 TABLET ORAL ONCE
Status: DISCONTINUED | OUTPATIENT
Start: 2025-02-06 | End: 2025-02-07 | Stop reason: HOSPADM

## 2025-02-06 RX ORDER — METOPROLOL SUCCINATE 25 MG/1
25 TABLET, EXTENDED RELEASE ORAL DAILY
Status: DISCONTINUED | OUTPATIENT
Start: 2025-02-06 | End: 2025-02-07 | Stop reason: HOSPADM

## 2025-02-06 RX ORDER — ASPIRIN 81 MG/1
81 TABLET, CHEWABLE ORAL DAILY
Status: DISCONTINUED | OUTPATIENT
Start: 2025-02-06 | End: 2025-02-07 | Stop reason: HOSPADM

## 2025-02-06 RX ORDER — ONDANSETRON 2 MG/ML
4 INJECTION INTRAMUSCULAR; INTRAVENOUS EVERY 6 HOURS PRN
Status: DISCONTINUED | OUTPATIENT
Start: 2025-02-06 | End: 2025-02-07 | Stop reason: HOSPADM

## 2025-02-06 RX ORDER — SODIUM CHLORIDE 9 MG/ML
INJECTION, SOLUTION INTRAVENOUS PRN
Status: DISCONTINUED | OUTPATIENT
Start: 2025-02-06 | End: 2025-02-07 | Stop reason: HOSPADM

## 2025-02-06 RX ORDER — TAMSULOSIN HYDROCHLORIDE 0.4 MG/1
0.4 CAPSULE ORAL DAILY
Status: DISCONTINUED | OUTPATIENT
Start: 2025-02-06 | End: 2025-02-07 | Stop reason: HOSPADM

## 2025-02-06 RX ORDER — LORAZEPAM 1 MG/1
1 TABLET ORAL
Status: DISCONTINUED | OUTPATIENT
Start: 2025-02-06 | End: 2025-02-07 | Stop reason: HOSPADM

## 2025-02-06 RX ORDER — LORAZEPAM 2 MG/ML
1 INJECTION INTRAMUSCULAR
Status: DISCONTINUED | OUTPATIENT
Start: 2025-02-06 | End: 2025-02-07 | Stop reason: HOSPADM

## 2025-02-06 RX ORDER — LORAZEPAM 1 MG/1
3 TABLET ORAL
Status: DISCONTINUED | OUTPATIENT
Start: 2025-02-06 | End: 2025-02-07 | Stop reason: HOSPADM

## 2025-02-06 RX ORDER — DIAZEPAM 5 MG/1
5 TABLET ORAL EVERY 8 HOURS PRN
Status: DISCONTINUED | OUTPATIENT
Start: 2025-02-06 | End: 2025-02-07 | Stop reason: HOSPADM

## 2025-02-06 RX ORDER — POTASSIUM CHLORIDE 7.45 MG/ML
10 INJECTION INTRAVENOUS PRN
Status: DISCONTINUED | OUTPATIENT
Start: 2025-02-06 | End: 2025-02-07 | Stop reason: HOSPADM

## 2025-02-06 RX ORDER — LORAZEPAM 1 MG/1
2 TABLET ORAL
Status: DISCONTINUED | OUTPATIENT
Start: 2025-02-06 | End: 2025-02-07 | Stop reason: HOSPADM

## 2025-02-06 RX ORDER — SODIUM CHLORIDE 0.9 % (FLUSH) 0.9 %
5-40 SYRINGE (ML) INJECTION EVERY 12 HOURS SCHEDULED
Status: DISCONTINUED | OUTPATIENT
Start: 2025-02-06 | End: 2025-02-07 | Stop reason: HOSPADM

## 2025-02-06 RX ORDER — ACETAMINOPHEN 650 MG/1
650 SUPPOSITORY RECTAL EVERY 6 HOURS PRN
Status: DISCONTINUED | OUTPATIENT
Start: 2025-02-06 | End: 2025-02-07 | Stop reason: HOSPADM

## 2025-02-06 RX ORDER — METOPROLOL SUCCINATE 25 MG/1
25 TABLET, EXTENDED RELEASE ORAL DAILY
COMMUNITY

## 2025-02-06 RX ORDER — SENNOSIDES A AND B 8.6 MG/1
1 TABLET, FILM COATED ORAL DAILY PRN
Status: DISCONTINUED | OUTPATIENT
Start: 2025-02-06 | End: 2025-02-07 | Stop reason: HOSPADM

## 2025-02-06 RX ORDER — SODIUM CHLORIDE 9 MG/ML
INJECTION, SOLUTION INTRAVENOUS CONTINUOUS
Status: DISCONTINUED | OUTPATIENT
Start: 2025-02-06 | End: 2025-02-07

## 2025-02-06 RX ORDER — ATORVASTATIN CALCIUM 40 MG/1
40 TABLET, FILM COATED ORAL NIGHTLY
Status: DISCONTINUED | OUTPATIENT
Start: 2025-02-06 | End: 2025-02-07 | Stop reason: HOSPADM

## 2025-02-06 RX ORDER — SODIUM CHLORIDE 0.9 % (FLUSH) 0.9 %
10 SYRINGE (ML) INJECTION PRN
Status: DISCONTINUED | OUTPATIENT
Start: 2025-02-06 | End: 2025-02-07 | Stop reason: HOSPADM

## 2025-02-06 RX ORDER — ONDANSETRON 4 MG/1
4 TABLET, ORALLY DISINTEGRATING ORAL EVERY 8 HOURS PRN
Status: DISCONTINUED | OUTPATIENT
Start: 2025-02-06 | End: 2025-02-07 | Stop reason: HOSPADM

## 2025-02-06 RX ORDER — HEPARIN SODIUM 1000 [USP'U]/ML
30 INJECTION, SOLUTION INTRAVENOUS; SUBCUTANEOUS PRN
Status: DISCONTINUED | OUTPATIENT
Start: 2025-02-06 | End: 2025-02-07

## 2025-02-06 RX ORDER — HEPARIN SODIUM 1000 [USP'U]/ML
60 INJECTION, SOLUTION INTRAVENOUS; SUBCUTANEOUS ONCE
Status: COMPLETED | OUTPATIENT
Start: 2025-02-06 | End: 2025-02-06

## 2025-02-06 RX ORDER — FOLIC ACID 1 MG/1
1 TABLET ORAL DAILY
Status: DISCONTINUED | OUTPATIENT
Start: 2025-02-06 | End: 2025-02-07 | Stop reason: HOSPADM

## 2025-02-06 RX ORDER — HEPARIN SODIUM 1000 [USP'U]/ML
60 INJECTION, SOLUTION INTRAVENOUS; SUBCUTANEOUS PRN
Status: DISCONTINUED | OUTPATIENT
Start: 2025-02-06 | End: 2025-02-07

## 2025-02-06 RX ADMIN — ACETAMINOPHEN 650 MG: 325 TABLET ORAL at 16:06

## 2025-02-06 RX ADMIN — SODIUM CHLORIDE: 9 INJECTION, SOLUTION INTRAVENOUS at 10:54

## 2025-02-06 RX ADMIN — ASPIRIN 81 MG: 81 TABLET, CHEWABLE ORAL at 09:30

## 2025-02-06 RX ADMIN — ATORVASTATIN CALCIUM 40 MG: 40 TABLET, FILM COATED ORAL at 20:14

## 2025-02-06 RX ADMIN — HYDROCODONE BITARTRATE AND ACETAMINOPHEN 1 TABLET: 7.5; 325 TABLET ORAL at 23:14

## 2025-02-06 RX ADMIN — MIRTAZAPINE 15 MG: 15 TABLET, FILM COATED ORAL at 20:13

## 2025-02-06 RX ADMIN — HYDROCODONE BITARTRATE AND ACETAMINOPHEN 1 TABLET: 7.5; 325 TABLET ORAL at 09:31

## 2025-02-06 RX ADMIN — SODIUM CHLORIDE 1000 ML: 9 INJECTION, SOLUTION INTRAVENOUS at 00:36

## 2025-02-06 RX ADMIN — TIZANIDINE 2 MG: 4 TABLET ORAL at 09:30

## 2025-02-06 RX ADMIN — HEPARIN SODIUM 3500 UNITS: 1000 INJECTION INTRAVENOUS; SUBCUTANEOUS at 10:55

## 2025-02-06 RX ADMIN — SODIUM CHLORIDE, PRESERVATIVE FREE 10 ML: 5 INJECTION INTRAVENOUS at 20:13

## 2025-02-06 RX ADMIN — FOLIC ACID 1 MG: 1 TABLET ORAL at 09:30

## 2025-02-06 RX ADMIN — TIZANIDINE 2 MG: 4 TABLET ORAL at 16:05

## 2025-02-06 RX ADMIN — HEPARIN SODIUM 3500 UNITS: 1000 INJECTION INTRAVENOUS; SUBCUTANEOUS at 03:19

## 2025-02-06 RX ADMIN — ASPIRIN 324 MG: 81 TABLET, CHEWABLE ORAL at 02:24

## 2025-02-06 RX ADMIN — METOPROLOL SUCCINATE 25 MG: 25 TABLET, EXTENDED RELEASE ORAL at 09:30

## 2025-02-06 RX ADMIN — HEPARIN SODIUM 12 UNITS/KG/HR: 10000 INJECTION, SOLUTION INTRAVENOUS at 03:20

## 2025-02-06 RX ADMIN — HEPARIN SODIUM 3500 UNITS: 1000 INJECTION INTRAVENOUS; SUBCUTANEOUS at 19:40

## 2025-02-06 RX ADMIN — DIAZEPAM 5 MG: 5 TABLET ORAL at 20:13

## 2025-02-06 RX ADMIN — Medication 100 MG: at 09:30

## 2025-02-06 RX ADMIN — TAMSULOSIN HYDROCHLORIDE 0.4 MG: 0.4 CAPSULE ORAL at 09:30

## 2025-02-06 RX ADMIN — TIZANIDINE 2 MG: 4 TABLET ORAL at 20:13

## 2025-02-06 RX ADMIN — AMLODIPINE BESYLATE 5 MG: 5 TABLET ORAL at 09:30

## 2025-02-06 ASSESSMENT — PAIN DESCRIPTION - DESCRIPTORS
DESCRIPTORS: CRUSHING;DISCOMFORT
DESCRIPTORS: ACHING;CRAMPING

## 2025-02-06 ASSESSMENT — PAIN SCALES - GENERAL
PAINLEVEL_OUTOF10: 8
PAINLEVEL_OUTOF10: 8
PAINLEVEL_OUTOF10: 5
PAINLEVEL_OUTOF10: 8
PAINLEVEL_OUTOF10: 8

## 2025-02-06 ASSESSMENT — PAIN DESCRIPTION - LOCATION
LOCATION: HIP;LEG;SHOULDER
LOCATION: HIP;LEG
LOCATION: HIP;LEG;SHOULDER
LOCATION: HIP

## 2025-02-06 ASSESSMENT — PAIN DESCRIPTION - ORIENTATION
ORIENTATION: LEFT
ORIENTATION: LEFT

## 2025-02-06 NOTE — CARE COORDINATION
Internal Medicine On-call Care Coordination Note    I was called by the ED physician because they recommended admission for this patient and we cover their PCP.  The history as I understand it after discussion with the ED physician is as follows:    Presents with dizziness and fall  EKG with ST and T wave changes, no criteria for STEMI  Up trending troponins  Started on heparin gtt (has not been on home eliquis)    I placed admission orders.  Including:    General admission orders  Reconciled home meds  Cardiology consult  Lipid panel  NPO     Dr. Uriarte, or our coverage will see the patient for H&P.    Electronically signed by ALLAN Oneil CNP on 2/6/2025 at 5:32 AM

## 2025-02-06 NOTE — ED NOTES
Attempted to stand patient to do orthos. Patient was too symptomatic. Patient was laid back down and fluids were started. Dr Fair aware

## 2025-02-06 NOTE — ED PROVIDER NOTES
[place] EMERGENCY DEPARTMENT  EMERGENCY DEPARTMENT ENCOUNTER        Pt Name: Mike Morrell Jr.  MRN: [id number]  Birthdate 1950  Date of evaluation: 2/5/2025  Provider: Anselmo Fair DO  PCP: Eric Jackson MD  Note Started: 3:51 AM EST 2/6/25    CHIEF COMPLAINT       Chief Complaint   Patient presents with    Dizziness     When standing      Fall     Multiple tonight, +thinner (eliquis), has not taken it since Sunday, has biopsy scheduled for tomorrow at 0600         HISTORY OF PRESENT ILLNESS: 1 or more Elements   History From: Patient/family    Limitations to history : None    Mike Morrell Jr. is a 74 y.o. male who presents to the Emergency Department for a fall.  The patient states that he was in the shower and went to dry his hair when he had sudden onset dizziness lightheadedness that caused him to feel off balance and caused him to fall.  He denies hitting his head.  No LOC.  The patient states he was unable to get up after the fall but after he dried off he eventually was able to crawl out and call his family member who brought him to the ED for further evaluation.  Patient is on Eliquis but has not taken it since Sunday as he is scheduled for a lung biopsy for a known lung mass.    Nursing Notes were all reviewed and agreed with or any disagreements were addressed in the HPI.      REVIEW OF EXTERNAL NOTE :       PDMP Monitoring:    Last PDMP Thomas as Reviewed:  Review User Review Instant Review Result            Urine Drug Screenings (1 yr)       Urine Drug Screen  Collected: 4/25/2021  2:31 AM (Final result)              Serum Drug Screen  Collected: 4/25/2021  2:31 AM (Final result)                  Medication Contract and Consent for Opioid Use Documents Filed        No documents found                      REVIEW OF SYSTEMS :           Positives and Pertinent negatives as per HPI.     SURGICAL HISTORY     Past Surgical History:   Procedure Laterality

## 2025-02-06 NOTE — CONSULTS
[facility]    INPATIENT CARDIOLOGY CONSULT    Name: Mike Morrell Jr.    Age: 74 y.o.    Date of Admission: 2/5/2025 11:07 PM    Date of Service: 2/6/2025    Reason for Consultation: Elevated troponin    Referring Physician: Dr. Uriarte   Primary Care Physician: Eric Jackson MD    History of Present Illness: The patient is a 74 y.o. year old male presenting to the ED yesterday after a fall in the bathtub.  Denies syncope, states he just slipped but fell again 3 more times trying to get out of the bathtub.  States he also fell 2 days before in the living room.  Again just due to weakness and not syncope.  Never had any chest pain, never had any shortness of breath or palpitations.  States he gets dizzy if he was trying to dry his head with a towel but otherwise okay.  Only complaints are musculoskeletal pains from the fall.  Not clear why but the patient had cardiac enzymes drawn and of course were elevated at 345 and 421, CPK was also elevated 975.  Patient is laying in bed quite comfortable in no distress.  He states he was due for a lung biopsy today for lung mass.    Past history is significant for coronary artery disease bypass surgery in 2010 consisting of LIMA to LAD, vein graft to circumflex, vein graft RCA.  Had a pacemaker in 2006 which was explanted in 2010 for infection.  Had a Lexiscan SPECT study 2/22/2024 which showed EF of 64% no ischemia or scar.  Echocardiogram 12/4/2024 showed EF of 55 to 60%, aortic sclerosis, mild mitral moderate tricuspid insufficiency, RVSP 63 mmHg, mildly reduced RV function, mild left atrial enlargement.  Having recurrent hyponatremic episodes.  Undergoing workup for lung mass.    Past Medical History:   Past Medical History:   Diagnosis Date    Cancer (HCC)     Diarrhea     Hyperlipidemia     Hypertension     Lung mass     PONV (postoperative nausea and vomiting)     Urinary frequency        Review of Systems:   Constitutional: No fever, chills, 
2/6/25    IMPRESSION:  1. No evidence of pulmonary embolism or acute thoracic injury.  2. Mildly spiculated 4.8 cm mass superior lingula, similar in size to prior  examination of 11/26/2024.  3. Increasing nodular infiltrate posterior right upper lobe measuring up to  2.9 cm, previously 9 mm on 11/26/2024.    IMPRESSION/RECOMMENDATIONS:      Briefly Mr. Morrell is a 74-year-old  man with history of heavy alcohol abuse, HTN, CAD status post CABG, sick sinus syndrome status post dual-chamber pacemaker placement in 2006 for symptomatic bradycardia status post extraction due to infection, hyperlipidemia, severe diverticulosis, recent left hip fracture and lung cancer who was admitted on 2/5/2025 for a fall.  Lab work revealed sodium 132 and had dropped to 127, reason for this consultation.  He admits to drinking a large amount of beer throughout the day with barely any food intake.    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  Urine osmolality 369, urine sodium 71, fluid restriction dry tray     Hyperkalemia, 2/2 ACE inhibition, Lokelma given  HTN, on amlodipine and metoprolol  Macrocytic anemia, ferritin 344, iron 31, iron saturation 15%, folate 5.6, vitamin B12 1151  -------------------------------------------  Left upper lobe lung mass, likely bronchogenic carcinoma  History of CAD status post CABG  Alcohol abuse  History of left hip fracture  NSTEMI, cardiology following     Plan:     Continue NS at 75 mL/h  Fluid restriction, dry tray  Monitor sodium level  Monitor blood pressure    Thank you Dr. Uriarte for allowing us to participate in the care of Mike Morrell Jr..      Electronically signed by ALLAN Lopes - CNP on 2/6/2025 at 8:10 PM     I saw and evaluated the patient, performing the key elements of the service. I discussed the findings, assessment and plan with NP and agree with her findings and plans as documented in 
06:34 AM    CO2 22 02/06/2025 06:34 AM    BUN 18 02/06/2025 06:34 AM    CREATININE 1.0 02/06/2025 06:34 AM    CALCIUM 8.7 02/06/2025 06:34 AM    GFRAA > 60 12/16/2024 07:05 AM    LABGLOM 84 02/06/2025 06:34 AM     Lab Results   Component Value Date/Time    PROTIME 10.2 11/27/2024 07:30 AM    INR 0.9 11/27/2024 07:30 AM     No results for input(s): \"PROBNP\" in the last 72 hours.  No results for input(s): \"TROPONINI\" in the last 72 hours.  No results for input(s): \"PROCAL\" in the last 72 hours.  This SmartLink has not been configured with any valid records.       Micro:  No results for input(s): \"CULTRESP\" in the last 72 hours.  No results for input(s): \"LABGRAM\" in the last 72 hours.  No results for input(s): \"LEGUR\" in the last 72 hours.  No results for input(s): \"STREPNEUMAGU\" in the last 72 hours.  No results for input(s): \"LP1UAG\" in the last 72 hours.  Nov 2024    Path Number: [id number]    -- Diagnosis --  A.          Lung Biopsy, Needle Cores. Left:         Limited fibrotic lung tissue with fibroelastosis, pigmented  histiocytes, lymphoplasmacytic inflammation, and scant atypical  epithelial cells. (See comment)    Comment:  Multiple deeper levels were evaluated. The scant atypical respiratory  epithelial cells present in the sample are insufficient for the  diagnosis of malignancy. The biopsy material may not be representative  of the mass lesion seen on chest imaging findings. Follow-up and  further evaluation recommended as clinically indicated.     Assessment:  Left lung mass with hilar adenopathy  Right lung pulmonary nodule  Advanced COPD  Centrilobular emphysema  Recurrent falls  Nicotine dependence 59 pack years  Marijuana use  Hyponatremia  CAD with history of CABG  History of PAF  Prior pneumothorax after ct guided biopsy left lung mass from Nov 2024 required chest tube for re expansion  Pulmonary hypertension on prior echo December 2024  Elevated troponin  Rhabdomyolysis  History of small lacunar

## 2025-02-07 VITALS
TEMPERATURE: 98.8 F | OXYGEN SATURATION: 97 % | SYSTOLIC BLOOD PRESSURE: 106 MMHG | RESPIRATION RATE: 18 BRPM | BODY MASS INDEX: 19.25 KG/M2 | HEIGHT: 68 IN | DIASTOLIC BLOOD PRESSURE: 63 MMHG | WEIGHT: 127 LBS | HEART RATE: 66 BPM

## 2025-02-07 LAB
ALBUMIN SERPL-MCNC: 2.9 G/DL (ref 3.5–5.2)
ALP SERPL-CCNC: 129 U/L (ref 40–129)
ALT SERPL-CCNC: 11 U/L (ref 0–40)
ANION GAP SERPL CALCULATED.3IONS-SCNC: 9 MMOL/L (ref 7–16)
AST SERPL-CCNC: 31 U/L (ref 0–39)
BASOPHILS # BLD: 0.1 K/UL (ref 0–0.2)
BASOPHILS NFR BLD: 2 % (ref 0–2)
BILIRUB SERPL-MCNC: <0.2 MG/DL (ref 0–1.2)
BUN SERPL-MCNC: 16 MG/DL (ref 6–23)
CALCIUM SERPL-MCNC: 8.2 MG/DL (ref 8.6–10.2)
CHLORIDE SERPL-SCNC: 101 MMOL/L (ref 98–107)
CHOLEST SERPL-MCNC: 141 MG/DL
CK SERPL-CCNC: 419 U/L (ref 20–200)
CO2 SERPL-SCNC: 24 MMOL/L (ref 22–29)
CREAT SERPL-MCNC: 1 MG/DL (ref 0.7–1.2)
EOSINOPHIL # BLD: 0.31 K/UL (ref 0.05–0.5)
EOSINOPHILS RELATIVE PERCENT: 5 % (ref 0–6)
ERYTHROCYTE [DISTWIDTH] IN BLOOD BY AUTOMATED COUNT: 15.7 % (ref 11.5–15)
GFR, ESTIMATED: 82 ML/MIN/1.73M2
GLUCOSE SERPL-MCNC: 80 MG/DL (ref 74–99)
HCT VFR BLD AUTO: 32 % (ref 37–54)
HDLC SERPL-MCNC: 77 MG/DL
HGB BLD-MCNC: 10 G/DL (ref 12.5–16.5)
IMM GRANULOCYTES # BLD AUTO: <0.03 K/UL (ref 0–0.58)
IMM GRANULOCYTES NFR BLD: 0 % (ref 0–5)
LDLC SERPL CALC-MCNC: 49 MG/DL
LYMPHOCYTES NFR BLD: 1.65 K/UL (ref 1.5–4)
LYMPHOCYTES RELATIVE PERCENT: 27 % (ref 20–42)
MCH RBC QN AUTO: 29.9 PG (ref 26–35)
MCHC RBC AUTO-ENTMCNC: 31.3 G/DL (ref 32–34.5)
MCV RBC AUTO: 95.5 FL (ref 80–99.9)
MONOCYTES NFR BLD: 0.65 K/UL (ref 0.1–0.95)
MONOCYTES NFR BLD: 11 % (ref 2–12)
NEUTROPHILS NFR BLD: 56 % (ref 43–80)
NEUTS SEG NFR BLD: 3.41 K/UL (ref 1.8–7.3)
PARTIAL THROMBOPLASTIN TIME: 48.8 SEC (ref 24.5–35.1)
PLATELET # BLD AUTO: 326 K/UL (ref 130–450)
PMV BLD AUTO: 9.8 FL (ref 7–12)
POTASSIUM SERPL-SCNC: 4.2 MMOL/L (ref 3.5–5)
PROT SERPL-MCNC: 5.7 G/DL (ref 6.4–8.3)
RBC # BLD AUTO: 3.35 M/UL (ref 3.8–5.8)
SODIUM SERPL-SCNC: 134 MMOL/L (ref 132–146)
TRIGL SERPL-MCNC: 73 MG/DL
VLDLC SERPL CALC-MCNC: 15 MG/DL
WBC OTHER # BLD: 6.1 K/UL (ref 4.5–11.5)

## 2025-02-07 PROCEDURE — 6370000000 HC RX 637 (ALT 250 FOR IP): Performed by: INTERNAL MEDICINE

## 2025-02-07 PROCEDURE — 85025 COMPLETE CBC W/AUTO DIFF WBC: CPT

## 2025-02-07 PROCEDURE — 2500000003 HC RX 250 WO HCPCS: Performed by: INTERNAL MEDICINE

## 2025-02-07 PROCEDURE — 85730 THROMBOPLASTIN TIME PARTIAL: CPT

## 2025-02-07 PROCEDURE — 6360000002 HC RX W HCPCS: Performed by: EMERGENCY MEDICINE

## 2025-02-07 PROCEDURE — 80053 COMPREHEN METABOLIC PANEL: CPT

## 2025-02-07 PROCEDURE — 6370000000 HC RX 637 (ALT 250 FOR IP): Performed by: NURSE PRACTITIONER

## 2025-02-07 PROCEDURE — 80061 LIPID PANEL: CPT

## 2025-02-07 PROCEDURE — 82550 ASSAY OF CK (CPK): CPT

## 2025-02-07 RX ORDER — LANOLIN ALCOHOL/MO/W.PET/CERES
100 CREAM (GRAM) TOPICAL DAILY
Qty: 30 TABLET | Refills: 0 | Status: SHIPPED | OUTPATIENT
Start: 2025-02-08

## 2025-02-07 RX ORDER — HYDROCODONE BITARTRATE AND ACETAMINOPHEN 7.5; 325 MG/1; MG/1
1 TABLET ORAL EVERY 8 HOURS PRN
Status: DISCONTINUED | OUTPATIENT
Start: 2025-02-07 | End: 2025-02-07 | Stop reason: HOSPADM

## 2025-02-07 RX ADMIN — AMLODIPINE BESYLATE 5 MG: 5 TABLET ORAL at 08:57

## 2025-02-07 RX ADMIN — HEPARIN SODIUM 1700 UNITS: 1000 INJECTION INTRAVENOUS; SUBCUTANEOUS at 03:32

## 2025-02-07 RX ADMIN — DIAZEPAM 5 MG: 5 TABLET ORAL at 08:57

## 2025-02-07 RX ADMIN — ASPIRIN 81 MG: 81 TABLET, CHEWABLE ORAL at 08:57

## 2025-02-07 RX ADMIN — HYDROCODONE BITARTRATE AND ACETAMINOPHEN 1 TABLET: 7.5; 325 TABLET ORAL at 08:57

## 2025-02-07 RX ADMIN — TIZANIDINE 2 MG: 4 TABLET ORAL at 09:00

## 2025-02-07 RX ADMIN — TAMSULOSIN HYDROCHLORIDE 0.4 MG: 0.4 CAPSULE ORAL at 08:57

## 2025-02-07 RX ADMIN — Medication 100 MG: at 08:57

## 2025-02-07 RX ADMIN — SODIUM CHLORIDE, PRESERVATIVE FREE 10 ML: 5 INJECTION INTRAVENOUS at 08:58

## 2025-02-07 RX ADMIN — FOLIC ACID 1 MG: 1 TABLET ORAL at 08:57

## 2025-02-07 RX ADMIN — METOPROLOL SUCCINATE 25 MG: 25 TABLET, EXTENDED RELEASE ORAL at 08:57

## 2025-02-07 RX ADMIN — HEPARIN SODIUM 22 UNITS/KG/HR: 10000 INJECTION, SOLUTION INTRAVENOUS at 05:52

## 2025-02-07 ASSESSMENT — PAIN DESCRIPTION - ORIENTATION: ORIENTATION: LEFT

## 2025-02-07 ASSESSMENT — PAIN DESCRIPTION - LOCATION: LOCATION: HIP;LEG;SHOULDER

## 2025-02-07 ASSESSMENT — PAIN SCALES - GENERAL: PAINLEVEL_OUTOF10: 8

## 2025-02-07 NOTE — PROGRESS NOTES
Nico Lam M.D.,[facility]  Yoav Garduno D.O., CHRISTA., [facility]  Kimberlyn Mcmahan M.D.  Ashia Guevara M.D.   Abilio Dukes D.O.  Cameron Michelle M.D.         Daily Pulmonary Progress Note    Patient:  Mike Morrell Jr. 74 y.o. male MRN: [id number]            Synopsis     We are following patient for lung mass, need for biopsy    \"CC\" dizziness and fall    Code status: Full      Subjective      Patient was seen and examined.  Feeling much better today.  Questions answered regarding rescheduling EBUS bronchoscopy for transbronchial biopsy of lung mass.  Patient agreeable to plan.  Will need rescheduled at [facility] upon discharge.  He is feeling better today and would like to go home      Review of Systems:  Constitutional: Denies fever, weight loss, night sweats, and fatigue weakness and fatigue  Skin: Denies pigmentation, dark lesions, and rashes   HEENT: Denies hearing loss, tinnitus, ear drainage, epistaxis, sore throat, and hoarseness.  Cardiovascular: Denies palpitations, chest pain, and chest pressure.  Respiratory: Denies cough, dyspnea at rest, hemoptysis, apnea, and choking.  Gastrointestinal: Denies nausea, vomiting, poor appetite, diarrhea, heartburn or reflux  Genitourinary: Denies dysuria, frequency, urgency or hematuria  Musculoskeletal: Denies myalgias, muscle weakness, and bone pain fall in the bath tub at home, recurrent fall  Neurological:  dizziness  Psychological: Denies anxiety and depression  Endocrine: Denies heat intolerance and cold intolerance  Hematopoietic/Lymphatic: Denies bleeding problems and blood transfusions    24-hour events:  None    Objective   OBJECTIVE:   /63   Pulse 66   Temp 98.8 °F (37.1 °C) (Oral)   Resp 18   Ht 1.727 m (5' 8\")   Wt 57.6 kg (127 lb)   SpO2 97%   BMI 19.31 kg/m²   SpO2 Readings from Last 1 Encounters:   02/07/25 97%        I/O:    Intake/Output Summary (Last 24 hours) at 2/7/2025 6601  Last data filed at 2/7/2025 
  Physician Progress Note      PATIENT:               EDMUND BERRY  CSN #:                  [id number]  :                       1950  ADMIT DATE:       2025 11:07 PM  DISCH DATE:        2025 1:49 PM  RESPONDING  PROVIDER #:        Mike Aguilar MD          QUERY TEXT:    Dear Cardiologist,    Pt admitted with falls, hyponatremia, traumatic rhabdomyolysis. Pt noted to   have Troponin 345 and 421.   If possible, please document in the progress   notes and discharge summary if you are evaluating and/or treating any of the   following:    The medical record reflects the following:  Risk Factors: coronary artery disease previous bypass surgery , normal   stress test 1 year ago.  Normal LV function on echo 2024.  No angina  Clinical Indicators: Per PCP ,\"... Suspect type II release given falls...we   do not think that he actually had a myocardial infarction-heparin infusion   was stopped...\"  Per Cardio ,\"...cardiac enzymes ...elevated at 345 and   421, CPK was also elevated 975...Elevated troponin without chest pain,   following multiple falls, and a lung mass.  I do not believe EKG is remarkable   for ischemia.  I believe this is a type II release... Currently on statin,   Toprol, low strength aspirin....\"  Treatment: ASA, IV Heparin, Toprol and Pravachol    Thank you,  Nicky Burnett RN [facility]  Clinical Documentation Improvement Specialist  Phone# [phone]  Options provided:  -- Type 2 MI  -- Other - I will add my own diagnosis  -- Disagree - Not applicable / Not valid  -- Disagree - Clinically unable to determine / Unknown  -- Refer to Clinical Documentation Reviewer    PROVIDER RESPONSE TEXT:    This patient has a Type 2 MI.    Query created by: Nicky Burnett on 2025 2:11 PM      Electronically signed by:  Mike Aguilar MD 2025 2:16 PM          
4 Eyes Skin Assessment     NAME:  Mike Morrell Jr.  YOB: 1950  MEDICAL RECORD NUMBER:  [id number]    The patient is being assessed for  Admission    I agree that at least one RN has performed a thorough Head to Toe Skin Assessment on the patient. ALL assessment sites listed below have been assessed.      Areas assessed by both nurses:    Head, Face, Ears, Shoulders, Back, Chest, Arms, Elbows, Hands, Sacrum. Buttock, Coccyx, Ischium, Legs. Feet and Heels, and Under Medical Devices         Does the Patient have a Wound? Yes wound(s) were present on assessment. LDA wound assessment was Initiated and completed by RN       Blue Prevention initiated by RN: Yes  Wound Care Orders initiated by RN: Yes    Pressure Injury (Stage 3,4, Unstageable, DTI, NWPT, and Complex wounds) if present, place Wound referral order by RN under : No    New Ostomies, if present place, Ostomy referral order under : No     Nurse 1 eSignature: Electronically signed by Jenny Lundberg RN on 2/6/25 at 7:51 PM EST    **SHARE this note so that the co-signing nurse can place an eSignature**    Nurse 2 eSignature: Electronically signed by Todd Lyman RN on 2/7/25 at 3:21 AM EST   
Database initiated. Patient is A&O comes in from home alone. States he uses a walker and is RA at baseline. He is here for a fall. States he goes to out patient PT/OT after his left hip surgery in November.     
Internal Medicine Progress Note    Patient's name: Mike Morrell Jr.  : 1950  Chief complaints (on day of admission): Dizziness (When standing/) and Fall (Multiple tonight, +thinner (eliquis), has not taken it since , has biopsy scheduled for tomorrow at 0600/)  Admission date: 2025  Date of service: 2025   Room: [address] MED SURG  Primary care physician: Eric Jackson MD  Reason for visit: Follow-up for recurrent falls, rhabdomyolysis    Subjective  Mike is seen lying in bed awake and alert, in no distress.  He is very pleasant this morning.  Reports that he feels well.  He does report occasional cough however denies any shortness of breath or difficulty breathing.  Denies any nausea or vomiting.  Denies any fever or chills.  He is asking about when he can go home.  No other issues or concerns from nursing.    Review of Systems  Full 10 point review of systems negative unless mentioned above.    Hospital Medications  Current Facility-Administered Medications   Medication Dose Route Frequency Provider Last Rate Last Admin    iopamidol (ISOVUE-370) 76 % injection 75 mL  75 mL IntraVENous ONCE PRN Iftikhar Eric MD        acetaminophen (TYLENOL) tablet 650 mg  650 mg Oral Once Anselmo Fair,         heparin (porcine) injection 3,500 Units  60 Units/kg IntraVENous PRN Anselmo Fair DO   3,500 Units at 25 1940    heparin (porcine) injection 1,700 Units  30 Units/kg IntraVENous PRN Anselmo Fair DO   1,700 Units at 25 0332    heparin 25,000 units in dextrose 5% 250 mL (premix) infusion  5-30 Units/kg/hr IntraVENous Continuous Anselmo Fair DO 12.7 mL/hr at 25 0552 22 Units/kg/hr at 25 0552    aspirin chewable tablet 81 mg  81 mg Oral Daily Julieth Lopez APRN - CNP   81 mg at 25 0930    albuterol (PROVENTIL) (2.5 MG/3ML) 0.083% nebulizer solution 2.5 mg  2.5 mg Nebulization Q6H PRN Julieth Lopez APRN - CNP        
Ok to discharge per Dr. Dukes, Dr. Aguilar and Nephrology  
Patient states he has been asking for pain medications since midnight, primary RN made aware. He also mentioned eating I stated he needs to wait until cardiology rounds on him this morning.   
Spiritual Health History and Assessment/Progress Note  [facility]    Spiritual/Emotional Needs,  ,  ,      Name: Mike Morrell Jr. MRN: [id number]    Age: 74 y.o.     Sex: male   Language: English   Hoahaoism: Pentecostalism   NSTEMI (non-ST elevated myocardial infarction) (HCC)     Date: 2/6/2025                           Spiritual Assessment began in [facility] EMERGENCY DEPARTMENT        Referral/Consult From: Nurse   Encounter Overview/Reason: Spiritual/Emotional Needs  Service Provided For: Patient    Evie, Belief, Meaning:   Patient has beliefs or practices that help with coping during difficult times  Family/Friends No family/friends present      Importance and Influence:  Patient has no beliefs influential to healthcare decision-making identified during this visit  Family/Friends No family/friends present    Community:  Patient is connected with a spiritual community  Family/Friends No family/friends present    Assessment and Plan of Care:     Patient Interventions include: Facilitated expression of thoughts and feelings and Other: Referred to  for Pentecostalism Communion.  Family/Friends Interventions include: No family/friends present    Patient Plan of Care: Spiritual Care available upon further referral  Family/Friends Plan of Care: No family/friends present    Electronically signed by Chaplain Kimmy on 2/6/2025 at 3:41 PM    
Spiritual Health History and Assessment/Progress Note  [facility]     Encounter, Rituals, Rites and Sacraments,  ,  ,      Name: Mike Morrell Jr. MRN: [id number]    Age: 74 y.o.     Sex: male   Language: English   Pentecostal: Sikh   NSTEMI (non-ST elevated myocardial infarction) (HCC)     Date: 2/6/2025                           Spiritual Assessment began in [address] MED SURG        Referral/Consult From: Rounding   Encounter Overview/Reason:  Encounter, Rituals, Rites and Sacraments  Service Provided For: Patient    Evie, Belief, Meaning:   Patient is connected with a evie tradition or spiritual practice  Family/Friends No family/friends present      Importance and Influence:  Patient has no beliefs influential to healthcare decision-making identified during this visit  Family/Friends No family/friends present    Community:  Patient feels well-supported. Support system includes: Children  Family/Friends No family/friends present    Assessment and Plan of Care:     Patient Interventions include: Affirmed coping skills/support systems and Provided sacramental/Congregational ritual  Family/Friends Interventions include: No family/friends present    Patient Plan of Care: Spiritual Care available upon further referral  Family/Friends Plan of Care: Spiritual Care available upon further referral    Electronically signed by Chaplain Kennedy on 2/6/2025 at 6:51 PM   
Lab work revealed sodium 132 and had dropped to 127, reason for this consultation.  He admits to drinking a large amount of beer throughout the day with barely any food intake.    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  Urine osmolality 369, urine sodium 71, sodium improved to 134, discontinue IV fluids     Hyperkalemia, 2/2 ACE inhibition, potassium 4.2, resolved  HTN, on amlodipine and metoprolol  Macrocytic anemia, ferritin 344, iron 31, iron saturation 15%, folate 5.6, vitamin B12 1151  -------------------------------------------  Left upper lobe lung mass, likely bronchogenic carcinoma  History of CAD status post CABG  Alcohol abuse  History of left hip fracture  NSTEMI, cardiology following     Plan:     Discontinue IV fluids  Fluid restriction, dry tray  Continue to monitor sodium level  Continue to monitor blood pressure  Okay to discharge from renal point of view      Electronically signed by ALLAN Lopes CNP on 2/7/2025 at 9:04 AM     I saw and evaluated the patient, performing the key elements of the service. I discussed the findings, assessment and plan with NP and agree with her findings and plans as documented in her note.      
[id number] score 3 so placed on OAC.  However he had been admitted for a fall then.  Has taken multiple falls and may be very high risk for resuming Eliquis.  Currently on hold 4 days for this biopsy.    He does not appear unstable.  Believe he can go home.      Mike Aguilar MD, [facility]  [facility]    Electronically signed by Mike Aguilar MD on 2/7/2025 at 10:24 AM

## 2025-02-07 NOTE — ACP (ADVANCE CARE PLANNING)
Advance Care Planning   Healthcare Decision Maker:    Primary Decision Maker: VANNA MESSER - [facility] - [phone]    Click here to complete Healthcare Decision Makers including selection of the Healthcare Decision Maker Relationship (ie \"Primary\").  Today we documented Decision Maker(s) consistent with Legal Next of Kin hierarchy.

## 2025-02-07 NOTE — DISCHARGE SUMMARY
guidelines do not apply to immunocompromised patients and patients with cancer. Follow up in patients with significant comorbidities as clinically warranted. For lung cancer screening, adhere to Lung-RADS guidelines. Reference: Radiology. 2017; 284(1):228-77.     CT CSpine W/O Contrast    Result Date: 2/6/2025  EXAMINATION: CT OF THE CERVICAL SPINE WITHOUT CONTRAST 2/6/2025 12:51 am TECHNIQUE: CT of the cervical spine was performed without the administration of intravenous contrast. Multiplanar reformatted images are provided for review. Automated exposure control, iterative reconstruction, and/or weight based adjustment of the mA/kV was utilized to reduce the radiation dose to as low as reasonably achievable. COMPARISON: None. HISTORY: ORDERING SYSTEM PROVIDED HISTORY: fall TECHNOLOGIST PROVIDED HISTORY: Reason for exam:->fall Decision Support Exception - unselect if not a suspected or confirmed emergency medical condition->Emergency Medical Condition (MA) FINDINGS: BONES/ALIGNMENT: There is no acute fracture or traumatic malalignment. DEGENERATIVE CHANGES: Mild diffuse cervical spondylosis.  No significant osseous spinal canal stenosis. SOFT TISSUES: There is no prevertebral soft tissue swelling.     No acute abnormality of the cervical spine.     CT Head W/O Contrast    Result Date: 2/6/2025  EXAMINATION: CT OF THE HEAD WITHOUT CONTRAST  2/6/2025 12:51 am TECHNIQUE: CT of the head was performed without the administration of intravenous contrast. Automated exposure control, iterative reconstruction, and/or weight based adjustment of the mA/kV was utilized to reduce the radiation dose to as low as reasonably achievable. COMPARISON: None. HISTORY: ORDERING SYSTEM PROVIDED HISTORY: fall TECHNOLOGIST PROVIDED HISTORY: Has a \"code stroke\" or \"stroke alert\" been called?->No Reason for exam:->fall Decision Support Exception - unselect if not a suspected or confirmed emergency medical condition->Emergency Medical

## 2025-02-07 NOTE — PLAN OF CARE
Problem: ABCDS Injury Assessment  Goal: Absence of physical injury  Outcome: Progressing     Problem: Discharge Planning  Goal: Discharge to home or other facility with appropriate resources  Outcome: Progressing     Problem: Chronic Conditions and Co-morbidities  Goal: Patient's chronic conditions and co-morbidity symptoms are monitored and maintained or improved  Outcome: Progressing     Problem: Pain  Goal: Verbalizes/displays adequate comfort level or baseline comfort level  Outcome: Progressing  Flowsheets (Taken 2/6/2025 1545 by Jenny Lundberg RN)  Verbalizes/displays adequate comfort level or baseline comfort level: Encourage patient to monitor pain and request assistance

## 2025-02-07 NOTE — CARE COORDINATION
Introduced my self and provided explanation of CM role to patient. Admitted for multiple falls, NSTEMI, hyponatremia

## 2025-02-13 RX ORDER — ATORVASTATIN CALCIUM 40 MG/1
40 TABLET, FILM COATED ORAL DAILY
COMMUNITY

## 2025-02-13 RX ORDER — LISINOPRIL 2.5 MG/1
200 TABLET ORAL DAILY
COMMUNITY

## 2025-02-13 NOTE — PROGRESS NOTES
[facility]   PRE-ADMISSION TESTING GENERAL INSTRUCTIONS  PAT Phone Number: [phone]      GENERAL INSTRUCTIONS:    [x] Antibacterial Soap Shower Night before AND the Morning of procedure.  [] The Night Before Surgery: Take an antibacterial soap shower - followed by CHG Wipes.   -The Morning of Surgery: Repeat CHG Wipes.  [x] Do not wear makeup, lotions, powders, deodorant the morning of surgery.  [x] No solid food after midnight. You may have SIPS of clear liquids up until 2 hours before your arrival time to the hospital.   [x] You may brush your teeth, gargle, but do not swallow water.   [x] No tobacco products, illegal drugs, or alcohol within 24 hours of your surgery.  [x] Jewelry or valuables should not be brought to the hospital. All body and/or tongue piercing's must be removed prior to arriving to hospital. No contact lens or hair pins.   [x] Arrange transportation with a responsible adult  to and from the hospital. Arrange for someone to be with you for the remainder of the day and for 24 hours after your procedure due to having had anesthesia.          -Who will be your  for transportation? daughter        -Who will be staying with you for 24 hrs after your procedure? daughter  [x] Bring insurance card and photo ID.  [] Bring copy of living will or healthcare power of  papers to be placed in your electronic record.  [] Urine Pregnancy test will be preformed the day of surgery. A specimen sample may be brought from home.  [] Transfusion (Green) Bracelet: Please bring with you to hospital, day of surgery.     PARKING INSTRUCTIONS:     [x] ARRIVAL DATE & TIME: 2/19 @ 1000  [x] Times are subject to change. We will contact you the business day before surgery if that were to occur.  [x] Enter into the [facility] Entrance. Two people may accompany you. Masks are not required.  [x] Parking Lot \"I\" is where you will park. It is located on the corner of [address]  the time you are needed in the pre-op area to prepare you for surgery. Please do not be discouraged if you are not greeted in the order you arrive as there are many variables that are involved in patient preparation. Your patience is greatly appreciated as you wait for your nurse.   [x] Delays may occur with surgery and staff will make a sincere effort to keep you informed of delays. If any delays occur with your procedure, we apologize ahead of time for your inconvenience as we recognize the value of your time.

## 2025-02-17 ENCOUNTER — ANESTHESIA EVENT (OUTPATIENT)
Dept: ENDOSCOPY | Age: 75
End: 2025-02-17
Payer: MEDICARE

## 2025-02-18 ENCOUNTER — HOSPITAL ENCOUNTER (OUTPATIENT)
Dept: CT IMAGING | Age: 75
Discharge: HOME | End: 2025-02-20
Attending: INTERNAL MEDICINE
Payer: MEDICARE

## 2025-02-18 DIAGNOSIS — R91.8 MASS OF LEFT LUNG: ICD-10-CM

## 2025-02-18 PROCEDURE — 71250 CT THORAX DX C-: CPT

## 2025-02-19 ENCOUNTER — APPOINTMENT (OUTPATIENT)
Dept: GENERAL RADIOLOGY | Age: 75
End: 2025-02-19
Attending: INTERNAL MEDICINE
Payer: MEDICARE

## 2025-02-19 ENCOUNTER — HOSPITAL ENCOUNTER (OUTPATIENT)
Age: 75
Setting detail: OUTPATIENT SURGERY
Discharge: HOME | End: 2025-02-19
Attending: INTERNAL MEDICINE | Admitting: INTERNAL MEDICINE
Payer: MEDICARE

## 2025-02-19 ENCOUNTER — ANESTHESIA (OUTPATIENT)
Dept: ENDOSCOPY | Age: 75
End: 2025-02-19
Payer: MEDICARE

## 2025-02-19 VITALS
SYSTOLIC BLOOD PRESSURE: 154 MMHG | WEIGHT: 128 LBS | HEIGHT: 68 IN | DIASTOLIC BLOOD PRESSURE: 79 MMHG | BODY MASS INDEX: 19.4 KG/M2 | HEART RATE: 77 BPM | RESPIRATION RATE: 20 BRPM | OXYGEN SATURATION: 94 % | TEMPERATURE: 97.7 F

## 2025-02-19 DIAGNOSIS — Z01.812 PRE-OPERATIVE LABORATORY EXAMINATION: Primary | ICD-10-CM

## 2025-02-19 DIAGNOSIS — R91.8 MASS OF LEFT LUNG: ICD-10-CM

## 2025-02-19 LAB
ALBUMIN SERPL-MCNC: 3.6 G/DL (ref 3.5–5.2)
ALP SERPL-CCNC: 129 U/L (ref 40–129)
ALT SERPL-CCNC: 10 U/L (ref 0–40)
ANION GAP SERPL CALCULATED.3IONS-SCNC: 10 MMOL/L (ref 7–16)
AST SERPL-CCNC: 14 U/L (ref 0–39)
BILIRUB SERPL-MCNC: <0.2 MG/DL (ref 0–1.2)
BUN SERPL-MCNC: 9 MG/DL (ref 6–23)
CALCIUM SERPL-MCNC: 8.5 MG/DL (ref 8.6–10.2)
CHLORIDE SERPL-SCNC: 102 MMOL/L (ref 98–107)
CO2 SERPL-SCNC: 24 MMOL/L (ref 22–29)
CREAT SERPL-MCNC: 1 MG/DL (ref 0.7–1.2)
ERYTHROCYTE [DISTWIDTH] IN BLOOD BY AUTOMATED COUNT: 16.4 % (ref 11.5–15)
GFR, ESTIMATED: 81 ML/MIN/1.73M2
GLUCOSE SERPL-MCNC: 57 MG/DL (ref 74–99)
HCT VFR BLD AUTO: 32 % (ref 37–54)
HGB BLD-MCNC: 10 G/DL (ref 12.5–16.5)
MCH RBC QN AUTO: 29.7 PG (ref 26–35)
MCHC RBC AUTO-ENTMCNC: 31.3 G/DL (ref 32–34.5)
MCV RBC AUTO: 95 FL (ref 80–99.9)
PLATELET # BLD AUTO: 361 K/UL (ref 130–450)
PMV BLD AUTO: 9.4 FL (ref 7–12)
POTASSIUM SERPL-SCNC: 4.4 MMOL/L (ref 3.5–5)
PROT SERPL-MCNC: 6.4 G/DL (ref 6.4–8.3)
RBC # BLD AUTO: 3.37 M/UL (ref 3.8–5.8)
SODIUM SERPL-SCNC: 136 MMOL/L (ref 132–146)
WBC OTHER # BLD: 6.4 K/UL (ref 4.5–11.5)

## 2025-02-19 PROCEDURE — 3609011100 HC BRONCHOSCOPY BRUSHINGS: Performed by: INTERNAL MEDICINE

## 2025-02-19 PROCEDURE — 6360000002 HC RX W HCPCS

## 2025-02-19 PROCEDURE — 88173 CYTOPATH EVAL FNA REPORT: CPT

## 2025-02-19 PROCEDURE — 7100000001 HC PACU RECOVERY - ADDTL 15 MIN: Performed by: INTERNAL MEDICINE

## 2025-02-19 PROCEDURE — 3700000001 HC ADD 15 MINUTES (ANESTHESIA): Performed by: INTERNAL MEDICINE

## 2025-02-19 PROCEDURE — 2709999900 HC NON-CHARGEABLE SUPPLY: Performed by: INTERNAL MEDICINE

## 2025-02-19 PROCEDURE — 2720000010 HC SURG SUPPLY STERILE: Performed by: INTERNAL MEDICINE

## 2025-02-19 PROCEDURE — 3609010800 HC BRONCHOSCOPY ALVEOLAR LAVAGE: Performed by: INTERNAL MEDICINE

## 2025-02-19 PROCEDURE — 7100000010 HC PHASE II RECOVERY - FIRST 15 MIN: Performed by: INTERNAL MEDICINE

## 2025-02-19 PROCEDURE — 88305 TISSUE EXAM BY PATHOLOGIST: CPT

## 2025-02-19 PROCEDURE — 88112 CYTOPATH CELL ENHANCE TECH: CPT

## 2025-02-19 PROCEDURE — 85027 COMPLETE CBC AUTOMATED: CPT

## 2025-02-19 PROCEDURE — 3609027000 HC BRONCHOSCOPY: Performed by: INTERNAL MEDICINE

## 2025-02-19 PROCEDURE — 80053 COMPREHEN METABOLIC PANEL: CPT

## 2025-02-19 PROCEDURE — 88342 IMHCHEM/IMCYTCHM 1ST ANTB: CPT

## 2025-02-19 PROCEDURE — C1601 HC ENDOSCOPE, PULM, IMAGING/ILLUMINATION DEVICE: HCPCS | Performed by: INTERNAL MEDICINE

## 2025-02-19 PROCEDURE — 2580000003 HC RX 258

## 2025-02-19 PROCEDURE — 88172 CYTP DX EVAL FNA 1ST EA SITE: CPT

## 2025-02-19 PROCEDURE — 3700000000 HC ANESTHESIA ATTENDED CARE: Performed by: INTERNAL MEDICINE

## 2025-02-19 PROCEDURE — 3609011300 HC BRONCHOSCOPY BRONCHIAL/ENDOBRNCL BX 1+ SITES: Performed by: INTERNAL MEDICINE

## 2025-02-19 PROCEDURE — 7100000000 HC PACU RECOVERY - FIRST 15 MIN: Performed by: INTERNAL MEDICINE

## 2025-02-19 PROCEDURE — 71045 X-RAY EXAM CHEST 1 VIEW: CPT

## 2025-02-19 PROCEDURE — 2500000003 HC RX 250 WO HCPCS

## 2025-02-19 PROCEDURE — 3609020000 HC BRONCHOSCOPY W/EBUS FNA 3/> NODE: Performed by: INTERNAL MEDICINE

## 2025-02-19 PROCEDURE — 7100000011 HC PHASE II RECOVERY - ADDTL 15 MIN: Performed by: INTERNAL MEDICINE

## 2025-02-19 PROCEDURE — 88341 IMHCHEM/IMCYTCHM EA ADD ANTB: CPT

## 2025-02-19 PROCEDURE — 2500000003 HC RX 250 WO HCPCS: Performed by: INTERNAL MEDICINE

## 2025-02-19 RX ORDER — NALOXONE HYDROCHLORIDE 0.4 MG/ML
INJECTION, SOLUTION INTRAMUSCULAR; INTRAVENOUS; SUBCUTANEOUS PRN
Status: DISCONTINUED | OUTPATIENT
Start: 2025-02-19 | End: 2025-02-19 | Stop reason: HOSPADM

## 2025-02-19 RX ORDER — SODIUM CHLORIDE 0.9 % (FLUSH) 0.9 %
5-40 SYRINGE (ML) INJECTION EVERY 12 HOURS SCHEDULED
Status: DISCONTINUED | OUTPATIENT
Start: 2025-02-19 | End: 2025-02-19 | Stop reason: HOSPADM

## 2025-02-19 RX ORDER — SODIUM CHLORIDE 0.9 % (FLUSH) 0.9 %
5-40 SYRINGE (ML) INJECTION PRN
Status: DISCONTINUED | OUTPATIENT
Start: 2025-02-19 | End: 2025-02-19 | Stop reason: HOSPADM

## 2025-02-19 RX ORDER — DEXAMETHASONE SODIUM PHOSPHATE 10 MG/ML
INJECTION, SOLUTION INTRA-ARTICULAR; INTRALESIONAL; INTRAMUSCULAR; INTRAVENOUS; SOFT TISSUE
Status: DISCONTINUED | OUTPATIENT
Start: 2025-02-19 | End: 2025-02-19 | Stop reason: SDUPTHER

## 2025-02-19 RX ORDER — ROCURONIUM BROMIDE 10 MG/ML
INJECTION, SOLUTION INTRAVENOUS
Status: DISCONTINUED | OUTPATIENT
Start: 2025-02-19 | End: 2025-02-19 | Stop reason: SDUPTHER

## 2025-02-19 RX ORDER — SODIUM CHLORIDE 9 MG/ML
INJECTION, SOLUTION INTRAVENOUS PRN
Status: DISCONTINUED | OUTPATIENT
Start: 2025-02-19 | End: 2025-02-19 | Stop reason: HOSPADM

## 2025-02-19 RX ORDER — ONDANSETRON 2 MG/ML
INJECTION INTRAMUSCULAR; INTRAVENOUS
Status: DISCONTINUED | OUTPATIENT
Start: 2025-02-19 | End: 2025-02-19 | Stop reason: SDUPTHER

## 2025-02-19 RX ORDER — MEPERIDINE HYDROCHLORIDE 25 MG/ML
12.5 INJECTION INTRAMUSCULAR; INTRAVENOUS; SUBCUTANEOUS
Status: DISCONTINUED | OUTPATIENT
Start: 2025-02-19 | End: 2025-02-19 | Stop reason: HOSPADM

## 2025-02-19 RX ORDER — HYDROMORPHONE HYDROCHLORIDE 1 MG/ML
0.5 INJECTION, SOLUTION INTRAMUSCULAR; INTRAVENOUS; SUBCUTANEOUS EVERY 5 MIN PRN
Status: DISCONTINUED | OUTPATIENT
Start: 2025-02-19 | End: 2025-02-19 | Stop reason: HOSPADM

## 2025-02-19 RX ORDER — PROPOFOL 10 MG/ML
INJECTION, EMULSION INTRAVENOUS
Status: DISCONTINUED | OUTPATIENT
Start: 2025-02-19 | End: 2025-02-19 | Stop reason: SDUPTHER

## 2025-02-19 RX ORDER — MIDAZOLAM HYDROCHLORIDE 1 MG/ML
INJECTION, SOLUTION INTRAMUSCULAR; INTRAVENOUS
Status: DISCONTINUED | OUTPATIENT
Start: 2025-02-19 | End: 2025-02-19 | Stop reason: SDUPTHER

## 2025-02-19 RX ORDER — HYDROMORPHONE HYDROCHLORIDE 1 MG/ML
0.25 INJECTION, SOLUTION INTRAMUSCULAR; INTRAVENOUS; SUBCUTANEOUS EVERY 5 MIN PRN
Status: DISCONTINUED | OUTPATIENT
Start: 2025-02-19 | End: 2025-02-19 | Stop reason: HOSPADM

## 2025-02-19 RX ORDER — PHENYLEPHRINE HCL IN 0.9% NACL 1 MG/10 ML
SYRINGE (ML) INTRAVENOUS
Status: DISCONTINUED | OUTPATIENT
Start: 2025-02-19 | End: 2025-02-19 | Stop reason: SDUPTHER

## 2025-02-19 RX ORDER — LIDOCAINE HYDROCHLORIDE 20 MG/ML
INJECTION, SOLUTION INTRAVENOUS
Status: DISCONTINUED | OUTPATIENT
Start: 2025-02-19 | End: 2025-02-19 | Stop reason: SDUPTHER

## 2025-02-19 RX ORDER — SODIUM CHLORIDE 9 MG/ML
INJECTION, SOLUTION INTRAVENOUS
Status: DISCONTINUED | OUTPATIENT
Start: 2025-02-19 | End: 2025-02-19 | Stop reason: SDUPTHER

## 2025-02-19 RX ORDER — FENTANYL CITRATE 50 UG/ML
INJECTION, SOLUTION INTRAMUSCULAR; INTRAVENOUS
Status: DISCONTINUED | OUTPATIENT
Start: 2025-02-19 | End: 2025-02-19 | Stop reason: SDUPTHER

## 2025-02-19 RX ORDER — ONDANSETRON 2 MG/ML
4 INJECTION INTRAMUSCULAR; INTRAVENOUS
Status: DISCONTINUED | OUTPATIENT
Start: 2025-02-19 | End: 2025-02-19 | Stop reason: HOSPADM

## 2025-02-19 RX ADMIN — Medication 100 MCG: at 16:14

## 2025-02-19 RX ADMIN — ROCURONIUM BROMIDE 10 MG: 10 INJECTION, SOLUTION INTRAVENOUS at 15:48

## 2025-02-19 RX ADMIN — FENTANYL CITRATE 50 MCG: 50 INJECTION, SOLUTION INTRAMUSCULAR; INTRAVENOUS at 14:55

## 2025-02-19 RX ADMIN — Medication 100 MCG: at 15:20

## 2025-02-19 RX ADMIN — DEXAMETHASONE SODIUM PHOSPHATE 10 MG: 10 INJECTION INTRAMUSCULAR; INTRAVENOUS at 15:11

## 2025-02-19 RX ADMIN — ROCURONIUM BROMIDE 10 MG: 10 INJECTION, SOLUTION INTRAVENOUS at 15:30

## 2025-02-19 RX ADMIN — SUGAMMADEX 116 MG: 100 INJECTION, SOLUTION INTRAVENOUS at 16:22

## 2025-02-19 RX ADMIN — SODIUM CHLORIDE: 9 INJECTION, SOLUTION INTRAVENOUS at 14:50

## 2025-02-19 RX ADMIN — Medication 200 MCG: at 15:23

## 2025-02-19 RX ADMIN — ONDANSETRON 4 MG: 2 INJECTION, SOLUTION INTRAMUSCULAR; INTRAVENOUS at 15:11

## 2025-02-19 RX ADMIN — Medication 200 MCG: at 15:42

## 2025-02-19 RX ADMIN — Medication 200 MCG: at 15:35

## 2025-02-19 RX ADMIN — PROPOFOL 100 MG: 10 INJECTION, EMULSION INTRAVENOUS at 14:55

## 2025-02-19 RX ADMIN — ROCURONIUM BROMIDE 10 MG: 10 INJECTION, SOLUTION INTRAVENOUS at 15:54

## 2025-02-19 RX ADMIN — Medication 100 MCG: at 16:12

## 2025-02-19 RX ADMIN — ROCURONIUM BROMIDE 50 MG: 10 INJECTION, SOLUTION INTRAVENOUS at 14:55

## 2025-02-19 RX ADMIN — Medication 100 MCG: at 15:03

## 2025-02-19 RX ADMIN — FENTANYL CITRATE 25 MCG: 50 INJECTION, SOLUTION INTRAMUSCULAR; INTRAVENOUS at 15:54

## 2025-02-19 RX ADMIN — MIDAZOLAM 2 MG: 1 INJECTION INTRAMUSCULAR; INTRAVENOUS at 14:53

## 2025-02-19 RX ADMIN — Medication 100 MCG: at 15:19

## 2025-02-19 RX ADMIN — LIDOCAINE HYDROCHLORIDE 60 MG: 20 INJECTION, SOLUTION INTRAVENOUS at 14:55

## 2025-02-19 RX ADMIN — SODIUM CHLORIDE, PRESERVATIVE FREE 25 ML: 5 INJECTION INTRAVENOUS at 10:34

## 2025-02-19 ASSESSMENT — ENCOUNTER SYMPTOMS
RESPIRATORY NEGATIVE: 1
VOMITING: 0
NAUSEA: 0
COLOR CHANGE: 0
ABDOMINAL PAIN: 0
EYES NEGATIVE: 1

## 2025-02-19 ASSESSMENT — LIFESTYLE VARIABLES: SMOKING_STATUS: 0

## 2025-02-19 ASSESSMENT — PAIN - FUNCTIONAL ASSESSMENT
PAIN_FUNCTIONAL_ASSESSMENT: 0-10
PAIN_FUNCTIONAL_ASSESSMENT: 0-10

## 2025-02-19 NOTE — PROGRESS NOTES
Patient transferred from PACU to DA phase of care. Patient u and walked to the restroom. Voided without difficulty. Patient assisted with getting dressed and walked back to space to await chest x ray results. Assisted with liquids.

## 2025-02-19 NOTE — H&P
Nico Lam M.D.  Yoav Garduno D.O.  Kimberlyn Mcmahan M.D.  Ashia Guevara M.D.  Abilio Dukes D.O.  Cameron Michelle M.D.       Patient:  Mike Morrell Jr. 74 y.o. male MRN: [id number]     Date of Service: 2/19/2025      H&P/PULMONARY CONSULTATION      No chief complaint on file.        Code Status: Full Code        SUBJECTIVE:    HPI:  Patient presents for outpatient navigational bronchoscopy EBUS.  Interim H&P, see note 2/5/2025 for details.  No change in health status last being seen.      Past Medical History:   Diagnosis Date    Cancer (HCC)     Diarrhea     H/O ETOH abuse     History of recent fall     Hyperlipidemia     Hypertension     Lung mass     PONV (postoperative nausea and vomiting)     Urinary frequency      Past Surgical History:   Procedure Laterality Date    CARDIAC PACEMAKER REMOVAL      2010-d/t infection    CARDIAC SURGERY      between 2008 - 2009    COLONOSCOPY  05/20/2024    CORONARY ARTERY BYPASS GRAFT      CT NEEDLE BIOPSY LUNG PERCUTANEOUS W IMAGING GUIDANCE  11/27/2024    CT NEEDLE BIOPSY LUNG PERCUTANEOUS 11/27/2024 [facility] CT    HIP SURGERY Left 11/30/2024    LEFT HIP HEMIARTHROPLASTY performed by César Miller DO at [facility] OR    INGUINAL HERNIA REPAIR Bilateral 11/01/2013    [facility] - Laparoscopic Robotic Assisted, with Mesh    PACEMAKER PLACEMENT      and removed due to infection    SIGMOIDOSCOPY N/A 05/20/2024    SIGMOIDOSCOPY DIAGNOSTIC FLEXIBLE performed by Dayan Zuleta MD at [facility] ENDOSCOPY    UMBILICAL HERNIA REPAIR  11/01/2013    [facility] - Laparoscopic with Mesh    VENTRAL HERNIA REPAIR  11/01/2013    [facility] - Laparoscopic with Mesh     Family History   Problem Relation Age of Onset    Cancer Mother 83        pancreatic    Heart Attack Father     Cancer Maternal Aunt 80        stomach    Kidney Disease Maternal Aunt          Social History:   Social History     Socioeconomic History    Marital status:      Spouse name: Not on file    Number of

## 2025-02-19 NOTE — ANESTHESIA PRE PROCEDURE
Department of Anesthesiology  Preprocedure Note       Name:  Mike Morrell Jr.   Age:  74 y.o.  :  1950                                          MRN:  [id number]         Date:  2024      Surgeon: Surgeon(s):  César Miller DO    Procedure: Procedure(s):  LEFT HIP HEMIARTHROPLASTY    +++SEUN+++    Medications prior to admission:   Prior to Admission medications    Medication Sig Start Date End Date Taking? Authorizing Provider   diazePAM (VALIUM) 5 MG tablet 1 tablet  in the morning and 1 tablet at noon and 1 tablet in the evening. 24  Yes ProviderCecelia MD   carisoprodol (SOMA) 350 MG tablet Take 1 tablet by mouth 4 times daily as needed for Muscle spasms.   Yes ProviderCecelia MD   HYDROcodone-acetaminophen (NORCO) 5-325 MG per tablet Take 1 tablet by mouth every 6 hours as needed for Pain.   Yes Cecelia Villafana MD   amLODIPine (NORVASC) 5 MG tablet Take 1 tablet by mouth daily 21  Yes ProviderCecelia MD   lisinopril (PRINIVIL;ZESTRIL) 20 MG tablet Take 1 tablet by mouth daily 21  Yes ProviderCecelia MD   Ipratropium-Albuterol (COMBIVENT IN) Inhale into the lungs as needed   Yes ProviderCecelia MD   nitroGLYCERIN (NITROSTAT) 0.4 MG SL tablet Place 1 tablet under the tongue every 5 minutes as needed for Chest pain. 14  Yes Dwayne Hawley MD   tamsulosin (FLOMAX) 0.4 MG capsule Take 1 capsule by mouth daily   Yes ProviderCecelia MD       Current medications:    Current Facility-Administered Medications   Medication Dose Route Frequency Provider Last Rate Last Admin    albuterol (PROVENTIL) (2.5 MG/3ML) 0.083% nebulizer solution 2.5 mg  2.5 mg Nebulization Q6H PRN Abilio Dukes DO        ferrous sulfate (IRON 325) tablet 325 mg  325 mg Oral Daily with breakfast El Ani Rodriguez MD        oxyCODONE-acetaminophen (PERCOCET) 5-325 MG per tablet 1 tablet  1 tablet Oral Q6H PRN Gus Uriarte DO   1 tablet at 24 0622    folic 
Department of Anesthesiology  Preprocedure Note       Name:  Mike Morrell Jr.   Age:  74 y.o.  :  1950                                          MRN:  [id number]         Date:  2025      Surgeon: Surgeon(s):  Abilio Dukes DO    Procedure: Procedure(s):  BRONCHOSCOPY ENDOBRONCHIAL ULTRASOUND FINE NEEDLE ASPIRATION  BRONCHOSCOPY ENDOBRONCHIAL ULTRASOUND FINE NEEDLE ASPIRATION ROBOTIC  BRONCHOSCOPY DIAGNOSTIC OR CELL WASH ONLY  BRONCHOSCOPY BIOPSY BRONCHUS ROBOTIC  BRONCHOSCOPY BRUSHINGS ROBOTIC  BRONCHOSCOPY ALVEOLAR LAVAGE ROBOTIC    Medications prior to admission:   Prior to Admission medications    Medication Sig Start Date End Date Taking? Authorizing Provider   lisinopril (PRINIVIL;ZESTRIL) 2.5 MG tablet Take 80 tablets by mouth daily   Yes Cecelia Villafana MD   atorvastatin (LIPITOR) 40 MG tablet Take 1 tablet by mouth daily   Yes Cecelia Villafana MD   thiamine 100 MG tablet Take 1 tablet by mouth daily 25  Yes Gus Uriarte DO   albuterol sulfate HFA (VENTOLIN HFA) 108 (90 Base) MCG/ACT inhaler Inhale 2 puffs into the lungs every 6 hours as needed for Wheezing or Shortness of Breath   Yes Cecelia Villafana MD   ferrous sulfate (IRON 325) 325 (65 Fe) MG tablet Take 1 tablet by mouth daily (with breakfast) 25  Yes Ani Andrews MD   HYDROcodone-acetaminophen (NORCO) 5-325 MG per tablet Take 1 tablet by mouth daily as needed for Pain.   Yes Cecelia Villafana MD   diazePAM (VALIUM) 5 MG tablet Take 1 tablet by mouth 3 times daily as needed (ANXIETY).   Yes Cecelia Villafana MD   folic acid (FOLVITE) 1 MG tablet Take 1 tablet by mouth daily   Yes Cecelia Villafana MD   aspirin 81 MG chewable tablet Take 1 tablet by mouth daily 24  Yes Gus Uriarte DO   Ipratropium-Albuterol (COMBIVENT IN) Inhale into the lungs  Patient not taking: Reported on 2025    Cecelia Villafana MD   metoprolol succinate (TOPROL XL) 25 MG extended release tablet Take 
6.4 02/19/2025 10:30 AM    RBC 3.37 02/19/2025 10:30 AM    HGB 10.0 02/19/2025 10:30 AM    HCT 32.0 02/19/2025 10:30 AM    MCV 95.0 02/19/2025 10:30 AM    RDW 16.4 02/19/2025 10:30 AM     02/19/2025 10:30 AM       CMP:   Lab Results   Component Value Date/Time     02/19/2025 10:30 AM    K 4.4 02/19/2025 10:30 AM     02/19/2025 10:30 AM    CO2 24 02/19/2025 10:30 AM    BUN 9 02/19/2025 10:30 AM    CREATININE 1.0 02/19/2025 10:30 AM    GFRAA > 60 12/16/2024 07:05 AM    LABGLOM 81 02/19/2025 10:30 AM    GLUCOSE 57 02/19/2025 10:30 AM    GLUCOSE 94 12/16/2024 07:05 AM    CALCIUM 8.5 02/19/2025 10:30 AM    BILITOT <0.2 02/19/2025 10:30 AM    ALKPHOS 129 02/19/2025 10:30 AM    AST 14 02/19/2025 10:30 AM    ALT 10 02/19/2025 10:30 AM       POC Tests: No results for input(s): \"POCGLU\", \"POCNA\", \"POCK\", \"POCCL\", \"POCBUN\", \"POCHEMO\", \"POCHCT\" in the last 72 hours.    Coags:   Lab Results   Component Value Date/Time    PROTIME 10.2 11/27/2024 07:30 AM    INR 0.9 11/27/2024 07:30 AM    APTT 48.8 02/07/2025 02:30 AM       HCG (If Applicable): No results found for: \"PREGTESTUR\", \"PREGSERUM\", \"HCG\", \"HCGQUANT\"     ABGs: No results found for: \"PHART\", \"PO2ART\", \"SBB3QAL\", \"WHK3ZGJ\", \"BEART\", \"O3FYODAM\"     Type & Screen (If Applicable):  Lab Results   Component Value Date    ABORH O NEGATIVE 11/26/2024    LABANTI NEGATIVE 11/26/2024       Drug/Infectious Status (If Applicable):  No results found for: \"HIV\", \"HEPCAB\"    COVID-19 Screening (If Applicable):   Lab Results   Component Value Date/Time    COVID19 Not Detected 02/06/2025 12:10 AM    COVID19 Not Detected 04/25/2021 12:15 PM           Anesthesia Evaluation  Patient summary reviewed and Nursing notes reviewed  Airway: Mallampati: II  TM distance: >3 FB   Neck ROM: full  Mouth opening: > = 3 FB   Dental:          Pulmonary: breath sounds clear to auscultation                             Cardiovascular:    (+) hypertension: moderate, past MI: > 6 months, CHF:

## 2025-02-19 NOTE — ANESTHESIA POSTPROCEDURE EVALUATION
Department of Anesthesiology  Postprocedure Note    Patient: Mike Morrell Jr.  MRN: [id number]  YOB: 1950  Date of evaluation: 2/19/2025    Procedure Summary       Date: 02/19/25 Room / Location: [place] [address] / [facility]    Anesthesia Start: 1445 Anesthesia Stop: 1638    Procedures:       BRONCHOSCOPY ENDOBRONCHIAL ULTRASOUND FINE NEEDLE ASPIRATION      BRONCHOSCOPY ENDOBRONCHIAL ULTRASOUND FINE NEEDLE ASPIRATION ROBOTIC      BRONCHOSCOPY DIAGNOSTIC OR CELL WASH ONLY      BRONCHOSCOPY BIOPSY BRONCHUS ROBOTIC      BRONCHOSCOPY BRUSHINGS ROBOTIC      BRONCHOSCOPY ALVEOLAR LAVAGE ROBOTIC Diagnosis:       Mass of left lung      (Mass of left lung [R91.8])    Surgeons: Abilio Dukes DO Responsible Provider: Hans Hutson MD    Anesthesia Type: general ASA Status: 3            Anesthesia Type: No value filed.    Sneha Phase I: Sneha Score: 10    Senha Phase II: Sneha Score: 10    Anesthesia Post Evaluation    Patient location during evaluation: PACU  Patient participation: complete - patient participated  Level of consciousness: awake  Airway patency: patent  Nausea & Vomiting: no nausea and no vomiting  Cardiovascular status: hemodynamically stable  Respiratory status: acceptable  Hydration status: stable  Pain management: adequate    No notable events documented.

## 2025-02-20 NOTE — PROCEDURES
Bronchoscopy Procedure Note  Procedure Date: 2/20/25   Procedure: Galaxy robotic navigational bronchoscopy with EBUS  Location: [place] [facility]  Attending: Dr. Dukes  Anesthesia: General anesthesia, see record    Indications: Bilateral lung mass, mediastinal adenopathy    Findings:  1.  Irregular necrotic appearing parenchyma right upper lobe  2. + Rapid onsite evaluation from needle aspiration of left lung  3.  Enlarged station 10L lymph node  4.  Scattered thick secretions    Procedures Performed:  1.  Video bronchoscopy airway survey  2.  Galaxy robotic preoperative planning with navigation performed to right upper lobe and to left upper lobe lesion  3.  Transbronchial needle aspiration and transbronchial biopsy of right upper lobe under fluoroscopic guidance  4.  BAL right upper lobe  5.  Transbronchial needle aspiration and transbronchial biopsy of left upper lobe under fluoroscopic guidance  6.  EBUS survey with transbronchial needle aspiration of station 10L lymph node    Consent:  The risks, benefits, indications, potential complications and alternatives were explained and informed consent obtained on 2/20/25     Description of Procedure:  Galaxy robotic navigation performed for bilateral lung mass with EBUS for mediastinal adenopathy.  Preoperative planning using [employer] navigation system was performed to the right upper lobe and the left upper lesion.  Patient had general anesthesia, see record.  Timeout called prior to procedure and agreed to proper patient procedure.  Intubated with 9.0 endotracheal tube.  Airway survey performed RB 1-10 LB 1-10.  There were scattered thick secretions.  Galaxy robotic system connected and catheter navigated to right upper lobe and question.  Under fluoroscopic guidance transbronchial needle aspiration and transbronchial biopsies were performed. + Rapid onsite evaluation of right upper lobe lesion.  BAL sent for cytology right upper lobe.  Navigation system

## 2025-02-26 ENCOUNTER — TELEPHONE (OUTPATIENT)
Dept: INFUSION THERAPY | Age: 75
End: 2025-02-26

## 2025-02-26 DIAGNOSIS — R91.8 LUNG MASS: Primary | ICD-10-CM

## 2025-02-26 LAB — NON-GYN CYTOLOGY REPORT: NORMAL

## 2025-02-26 NOTE — TELEPHONE ENCOUNTER
Spoke with patients daughter about the need for a Pulmonary Function Test prior to scheduling an appointment with the Cardiovascular Surgery Office. They stated they are waiting for biopsy results and will schedule it after if they decide to proceed with surgery. Number for [facility] Scheduling was given and all questions answered at this time.

## 2025-03-05 ENCOUNTER — HOSPITAL ENCOUNTER (OUTPATIENT)
Dept: INFUSION THERAPY | Age: 75
Discharge: HOME | End: 2025-03-05

## 2025-03-05 ENCOUNTER — OFFICE VISIT (OUTPATIENT)
Dept: ONCOLOGY | Age: 75
End: 2025-03-05

## 2025-03-05 VITALS
OXYGEN SATURATION: 98 % | DIASTOLIC BLOOD PRESSURE: 62 MMHG | TEMPERATURE: 98 F | BODY MASS INDEX: 20.46 KG/M2 | SYSTOLIC BLOOD PRESSURE: 142 MMHG | HEART RATE: 63 BPM | HEIGHT: 68 IN | WEIGHT: 135 LBS

## 2025-03-05 DIAGNOSIS — R91.8 LUNG MASS: ICD-10-CM

## 2025-03-05 DIAGNOSIS — D64.9 ANEMIA, UNSPECIFIED TYPE: Primary | ICD-10-CM

## 2025-03-05 NOTE — PROGRESS NOTES
[facility]  [address] [address]  Dept: [phone]  Loc: [phone]  Attending Progress Note      Reason for Visit:   Non-small cell lung cancer, squamous cell carcinoma    Referring Physician:   Keely Diop, APRN - CNP     PCP:  Eric Jackson MD    History of Present Illness:     Mr. Morrell is a 74-year-old gentleman, with a past medical history significant for hyperlipidemia, alcohol abuse, and hypertension, he is a former smoker, who had presented to the ED on 11/26/2024 with left hip pain status post mechanical fall, hip x-ray had revealed acute fracture involving the left femoral neck subcapital region with foreshortening and mild displacement.  Chest x-ray had revealed masslike density along the left lateral margin of the heart, 6.4 cm focus somewhat indeterminate for vascular or cardiac origin versus adjacent mass in the left midlung, he subsequently had a CT scan of the chest done revealing a 5.1 cm solid spiculated mass in the lingular segment of the left upper lobe with adjacent subcentimeter satellite nodule, 1.2 cm spiculated nodule in the right upper lobe suspicious for a second primary neoplasm.  Healing on community nondisplaced fractures of the anterior left third and fourth rib.  The patient underwent on 11/27/2024 biopsy of the left lung mass.  Pathology was consistent with with fibrotic lung tissue with fibroelastosis, pigmented histiocytes, lymphoplasmacytic inflammation, and scant atypical cells.  The patient had a left pneumothorax requiring chest tube placement.  The sample was insufficient for the diagnosis of malignancy.  Upon presentation blood work was remarkable for a sodium level of 117, calcium 8.8.  The patient was seen by nephrology, was diagnosed with SIADH.  CT scans of the abdomen, pelvis, bone scan and brain MRI were negative for metastatic disease.  PET scan was done on

## 2025-03-05 NOTE — PROGRESS NOTES
Patient refused AVS, Has My Chart. Scans must be scheduled before I can schedule next appointment.

## 2025-03-06 ENCOUNTER — HOSPITAL ENCOUNTER (OUTPATIENT)
Dept: RADIATION ONCOLOGY | Age: 75
Discharge: HOME | End: 2025-03-06
Payer: MEDICARE

## 2025-03-06 ENCOUNTER — HOSPITAL ENCOUNTER (OUTPATIENT)
Dept: CT IMAGING | Age: 75
Discharge: HOME | End: 2025-03-08

## 2025-03-06 LAB — SURGICAL PATHOLOGY REPORT: NORMAL

## 2025-03-06 PROCEDURE — 77334 RADIATION TREATMENT AID(S): CPT | Performed by: RADIOLOGY

## 2025-03-10 ENCOUNTER — TELEPHONE (OUTPATIENT)
Dept: CASE MANAGEMENT | Age: 75
End: 2025-03-10

## 2025-03-10 NOTE — TELEPHONE ENCOUNTER
Radiation team notified of concurrent start date of 3/19 with Dr. Michael Rodriguez. Will have front office staff schedule appointment.

## 2025-03-11 LAB — SURGICAL PATHOLOGY REPORT: NORMAL

## 2025-03-13 DIAGNOSIS — R91.8 LUNG MASS: Primary | ICD-10-CM

## 2025-03-13 RX ORDER — ONDANSETRON 8 MG/1
8 TABLET, ORALLY DISINTEGRATING ORAL EVERY 8 HOURS PRN
Qty: 30 TABLET | Refills: 2 | Status: SHIPPED | OUTPATIENT
Start: 2025-03-13

## 2025-03-13 RX ORDER — PROCHLORPERAZINE MALEATE 10 MG
10 TABLET ORAL EVERY 6 HOURS PRN
Qty: 50 TABLET | Refills: 2 | Status: SHIPPED | OUTPATIENT
Start: 2025-03-13

## 2025-03-17 ENCOUNTER — HOSPITAL ENCOUNTER (OUTPATIENT)
Dept: RADIATION ONCOLOGY | Age: 75
Discharge: HOME | End: 2025-03-17
Payer: MEDICARE

## 2025-03-17 PROCEDURE — 77300 RADIATION THERAPY DOSE PLAN: CPT | Performed by: RADIOLOGY

## 2025-03-17 PROCEDURE — 77338 DESIGN MLC DEVICE FOR IMRT: CPT | Performed by: RADIOLOGY

## 2025-03-17 PROCEDURE — 77301 RADIOTHERAPY DOSE PLAN IMRT: CPT | Performed by: RADIOLOGY

## 2025-03-19 ENCOUNTER — TELEPHONE (OUTPATIENT)
Dept: CASE MANAGEMENT | Age: 75
End: 2025-03-19

## 2025-03-19 ENCOUNTER — OFFICE VISIT (OUTPATIENT)
Dept: ONCOLOGY | Age: 75
End: 2025-03-19

## 2025-03-19 ENCOUNTER — HOSPITAL ENCOUNTER (OUTPATIENT)
Dept: RADIATION ONCOLOGY | Age: 75
Discharge: HOME | End: 2025-03-19
Payer: MEDICARE

## 2025-03-19 VITALS
SYSTOLIC BLOOD PRESSURE: 150 MMHG | WEIGHT: 125 LBS | HEART RATE: 77 BPM | DIASTOLIC BLOOD PRESSURE: 62 MMHG | TEMPERATURE: 98.1 F | BODY MASS INDEX: 18.94 KG/M2 | HEIGHT: 68 IN | OXYGEN SATURATION: 98 %

## 2025-03-19 DIAGNOSIS — Z51.11 ENCOUNTER FOR CHEMOTHERAPY MANAGEMENT: ICD-10-CM

## 2025-03-19 DIAGNOSIS — D64.9 ANEMIA, UNSPECIFIED TYPE: ICD-10-CM

## 2025-03-19 DIAGNOSIS — C34.12 MALIGNANT NEOPLASM OF UPPER LOBE OF LEFT LUNG (HCC): Primary | ICD-10-CM

## 2025-03-19 PROCEDURE — 77386 HC NTSTY MODUL RAD TX DLVR CPLX: CPT | Performed by: RADIOLOGY

## 2025-03-19 RX ORDER — ALBUTEROL SULFATE 90 UG/1
4 INHALANT RESPIRATORY (INHALATION) PRN
Status: CANCELLED | OUTPATIENT
Start: 2025-03-19

## 2025-03-19 RX ORDER — HEPARIN SODIUM (PORCINE) LOCK FLUSH IV SOLN 100 UNIT/ML 100 UNIT/ML
500 SOLUTION INTRAVENOUS PRN
Status: CANCELLED | OUTPATIENT
Start: 2025-03-19

## 2025-03-19 RX ORDER — SODIUM CHLORIDE 9 MG/ML
5-250 INJECTION, SOLUTION INTRAVENOUS PRN
Status: CANCELLED | OUTPATIENT
Start: 2025-03-19

## 2025-03-19 RX ORDER — HYDROCORTISONE SODIUM SUCCINATE 100 MG/2ML
100 INJECTION INTRAMUSCULAR; INTRAVENOUS
Status: CANCELLED | OUTPATIENT
Start: 2025-03-19

## 2025-03-19 RX ORDER — SODIUM CHLORIDE 9 MG/ML
INJECTION, SOLUTION INTRAVENOUS CONTINUOUS
Status: CANCELLED | OUTPATIENT
Start: 2025-03-19

## 2025-03-19 RX ORDER — EPINEPHRINE 1 MG/ML
0.3 INJECTION, SOLUTION, CONCENTRATE INTRAVENOUS PRN
Status: CANCELLED | OUTPATIENT
Start: 2025-03-19

## 2025-03-19 RX ORDER — SODIUM CHLORIDE 0.9 % (FLUSH) 0.9 %
5-40 SYRINGE (ML) INJECTION PRN
Status: CANCELLED | OUTPATIENT
Start: 2025-03-19

## 2025-03-19 RX ORDER — DIPHENHYDRAMINE HYDROCHLORIDE 50 MG/ML
50 INJECTION, SOLUTION INTRAMUSCULAR; INTRAVENOUS ONCE
Status: CANCELLED | OUTPATIENT
Start: 2025-03-19 | End: 2025-03-26

## 2025-03-19 RX ORDER — MEPERIDINE HYDROCHLORIDE 50 MG/ML
12.5 INJECTION INTRAMUSCULAR; INTRAVENOUS; SUBCUTANEOUS PRN
Status: CANCELLED | OUTPATIENT
Start: 2025-03-19

## 2025-03-19 RX ORDER — ONDANSETRON 2 MG/ML
8 INJECTION INTRAMUSCULAR; INTRAVENOUS
Status: CANCELLED | OUTPATIENT
Start: 2025-03-19

## 2025-03-19 RX ORDER — PALONOSETRON 0.05 MG/ML
0.25 INJECTION, SOLUTION INTRAVENOUS ONCE
Status: CANCELLED | OUTPATIENT
Start: 2025-03-19 | End: 2025-03-26

## 2025-03-19 RX ORDER — FAMOTIDINE 10 MG/ML
20 INJECTION, SOLUTION INTRAVENOUS
Status: CANCELLED | OUTPATIENT
Start: 2025-03-19

## 2025-03-19 RX ORDER — DIPHENHYDRAMINE HYDROCHLORIDE 50 MG/ML
50 INJECTION, SOLUTION INTRAMUSCULAR; INTRAVENOUS
Status: CANCELLED | OUTPATIENT
Start: 2025-03-19

## 2025-03-19 RX ORDER — FAMOTIDINE 10 MG/ML
20 INJECTION, SOLUTION INTRAVENOUS ONCE
Status: CANCELLED | OUTPATIENT
Start: 2025-03-19 | End: 2025-03-26

## 2025-03-19 RX ORDER — ACETAMINOPHEN 325 MG/1
650 TABLET ORAL
Status: CANCELLED | OUTPATIENT
Start: 2025-03-19

## 2025-03-19 NOTE — TELEPHONE ENCOUNTER
Met with patient in the treatment room prior to his chemotherapy treatment for follow up. Patient appears well and is in good spirits. Today is the start of his concurrent chemo/RT treatments. Introduced myself as his primary nurse navigator, as he originally consulted at the [facility]. He is understandably anxious about how he may feel, but he is motivated to get through treatments. I reminded patient to take things one day at a time and be patient with himself as he navigates through potential side effects he may experience. He voiced that he has had some family members pass from different cancer diagnoses over the years. I reminded patient that every diagnosis and every journey is different and that there are many interventions available to him to feel as well as possible during treatment. I provided patient with my contact information and encouraged him to call me if he is having any questions or concerns that need addressed. Provided support and encouragement. His daughter brought him for treatment today and will be back later to pick her up after completion. Denies any current needs for assistance from NN. Patient appreciative of visit. Will continue to follow as needed. Lenore HAJIN, RN, Oncology Nurse Navigator

## 2025-03-19 NOTE — PROGRESS NOTES
Patient provided calendar print out with dates, all questions answered  
treat the left side malignancy with chemo/RT, followed by consultation with durvalumab, and consider SBRT to the right lung mass, recommended weekly carboplatin and Taxol concurrently with ration therapy, the schedule and the side effects of the treatment were reviewed with the patient and his daughter.      Today 3/19/2025, the patient be started on concurrent chemo/RT using weekly carboplatin and Taxol.  Labs reviewed, creatinine is 1.3, he will keep adequate oral fluid intake, will receive intravenous hydration, proceed with carboplatin/Taxol, cycle #1.     Macrocytic anemia, the macrocytosis is likely secondary to alcohol abuse, chronic inflammation, malignancy, blood loss with the surgery, vitamin B12 was borderline, received vitamin B12 injection and was started on oral vitamin B12 supplement, repeat CBC was done, revealing improvement of his hemoglobin, 11.3 G/DL.    RTC in 1 week.    Thank you for allowing us to participate in the care of Mr. Morrell.    LISY MAZARIEGOS MD   HEMATOLOGY/MEDICAL ONCOLOGY  [facility] MEDICAL ONCOLOGY  [address] [place] [address]  Dept: [phone]  Loc: [phone]

## 2025-03-20 ENCOUNTER — HOSPITAL ENCOUNTER (OUTPATIENT)
Dept: RADIATION ONCOLOGY | Age: 75
Discharge: HOME | End: 2025-03-20
Payer: MEDICARE

## 2025-03-20 PROCEDURE — 77386 HC NTSTY MODUL RAD TX DLVR CPLX: CPT | Performed by: RADIOLOGY

## 2025-03-21 ENCOUNTER — HOSPITAL ENCOUNTER (OUTPATIENT)
Dept: RADIATION ONCOLOGY | Age: 75
Discharge: HOME | End: 2025-03-21
Payer: MEDICARE

## 2025-03-21 ENCOUNTER — CLINICAL DOCUMENTATION (OUTPATIENT)
Dept: RADIATION ONCOLOGY | Age: 75
End: 2025-03-21

## 2025-03-21 VITALS
BODY MASS INDEX: 20.28 KG/M2 | DIASTOLIC BLOOD PRESSURE: 54 MMHG | HEART RATE: 59 BPM | TEMPERATURE: 97.1 F | WEIGHT: 133.4 LBS | RESPIRATION RATE: 18 BRPM | SYSTOLIC BLOOD PRESSURE: 99 MMHG

## 2025-03-21 PROCEDURE — 77386 HC NTSTY MODUL RAD TX DLVR CPLX: CPT | Performed by: RADIOLOGY

## 2025-03-21 NOTE — PROGRESS NOTES
Cancer Center: [phone] (FAX: [phone])  [facility]:  [phone] (FAX:  [phone])

## 2025-03-21 NOTE — PROGRESS NOTES
Mike Morrell Jr.  3/21/2025  Wt Readings from Last 3 Encounters:   03/21/25 60.5 kg (133 lb 6.4 oz)   03/19/25 56.7 kg (125 lb)   03/05/25 61.2 kg (135 lb)     Body mass index is 20.28 kg/m².        Treatment Area:ANIL and nodes    Patient was seen today for weekly visit.      Comfort Alteration  KPS:70%  Fatigue: None    Ventilation Alterations  Cough: Yes  Hemoptysis: No  Mucus Color: na  Dyspnea: Yes, with activity  O2 Sat: 92% RA    Nutritional Alteration  Anorexia: No  Nausea: No   Vomiting: No     Skin Alteration   Sensation:none    Radiation Dermatitis:  none    Mucous Membrane Alteration  Voice Changes/ Stridor/Larynx: no  Pharynx & Esophagus: na    Elimination Alterations  Constipation: no  Diarrhea:  no      Emotional  Coping: effective      Injury, potential bleeding or infection: na    Other:na    Lab Results   Component Value Date    WBC 5.0 03/17/2025    HGB 11.3 (L) 03/17/2025    HCT 37.1 03/17/2025     03/17/2025         BP (!) 99/54 Comment: asymptomatic  Pulse 59   Temp 97.1 °F (36.2 °C) (Temporal)   Resp 18   Wt 60.5 kg (133 lb 6.4 oz)   BMI 20.28 kg/m²   BP within normal range? yes     Assessment/Plan:Completed 3/33 fractions; 600/6600 cGy    Dwight Mas RN

## (undated) DEVICE — TOWEL,OR,DSP,ST,BLUE,STD,6/PK,12PK/CS: Brand: MEDLINE

## (undated) DEVICE — SOLUTION IRRIG 3000ML 0.9% SOD CHL USP UROMATIC PLAS CONT

## (undated) DEVICE — MARKER,SKIN,WI/RULER AND LABELS: Brand: MEDLINE

## (undated) DEVICE — BRONCHOSCOPY PACK: Brand: MEDLINE INDUSTRIES, INC.

## (undated) DEVICE — GRADUATE TRIANG MEASURE 1000ML BLK PRNT

## (undated) DEVICE — GLOVE SURG SZ 8 L12IN FNGR THK79MIL GRN LTX FREE

## (undated) DEVICE — HANDPIECE SET WITH COAXIAL HIGH FLOW TIP AND SUCTION TUBE: Brand: INTERPULSE

## (undated) DEVICE — 3M™ COBAN™ NL STERILE NON-LATEX SELF-ADHERENT WRAP, 2084S, 4 IN X 5 YD (10 CM X 4,5 M), 18 ROLLS/CASE: Brand: 3M™ COBAN™

## (undated) DEVICE — ADAPTER TBNG DIA15MM SWVL FBROPT BRONCHSCP TERM 2 AXIS PEEP

## (undated) DEVICE — Device

## (undated) DEVICE — BIT DRL L5IN DIA2.8MM STD ST S STL TWST BUSA

## (undated) DEVICE — SUTURE N ABSRB LOOP 23 IN 1 IN 2.3 MM TAIL BLU BROADBAND

## (undated) DEVICE — DOUBLE BASIN SET: Brand: MEDLINE INDUSTRIES, INC.

## (undated) DEVICE — DRESSING HYDROFIBER AQUACEL AG ADVANTAGE 3.5X10 IN

## (undated) DEVICE — DRAPE,TOP,102X53,STERILE: Brand: MEDLINE

## (undated) DEVICE — KIT PROCEDURE BRONCHOSCOPY GALAXY (MUST BE PURCHASED IN INCREMENTS OF 4 EA)

## (undated) DEVICE — BONE PREPARATION KIT: Brand: BIOPREP

## (undated) DEVICE — KIT SURG W7XL11IN 2 PKT UNTREATED NA

## (undated) DEVICE — APPLICATOR MEDICATED 26 CC SOLUTION HI LT ORNG CHLORAPREP

## (undated) DEVICE — SINGLE USE SUCTION VALVE MAJ-209: Brand: SINGLE USE SUCTION VALVE (STERILE)

## (undated) DEVICE — STRIP,CLOSURE,WOUND,MEDI-STRIP,1/2X4: Brand: MEDLINE

## (undated) DEVICE — AIR/WATER CLEANING ADAPTER FOR OLYMPUS® GI ENDOSCOPE: Brand: BULLDOG®

## (undated) DEVICE — SPONGE GZ W4XL4IN RAYON POLY CVR W/NONWOVEN FAB STRL 2/PK

## (undated) DEVICE — HEWSON SUTURE RETRIEVER: Brand: HEWSON SUTURE RETRIEVER

## (undated) DEVICE — GLOVE ORANGE PI 8   MSG9080

## (undated) DEVICE — COVER,BOOT,FOAM,NON-SKID,HOOK-LOOP,XLG: Brand: MEDLINE INDUSTRIES, INC.

## (undated) DEVICE — PILLOW POS W15XH6XL22IN RASPBERRY FOAM ABD W/ STRP DISP FOR

## (undated) DEVICE — 3M™ STERI-DRAPE™ U-DRAPE 1015: Brand: STERI-DRAPE™

## (undated) DEVICE — TUBING, SUCTION, 9/32" X 10', STRAIGHT: Brand: MEDLINE

## (undated) DEVICE — LIQUIBAND RAPID ADHESIVE 36/CS 0.8ML: Brand: MEDLINE

## (undated) DEVICE — SUTURE STRATAFIX SYMMETRIC PDS + SZ 1 L18IN ABSRB VLT OS-6 SXPP1A201

## (undated) DEVICE — DRILL BIT 2.0MM (5/64'') X 128.0MM

## (undated) DEVICE — SINGLE-USE BIOPSY FORCEPS: Brand: RADIAL JAW 4

## (undated) DEVICE — FEMORAL CANAL PRESSURIZER WITHOUT HUB, MEDIUM

## (undated) DEVICE — NEEDLE ASPIR 22GA L700MM US GUID TREAT DST END FOR

## (undated) DEVICE — BLADE ES L6IN ELASTOMERIC COAT EXT DURABLE BEND UPTO 90DEG

## (undated) DEVICE — RECIPROCATING BLADE HEAVY DUTY, OFFSET  (77.5 X 1.23 X 11.0MM)

## (undated) DEVICE — 1000 S-DRAPE TOWEL DRAPE 10/BX: Brand: STERI-DRAPE™

## (undated) DEVICE — SNARE POLYP M W27MMXL240CM OVL STIFF DISP CAPTIVATOR

## (undated) DEVICE — ELECTRODE PT RET AD L9FT HI MOIST COND ADH HYDRGEL CORDED

## (undated) DEVICE — SYRINGE 20ML LL S/C 50

## (undated) DEVICE — 4-PORT MANIFOLD: Brand: NEPTUNE 2

## (undated) DEVICE — BRUSH CYTO FLX DISP SUPERDIMENSION

## (undated) DEVICE — NEEDLE BRONCHSCP 21GA HNDL SHTH NDL STYL PERIVIEW FLX

## (undated) DEVICE — NEEDLE HYPO 23GA L1.5IN TURQ POLYPR HUB S STL THN WALL IM

## (undated) DEVICE — SPONGE LAP W18XL18IN WHT COT 4 PLY FLD STRUNG RADPQ DISP ST 2 PER PACK

## (undated) DEVICE — ENDOSCOPIC KIT 1.1+ OP4 NO CP DE

## (undated) DEVICE — PEEL-AWAY HOOD: Brand: FLYTE, SURGICOOL

## (undated) DEVICE — 3M™ IOBAN™ 2 ANTIMICROBIAL INCISE DRAPE 6650EZ: Brand: IOBAN™ 2

## (undated) DEVICE — DRAPE,REIN 53X77,STERILE: Brand: MEDLINE